# Patient Record
Sex: MALE | Race: WHITE | NOT HISPANIC OR LATINO | Employment: FULL TIME | URBAN - NONMETROPOLITAN AREA
[De-identification: names, ages, dates, MRNs, and addresses within clinical notes are randomized per-mention and may not be internally consistent; named-entity substitution may affect disease eponyms.]

---

## 2018-03-14 ENCOUNTER — APPOINTMENT (INPATIENT)
Dept: RADIOLOGY | Facility: HOSPITAL | Age: 58
DRG: 639 | End: 2018-03-14
Payer: COMMERCIAL

## 2018-03-14 ENCOUNTER — HOSPITAL ENCOUNTER (INPATIENT)
Facility: HOSPITAL | Age: 58
LOS: 1 days | Discharge: HOME | DRG: 639 | End: 2018-03-15
Attending: EMERGENCY MEDICINE | Admitting: INTERNAL MEDICINE
Payer: COMMERCIAL

## 2018-03-14 DIAGNOSIS — E11.10 DIABETIC KETOACIDOSIS WITHOUT COMA ASSOCIATED WITH TYPE 2 DIABETES MELLITUS (HCC): ICD-10-CM

## 2018-03-14 DIAGNOSIS — E11.65 TYPE 2 DIABETES MELLITUS WITH HYPERGLYCEMIA, WITHOUT LONG-TERM CURRENT USE OF INSULIN (HCC): ICD-10-CM

## 2018-03-14 DIAGNOSIS — R63.4 WEIGHT LOSS: ICD-10-CM

## 2018-03-14 DIAGNOSIS — E11.10 DKA (DIABETIC KETOACIDOSES): Primary | ICD-10-CM

## 2018-03-14 PROBLEM — E87.1 HYPONATREMIA: Status: ACTIVE | Noted: 2018-03-14

## 2018-03-14 LAB
ACETONE SERPL-MCNC: ABNORMAL MG/DL
ALBUMIN SERPL BCP-MCNC: 3.7 G/DL (ref 3.5–5)
ALP SERPL-CCNC: 79 U/L (ref 46–116)
ALT SERPL W P-5'-P-CCNC: 27 U/L (ref 12–78)
ANION GAP SERPL CALCULATED.3IONS-SCNC: 15 MMOL/L (ref 4–13)
ANION GAP SERPL CALCULATED.3IONS-SCNC: 18 MMOL/L (ref 4–13)
AST SERPL W P-5'-P-CCNC: 12 U/L (ref 5–45)
BACTERIA UR QL AUTO: NORMAL /HPF
BASOPHILS # BLD AUTO: 0.06 THOUSANDS/ΜL (ref 0–0.1)
BASOPHILS NFR BLD AUTO: 1 % (ref 0–1)
BILIRUB SERPL-MCNC: 0.5 MG/DL (ref 0.2–1)
BILIRUB UR QL STRIP: NEGATIVE
BUN SERPL-MCNC: 28 MG/DL (ref 5–25)
BUN SERPL-MCNC: 33 MG/DL (ref 5–25)
CALCIUM SERPL-MCNC: 8.6 MG/DL (ref 8.3–10.1)
CALCIUM SERPL-MCNC: 9.4 MG/DL (ref 8.3–10.1)
CHLORIDE SERPL-SCNC: 101 MMOL/L (ref 100–108)
CHLORIDE SERPL-SCNC: 97 MMOL/L (ref 100–108)
CLARITY UR: CLEAR
CO2 SERPL-SCNC: 18 MMOL/L (ref 21–32)
CO2 SERPL-SCNC: 23 MMOL/L (ref 21–32)
COLOR UR: YELLOW
CREAT SERPL-MCNC: 0.86 MG/DL (ref 0.6–1.3)
CREAT SERPL-MCNC: 1.12 MG/DL (ref 0.6–1.3)
EOSINOPHIL # BLD AUTO: 0.2 THOUSAND/ΜL (ref 0–0.61)
EOSINOPHIL NFR BLD AUTO: 2 % (ref 0–6)
ERYTHROCYTE [DISTWIDTH] IN BLOOD BY AUTOMATED COUNT: 13.5 % (ref 11.6–15.1)
GFR SERPL CREATININE-BSD FRML MDRD: 73 ML/MIN/1.73SQ M
GFR SERPL CREATININE-BSD FRML MDRD: 97 ML/MIN/1.73SQ M
GLUCOSE SERPL-MCNC: 133 MG/DL (ref 65–140)
GLUCOSE SERPL-MCNC: 177 MG/DL (ref 65–140)
GLUCOSE SERPL-MCNC: 186 MG/DL (ref 65–140)
GLUCOSE SERPL-MCNC: 192 MG/DL (ref 65–140)
GLUCOSE SERPL-MCNC: 321 MG/DL (ref 65–140)
GLUCOSE SERPL-MCNC: 327 MG/DL (ref 65–140)
GLUCOSE UR STRIP-MCNC: ABNORMAL MG/DL
HCT VFR BLD AUTO: 44.8 % (ref 36.5–49.3)
HGB BLD-MCNC: 16.1 G/DL (ref 12–17)
HGB UR QL STRIP.AUTO: ABNORMAL
KETONES UR STRIP-MCNC: ABNORMAL MG/DL
LACTATE SERPL-SCNC: 0.6 MMOL/L (ref 0.5–2)
LEUKOCYTE ESTERASE UR QL STRIP: ABNORMAL
LYMPHOCYTES # BLD AUTO: 2.55 THOUSANDS/ΜL (ref 0.6–4.47)
LYMPHOCYTES NFR BLD AUTO: 30 % (ref 14–44)
MAGNESIUM SERPL-MCNC: 2.3 MG/DL (ref 1.6–2.6)
MCH RBC QN AUTO: 28.7 PG (ref 26.8–34.3)
MCHC RBC AUTO-ENTMCNC: 35.9 G/DL (ref 31.4–37.4)
MCV RBC AUTO: 80 FL (ref 82–98)
MONOCYTES # BLD AUTO: 0.76 THOUSAND/ΜL (ref 0.17–1.22)
MONOCYTES NFR BLD AUTO: 9 % (ref 4–12)
NEUTROPHILS # BLD AUTO: 5 THOUSANDS/ΜL (ref 1.85–7.62)
NEUTS SEG NFR BLD AUTO: 58 % (ref 43–75)
NITRITE UR QL STRIP: NEGATIVE
NON-SQ EPI CELLS URNS QL MICRO: NORMAL /HPF
PH BLDV: 7.27 [PH] (ref 7.3–7.4)
PH UR STRIP.AUTO: 5.5 [PH] (ref 4.5–8)
PLATELET # BLD AUTO: 199 THOUSANDS/UL (ref 149–390)
PMV BLD AUTO: 11.1 FL (ref 8.9–12.7)
POTASSIUM SERPL-SCNC: 3.4 MMOL/L (ref 3.5–5.3)
POTASSIUM SERPL-SCNC: 4.2 MMOL/L (ref 3.5–5.3)
PROT SERPL-MCNC: 7.6 G/DL (ref 6.4–8.2)
PROT UR STRIP-MCNC: NEGATIVE MG/DL
RBC # BLD AUTO: 5.61 MILLION/UL (ref 3.88–5.62)
RBC #/AREA URNS AUTO: NORMAL /HPF
SODIUM SERPL-SCNC: 135 MMOL/L (ref 136–145)
SODIUM SERPL-SCNC: 137 MMOL/L (ref 136–145)
SP GR UR STRIP.AUTO: 1.01 (ref 1–1.03)
TROPONIN I SERPL-MCNC: <0.02 NG/ML
TROPONIN I SERPL-MCNC: <0.02 NG/ML
UROBILINOGEN UR QL STRIP.AUTO: 0.2 E.U./DL
WBC # BLD AUTO: 8.57 THOUSAND/UL (ref 4.31–10.16)
WBC #/AREA URNS AUTO: NORMAL /HPF

## 2018-03-14 PROCEDURE — 83605 ASSAY OF LACTIC ACID: CPT | Performed by: INTERNAL MEDICINE

## 2018-03-14 PROCEDURE — 71045 X-RAY EXAM CHEST 1 VIEW: CPT

## 2018-03-14 PROCEDURE — 99285 EMERGENCY DEPT VISIT HI MDM: CPT

## 2018-03-14 PROCEDURE — 82009 KETONE BODYS QUAL: CPT | Performed by: EMERGENCY MEDICINE

## 2018-03-14 PROCEDURE — 82800 BLOOD PH: CPT | Performed by: EMERGENCY MEDICINE

## 2018-03-14 PROCEDURE — 80053 COMPREHEN METABOLIC PANEL: CPT | Performed by: EMERGENCY MEDICINE

## 2018-03-14 PROCEDURE — 85025 COMPLETE CBC W/AUTO DIFF WBC: CPT | Performed by: EMERGENCY MEDICINE

## 2018-03-14 PROCEDURE — 96360 HYDRATION IV INFUSION INIT: CPT

## 2018-03-14 PROCEDURE — 93005 ELECTROCARDIOGRAM TRACING: CPT | Performed by: EMERGENCY MEDICINE

## 2018-03-14 PROCEDURE — 84484 ASSAY OF TROPONIN QUANT: CPT | Performed by: INTERNAL MEDICINE

## 2018-03-14 PROCEDURE — 82948 REAGENT STRIP/BLOOD GLUCOSE: CPT

## 2018-03-14 PROCEDURE — 83036 HEMOGLOBIN GLYCOSYLATED A1C: CPT | Performed by: INTERNAL MEDICINE

## 2018-03-14 PROCEDURE — 84156 ASSAY OF PROTEIN URINE: CPT | Performed by: INTERNAL MEDICINE

## 2018-03-14 PROCEDURE — 84484 ASSAY OF TROPONIN QUANT: CPT | Performed by: EMERGENCY MEDICINE

## 2018-03-14 PROCEDURE — 36415 COLL VENOUS BLD VENIPUNCTURE: CPT | Performed by: EMERGENCY MEDICINE

## 2018-03-14 PROCEDURE — 80048 BASIC METABOLIC PNL TOTAL CA: CPT | Performed by: INTERNAL MEDICINE

## 2018-03-14 PROCEDURE — 99222 1ST HOSP IP/OBS MODERATE 55: CPT | Performed by: INTERNAL MEDICINE

## 2018-03-14 PROCEDURE — 82043 UR ALBUMIN QUANTITATIVE: CPT | Performed by: INTERNAL MEDICINE

## 2018-03-14 PROCEDURE — 81001 URINALYSIS AUTO W/SCOPE: CPT | Performed by: EMERGENCY MEDICINE

## 2018-03-14 PROCEDURE — 83735 ASSAY OF MAGNESIUM: CPT | Performed by: EMERGENCY MEDICINE

## 2018-03-14 PROCEDURE — 87086 URINE CULTURE/COLONY COUNT: CPT | Performed by: INTERNAL MEDICINE

## 2018-03-14 PROCEDURE — 82570 ASSAY OF URINE CREATININE: CPT | Performed by: INTERNAL MEDICINE

## 2018-03-14 RX ORDER — ACETAMINOPHEN 325 MG/1
650 TABLET ORAL EVERY 6 HOURS PRN
Status: DISCONTINUED | OUTPATIENT
Start: 2018-03-14 | End: 2018-03-15 | Stop reason: HOSPADM

## 2018-03-14 RX ORDER — SODIUM CHLORIDE 9 MG/ML
150 INJECTION, SOLUTION INTRAVENOUS CONTINUOUS
Status: DISCONTINUED | OUTPATIENT
Start: 2018-03-14 | End: 2018-03-15

## 2018-03-14 RX ORDER — SODIUM CHLORIDE 9 MG/ML
1000 INJECTION, SOLUTION INTRAVENOUS CONTINUOUS
Status: DISCONTINUED | OUTPATIENT
Start: 2018-03-14 | End: 2018-03-14

## 2018-03-14 RX ORDER — SODIUM CHLORIDE 9 MG/ML
500 INJECTION, SOLUTION INTRAVENOUS CONTINUOUS
Status: DISCONTINUED | OUTPATIENT
Start: 2018-03-14 | End: 2018-03-14

## 2018-03-14 RX ORDER — SODIUM CHLORIDE 9 MG/ML
125 INJECTION, SOLUTION INTRAVENOUS CONTINUOUS
Status: DISCONTINUED | OUTPATIENT
Start: 2018-03-15 | End: 2018-03-14

## 2018-03-14 RX ORDER — ONDANSETRON 2 MG/ML
4 INJECTION INTRAMUSCULAR; INTRAVENOUS EVERY 6 HOURS PRN
Status: DISCONTINUED | OUTPATIENT
Start: 2018-03-14 | End: 2018-03-15 | Stop reason: HOSPADM

## 2018-03-14 RX ORDER — POTASSIUM CHLORIDE 20 MEQ/1
40 TABLET, EXTENDED RELEASE ORAL ONCE
Status: COMPLETED | OUTPATIENT
Start: 2018-03-14 | End: 2018-03-14

## 2018-03-14 RX ORDER — DEXTROSE AND SODIUM CHLORIDE 5; .9 G/100ML; G/100ML
150 INJECTION, SOLUTION INTRAVENOUS CONTINUOUS
Status: DISCONTINUED | OUTPATIENT
Start: 2018-03-14 | End: 2018-03-15

## 2018-03-14 RX ADMIN — DEXTROSE AND SODIUM CHLORIDE 150 ML/HR: 5; .9 INJECTION, SOLUTION INTRAVENOUS at 21:50

## 2018-03-14 RX ADMIN — SODIUM CHLORIDE 1000 ML: 0.9 INJECTION, SOLUTION INTRAVENOUS at 21:14

## 2018-03-14 RX ADMIN — SODIUM CHLORIDE 1000 ML: 0.9 INJECTION, SOLUTION INTRAVENOUS at 23:10

## 2018-03-14 RX ADMIN — SODIUM CHLORIDE 1000 ML: 0.9 INJECTION, SOLUTION INTRAVENOUS at 17:51

## 2018-03-14 RX ADMIN — POTASSIUM CHLORIDE 40 MEQ: 1500 TABLET, EXTENDED RELEASE ORAL at 23:08

## 2018-03-14 RX ADMIN — Medication 2 UNITS/HR: at 21:39

## 2018-03-14 RX ADMIN — ENOXAPARIN SODIUM 40 MG: 40 INJECTION SUBCUTANEOUS at 22:56

## 2018-03-14 NOTE — ED PROVIDER NOTES
History  Chief Complaint   Patient presents with    Hyperglycemia - no symptoms     blood sugars running in the 400's for 4 days  feet cramping, dry mouth, tongue cracking     Patient: Manjeet Suarez  57 y o /male  YOB: 1960  MRN: [id number]  PCP: Maria Alejandra Howard DO  Date of evaluation: 3/14/2018    (N B  [address] may have been used in the preparation of this document )    History provided by:  Patient and spouse  Hyperglycemia - no symptoms   Timing:  Constant  Progression:  Worsening  Current diabetic treatments: diabetic - meds only sporadically because lost job, insurance  Relieved by:  Nothing  Ineffective treatments: increased po fluids  Associated symptoms: blurred vision, dehydration, increased thirst, polyuria and weight change (est 48 pounds in several months)    Associated symptoms: no abdominal pain, no altered mental status, no chest pain, no confusion, no diaphoresis, no dysuria, no fever, no increased appetite, no shortness of breath, no vomiting and no weakness  Nausea: from drinking so much at a time  Prior to Admission Medications   Prescriptions Last Dose Informant Patient Reported? Taking? Empagliflozin (JARDIANCE) 25 MG TABS   Yes Yes   Sig: Take 25 mg by mouth every morning   metFORMIN (GLUCOPHAGE) 500 mg tablet   Yes Yes   Sig: Take 500 mg by mouth daily after dinner      Facility-Administered Medications: None       Past Medical History:   Diagnosis Date    [facility]        Past Surgical History:   Procedure Laterality Date    HERNIA REPAIR         History reviewed  No pertinent family history  I have reviewed and agree with the history as documented  Social History   Substance Use Topics    Smoking status: Never Smoker    Smokeless tobacco: Current User    Alcohol use Yes      Comment: ocassional        Review of Systems   Constitutional: Negative for chills, diaphoresis and fever  HENT: Negative    Negative for trouble swallowing and voice change  Mouth dry   Eyes: Positive for blurred vision  Negative for photophobia  Visual disturbance: blurry vision  Respiratory: Negative  Negative for cough and shortness of breath  Cardiovascular: Negative  Negative for chest pain and palpitations  Gastrointestinal: Negative  Negative for abdominal pain and vomiting  Nausea: from drinking so much at a time  Endocrine: Positive for polydipsia and polyuria  Genitourinary: Negative  Negative for difficulty urinating, dysuria and urgency  Musculoskeletal: Negative  Negative for back pain and neck pain  Skin: Negative  Negative for rash and wound  Allergic/Immunologic: Negative for immunocompromised state  Neurological: Negative  Negative for speech difficulty and weakness  Hematological: Negative  Negative for adenopathy  Does not bruise/bleed easily  Psychiatric/Behavioral: Negative for confusion  All other systems reviewed and are negative  Physical Exam  ED Triage Vitals [03/14/18 1621]   Temperature Pulse Respirations Blood Pressure SpO2   (!) 96 8 °F (36 °C) 90 20 143/84 98 %      Temp Source Heart Rate Source Patient Position - Orthostatic VS BP Location FiO2 (%)   Temporal Right Sitting Right arm --      Pain Score       No Pain           Orthostatic Vital Signs  Vitals:    03/14/18 1621   BP: 143/84   Pulse: 90   Patient Position - Orthostatic VS: Sitting       Physical Exam   Constitutional: He is oriented to person, place, and time  He appears well-developed and well-nourished  HENT:   Mouth/Throat: Oropharynx is clear and moist  Mucous membranes are not pale and dry  Voice normal   Eyes: EOM are normal  Pupils are equal, round, and reactive to light  Cardiovascular: Normal rate and regular rhythm  Pulmonary/Chest: Effort normal    Abdominal: Soft  Bowel sounds are normal    Neurological: He is alert and oriented to person, place, and time  GCS eye subscore is 4   GCS verbal subscore is 5  GCS motor subscore is 6  Skin: Skin is warm and dry  Psychiatric: He has a normal mood and affect  His speech is normal and behavior is normal    Nursing note and vitals reviewed  ED Medications  Medications   sodium chloride 0 9 % bolus 1,000 mL (not administered)       Diagnostic Studies  Results Reviewed     Procedure Component Value Units Date/Time    pH, venous [[id number]] Collected:  03/14/18 1732    Lab Status: In process Specimen:  Blood from Arm, Left Updated:  03/14/18 1739    CBC and differential [[id number]]  (Abnormal) Collected:  03/14/18 1732    Lab Status:  Final result Specimen:  Blood from Arm, Left Updated:  03/14/18 1738     WBC 8 57 Thousand/uL      RBC 5 61 Million/uL      Hemoglobin 16 1 g/dL      Hematocrit 44 8 %      MCV 80 (L) fL      MCH 28 7 pg      MCHC 35 9 g/dL      RDW 13 5 %      MPV 11 1 fL      Platelets 465 Thousands/uL      Neutrophils Relative 58 %      Lymphocytes Relative 30 %      Monocytes Relative 9 %      Eosinophils Relative 2 %      Basophils Relative 1 %      Neutrophils Absolute 5 00 Thousands/µL      Lymphocytes Absolute 2 55 Thousands/µL      Monocytes Absolute 0 76 Thousand/µL      Eosinophils Absolute 0 20 Thousand/µL      Basophils Absolute 0 06 Thousands/µL     Acetone [[id number]]     Lab Status:  No result Specimen:  Blood     Magnesium [[id number]]     Lab Status:  No result Specimen:  Blood     Troponin I [[id number]]     Lab Status:  No result Specimen:  Blood     UA w Reflex to Microscopic [[id number]]     Lab Status:  No result Specimen:  Urine     Comprehensive metabolic panel [[id number]] Collected:  03/14/18 1732    Lab Status: In process Specimen:  Blood from Arm, Left Updated:  03/14/18 1736    [place] draw [[id number]] Collected:  03/14/18 1732    Lab Status: In process Specimen:  Blood Updated:  03/14/18 1736    Narrative: The following orders were created for panel order [place] draw    Procedure Abnormality         Status                     ---------                               -----------         ------                     Saray Joe Top on FGQU[[id number]]                            In process                 Gold top on MRFH[[id number]]                                  In process                 Green / Black tube on HHIL[[id number]]                        In process                 Lavender Top 7ml on YDUI[[id number]]                          In process                   Please view results for these tests on the individual orders  Fingerstick Glucose (POCT) [[id number]]  (Abnormal) Collected:  03/14/18 1627    Lab Status:  Final result Updated:  03/14/18 1629     POC Glucose 321 (H) mg/dl                  No orders to display              Procedures  Procedures       Phone Contacts  ED Phone Contact    ED Course  ED Course as of Mar 21 2109   Wed Mar 14, 2018   1933 D/W Dr Shirley Thompson - he is ordering the insulin drip  1933 pH, Keagan: (!) 7 271   1934 Ketones, UA: (!) 40 (2+)   1934 Acetone, Bld: (!) 1+   1934 Anion Gap: (!) 15                               MDM  CritCare Time    Disposition  Final diagnoses:   None     ED Disposition     None      Follow-up Information    None       Patient's Medications   Discharge Prescriptions    No medications on file     No discharge procedures on file      ED Provider  Electronically Signed by           Ricki Barboza MD  03/21/18 2109

## 2018-03-15 VITALS
HEART RATE: 66 BPM | SYSTOLIC BLOOD PRESSURE: 110 MMHG | RESPIRATION RATE: 19 BRPM | TEMPERATURE: 97.4 F | BODY MASS INDEX: 31.03 KG/M2 | WEIGHT: 249.56 LBS | DIASTOLIC BLOOD PRESSURE: 72 MMHG | HEIGHT: 75 IN | OXYGEN SATURATION: 97 %

## 2018-03-15 PROBLEM — E11.10 DIABETIC KETOACIDOSIS (HCC): Status: RESOLVED | Noted: 2018-03-14 | Resolved: 2018-03-15

## 2018-03-15 PROBLEM — E87.1 HYPONATREMIA: Status: RESOLVED | Noted: 2018-03-14 | Resolved: 2018-03-15

## 2018-03-15 LAB
25(OH)D3 SERPL-MCNC: 17.9 NG/ML (ref 30–100)
ALBUMIN SERPL BCP-MCNC: 2.8 G/DL (ref 3.5–5)
ALBUMIN SERPL BCP-MCNC: 2.9 G/DL (ref 3.5–5)
ALP SERPL-CCNC: 53 U/L (ref 46–116)
ALP SERPL-CCNC: 53 U/L (ref 46–116)
ALT SERPL W P-5'-P-CCNC: 18 U/L (ref 12–78)
ALT SERPL W P-5'-P-CCNC: 21 U/L (ref 12–78)
ANION GAP SERPL CALCULATED.3IONS-SCNC: 11 MMOL/L (ref 4–13)
ANION GAP SERPL CALCULATED.3IONS-SCNC: 11 MMOL/L (ref 4–13)
ANION GAP SERPL CALCULATED.3IONS-SCNC: 15 MMOL/L (ref 4–13)
ANION GAP SERPL CALCULATED.3IONS-SCNC: 16 MMOL/L (ref 4–13)
AST SERPL W P-5'-P-CCNC: 11 U/L (ref 5–45)
AST SERPL W P-5'-P-CCNC: 9 U/L (ref 5–45)
ATRIAL RATE: 89 BPM
BASOPHILS # BLD AUTO: 0.05 THOUSANDS/ΜL (ref 0–0.1)
BASOPHILS NFR BLD AUTO: 1 % (ref 0–1)
BILIRUB DIRECT SERPL-MCNC: 0.16 MG/DL (ref 0–0.2)
BILIRUB SERPL-MCNC: 0.5 MG/DL (ref 0.2–1)
BILIRUB SERPL-MCNC: 0.5 MG/DL (ref 0.2–1)
BUN SERPL-MCNC: 17 MG/DL (ref 5–25)
BUN SERPL-MCNC: 17 MG/DL (ref 5–25)
BUN SERPL-MCNC: 19 MG/DL (ref 5–25)
BUN SERPL-MCNC: 24 MG/DL (ref 5–25)
CA-I BLD-SCNC: 1.15 MMOL/L (ref 1.12–1.32)
CALCIUM SERPL-MCNC: 7.9 MG/DL (ref 8.3–10.1)
CALCIUM SERPL-MCNC: 8 MG/DL (ref 8.3–10.1)
CALCIUM SERPL-MCNC: 8 MG/DL (ref 8.3–10.1)
CALCIUM SERPL-MCNC: 8.1 MG/DL (ref 8.3–10.1)
CHLORIDE SERPL-SCNC: 106 MMOL/L (ref 100–108)
CK SERPL-CCNC: 33 U/L (ref 39–308)
CO2 SERPL-SCNC: 19 MMOL/L (ref 21–32)
CO2 SERPL-SCNC: 20 MMOL/L (ref 21–32)
CO2 SERPL-SCNC: 22 MMOL/L (ref 21–32)
CO2 SERPL-SCNC: 22 MMOL/L (ref 21–32)
CREAT SERPL-MCNC: 0.75 MG/DL (ref 0.6–1.3)
CREAT SERPL-MCNC: 0.78 MG/DL (ref 0.6–1.3)
CREAT SERPL-MCNC: 0.78 MG/DL (ref 0.6–1.3)
CREAT SERPL-MCNC: 0.82 MG/DL (ref 0.6–1.3)
CREAT UR-MCNC: 51 MG/DL
CREAT UR-MCNC: 51 MG/DL
EOSINOPHIL # BLD AUTO: 0.17 THOUSAND/ΜL (ref 0–0.61)
EOSINOPHIL NFR BLD AUTO: 3 % (ref 0–6)
ERYTHROCYTE [DISTWIDTH] IN BLOOD BY AUTOMATED COUNT: 13.5 % (ref 11.6–15.1)
EST. AVERAGE GLUCOSE BLD GHB EST-MCNC: 312 MG/DL
GFR SERPL CREATININE-BSD FRML MDRD: 101 ML/MIN/1.73SQ M
GFR SERPL CREATININE-BSD FRML MDRD: 101 ML/MIN/1.73SQ M
GFR SERPL CREATININE-BSD FRML MDRD: 103 ML/MIN/1.73SQ M
GFR SERPL CREATININE-BSD FRML MDRD: 99 ML/MIN/1.73SQ M
GLUCOSE SERPL-MCNC: 112 MG/DL (ref 65–140)
GLUCOSE SERPL-MCNC: 115 MG/DL (ref 65–140)
GLUCOSE SERPL-MCNC: 115 MG/DL (ref 65–140)
GLUCOSE SERPL-MCNC: 123 MG/DL (ref 65–140)
GLUCOSE SERPL-MCNC: 125 MG/DL (ref 65–140)
GLUCOSE SERPL-MCNC: 127 MG/DL (ref 65–140)
GLUCOSE SERPL-MCNC: 138 MG/DL (ref 65–140)
GLUCOSE SERPL-MCNC: 139 MG/DL (ref 65–140)
GLUCOSE SERPL-MCNC: 146 MG/DL (ref 65–140)
GLUCOSE SERPL-MCNC: 148 MG/DL (ref 65–140)
GLUCOSE SERPL-MCNC: 149 MG/DL (ref 65–140)
GLUCOSE SERPL-MCNC: 159 MG/DL (ref 65–140)
GLUCOSE SERPL-MCNC: 162 MG/DL (ref 65–140)
GLUCOSE SERPL-MCNC: 165 MG/DL (ref 65–140)
GLUCOSE SERPL-MCNC: 171 MG/DL (ref 65–140)
HBA1C MFR BLD: 12.5 % (ref 4.2–6.3)
HCT VFR BLD AUTO: 37.5 % (ref 36.5–49.3)
HGB BLD-MCNC: 13.4 G/DL (ref 12–17)
LACTATE SERPL-SCNC: 0.8 MMOL/L (ref 0.5–2)
LYMPHOCYTES # BLD AUTO: 2.28 THOUSANDS/ΜL (ref 0.6–4.47)
LYMPHOCYTES NFR BLD AUTO: 42 % (ref 14–44)
MAGNESIUM SERPL-MCNC: 2 MG/DL (ref 1.6–2.6)
MAGNESIUM SERPL-MCNC: 2 MG/DL (ref 1.6–2.6)
MAGNESIUM SERPL-MCNC: 2.1 MG/DL (ref 1.6–2.6)
MCH RBC QN AUTO: 29.1 PG (ref 26.8–34.3)
MCHC RBC AUTO-ENTMCNC: 35.7 G/DL (ref 31.4–37.4)
MCV RBC AUTO: 81 FL (ref 82–98)
MICROALBUMIN UR-MCNC: 16.7 MG/L (ref 0–20)
MICROALBUMIN/CREAT 24H UR: 33 MG/G CREATININE (ref 0–30)
MONOCYTES # BLD AUTO: 0.44 THOUSAND/ΜL (ref 0.17–1.22)
MONOCYTES NFR BLD AUTO: 8 % (ref 4–12)
NEUTROPHILS # BLD AUTO: 2.55 THOUSANDS/ΜL (ref 1.85–7.62)
NEUTS SEG NFR BLD AUTO: 46 % (ref 43–75)
P AXIS: 68 DEGREES
PHOSPHATE SERPL-MCNC: 3.6 MG/DL (ref 2.7–4.5)
PLATELET # BLD AUTO: 162 THOUSANDS/UL (ref 149–390)
PMV BLD AUTO: 11.2 FL (ref 8.9–12.7)
POTASSIUM SERPL-SCNC: 3.4 MMOL/L (ref 3.5–5.3)
POTASSIUM SERPL-SCNC: 3.9 MMOL/L (ref 3.5–5.3)
POTASSIUM SERPL-SCNC: 4 MMOL/L (ref 3.5–5.3)
POTASSIUM SERPL-SCNC: 4 MMOL/L (ref 3.5–5.3)
PR INTERVAL: 130 MS
PROT SERPL-MCNC: 6 G/DL (ref 6.4–8.2)
PROT SERPL-MCNC: 6.1 G/DL (ref 6.4–8.2)
PROT UR-MCNC: 19 MG/DL
PROT/CREAT UR: 0.37 MG/G{CREAT} (ref 0–0.1)
QRS AXIS: -39 DEGREES
QRSD INTERVAL: 84 MS
QT INTERVAL: 378 MS
QTC INTERVAL: 459 MS
RBC # BLD AUTO: 4.61 MILLION/UL (ref 3.88–5.62)
SODIUM SERPL-SCNC: 139 MMOL/L (ref 136–145)
SODIUM SERPL-SCNC: 139 MMOL/L (ref 136–145)
SODIUM SERPL-SCNC: 140 MMOL/L (ref 136–145)
SODIUM SERPL-SCNC: 142 MMOL/L (ref 136–145)
T WAVE AXIS: 33 DEGREES
TROPONIN I SERPL-MCNC: <0.02 NG/ML
TSH SERPL DL<=0.05 MIU/L-ACNC: 3.54 UIU/ML (ref 0.36–3.74)
VENTRICULAR RATE: 89 BPM
WBC # BLD AUTO: 5.49 THOUSAND/UL (ref 4.31–10.16)

## 2018-03-15 PROCEDURE — G8979 MOBILITY GOAL STATUS: HCPCS | Performed by: PHYSICAL THERAPIST

## 2018-03-15 PROCEDURE — 80053 COMPREHEN METABOLIC PANEL: CPT | Performed by: INTERNAL MEDICINE

## 2018-03-15 PROCEDURE — G8978 MOBILITY CURRENT STATUS: HCPCS | Performed by: PHYSICAL THERAPIST

## 2018-03-15 PROCEDURE — 84484 ASSAY OF TROPONIN QUANT: CPT | Performed by: INTERNAL MEDICINE

## 2018-03-15 PROCEDURE — 83735 ASSAY OF MAGNESIUM: CPT | Performed by: INTERNAL MEDICINE

## 2018-03-15 PROCEDURE — 84443 ASSAY THYROID STIM HORMONE: CPT | Performed by: INTERNAL MEDICINE

## 2018-03-15 PROCEDURE — 93010 ELECTROCARDIOGRAM REPORT: CPT | Performed by: INTERNAL MEDICINE

## 2018-03-15 PROCEDURE — 90686 IIV4 VACC NO PRSV 0.5 ML IM: CPT | Performed by: INTERNAL MEDICINE

## 2018-03-15 PROCEDURE — 80048 BASIC METABOLIC PNL TOTAL CA: CPT | Performed by: INTERNAL MEDICINE

## 2018-03-15 PROCEDURE — 90732 PPSV23 VACC 2 YRS+ SUBQ/IM: CPT | Performed by: INTERNAL MEDICINE

## 2018-03-15 PROCEDURE — 82330 ASSAY OF CALCIUM: CPT | Performed by: INTERNAL MEDICINE

## 2018-03-15 PROCEDURE — G8988 SELF CARE GOAL STATUS: HCPCS

## 2018-03-15 PROCEDURE — G8980 MOBILITY D/C STATUS: HCPCS | Performed by: PHYSICAL THERAPIST

## 2018-03-15 PROCEDURE — 82948 REAGENT STRIP/BLOOD GLUCOSE: CPT

## 2018-03-15 PROCEDURE — 83605 ASSAY OF LACTIC ACID: CPT | Performed by: INTERNAL MEDICINE

## 2018-03-15 PROCEDURE — 84100 ASSAY OF PHOSPHORUS: CPT | Performed by: INTERNAL MEDICINE

## 2018-03-15 PROCEDURE — 97167 OT EVAL HIGH COMPLEX 60 MIN: CPT

## 2018-03-15 PROCEDURE — 82550 ASSAY OF CK (CPK): CPT | Performed by: INTERNAL MEDICINE

## 2018-03-15 PROCEDURE — 97162 PT EVAL MOD COMPLEX 30 MIN: CPT | Performed by: PHYSICAL THERAPIST

## 2018-03-15 PROCEDURE — G8989 SELF CARE D/C STATUS: HCPCS

## 2018-03-15 PROCEDURE — 99239 HOSP IP/OBS DSCHRG MGMT >30: CPT | Performed by: INTERNAL MEDICINE

## 2018-03-15 PROCEDURE — 85025 COMPLETE CBC W/AUTO DIFF WBC: CPT | Performed by: INTERNAL MEDICINE

## 2018-03-15 PROCEDURE — 82306 VITAMIN D 25 HYDROXY: CPT | Performed by: INTERNAL MEDICINE

## 2018-03-15 PROCEDURE — G8987 SELF CARE CURRENT STATUS: HCPCS

## 2018-03-15 PROCEDURE — 80076 HEPATIC FUNCTION PANEL: CPT | Performed by: INTERNAL MEDICINE

## 2018-03-15 RX ORDER — INSULIN GLARGINE 100 [IU]/ML
24 INJECTION, SOLUTION SUBCUTANEOUS
Qty: 10 ML | Refills: 0 | Status: SHIPPED | OUTPATIENT
Start: 2018-03-15 | End: 2019-04-30

## 2018-03-15 RX ORDER — INSULIN GLARGINE 100 [IU]/ML
24 INJECTION, SOLUTION SUBCUTANEOUS
Status: DISCONTINUED | OUTPATIENT
Start: 2018-03-15 | End: 2018-03-15 | Stop reason: HOSPADM

## 2018-03-15 RX ORDER — POTASSIUM CHLORIDE 20 MEQ/1
40 TABLET, EXTENDED RELEASE ORAL ONCE
Status: COMPLETED | OUTPATIENT
Start: 2018-03-15 | End: 2018-03-15

## 2018-03-15 RX ADMIN — PNEUMOCOCCAL VACCINE POLYVALENT 0.5 ML
25; 25; 25; 25; 25; 25; 25; 25; 25; 25; 25; 25; 25; 25; 25; 25; 25; 25; 25; 25; 25; 25; 25 INJECTION, SOLUTION INTRAMUSCULAR; SUBCUTANEOUS at 12:24

## 2018-03-15 RX ADMIN — POTASSIUM CHLORIDE 40 MEQ: 1500 TABLET, EXTENDED RELEASE ORAL at 03:08

## 2018-03-15 RX ADMIN — INFLUENZA VIRUS VACCINE 0.5 ML: 15; 15; 15; 15 SUSPENSION INTRAMUSCULAR at 12:25

## 2018-03-15 RX ADMIN — INSULIN LISPRO 5 UNITS: 100 INJECTION, SOLUTION INTRAVENOUS; SUBCUTANEOUS at 12:23

## 2018-03-15 RX ADMIN — INSULIN HUMAN 12 UNITS: 100 INJECTION, SUSPENSION SUBCUTANEOUS at 10:34

## 2018-03-15 RX ADMIN — DEXTROSE AND SODIUM CHLORIDE 150 ML/HR: 5; .9 INJECTION, SOLUTION INTRAVENOUS at 04:08

## 2018-03-15 NOTE — SOCIAL WORK
Received call from MD he had Rx faxed to [facility] in [facility] and he wanted me to run the cost of them  As per pharmacist the lantus in vial form will cost the pt $50  He could get generic form of Lantus in a pen for a little cheaper approx $40   Did notify pt's nurse of same  Pt is currently talking to dietician I will let him know after she is done

## 2018-03-15 NOTE — OCCUPATIONAL THERAPY NOTE
Occupational Therapy Evaluation     Patient Name: Lydia Hardin  NLZKK'D Date: 3/15/2018  Problem List  Patient Active Problem List   Diagnosis    Di[facility]    Type 2 diabetes mellitus with hyperglycemia ([facility] )    Hyponatremia    Weight loss     Past Medical History  Past Medical History:   Diagnosis Date    Diabetes mellitus ([facility] )      Past Surgical History  Past Surgical History:   Procedure Laterality Date    HERNIA REPAIR             03/15/18 0746   Note Type   Note type Eval only   Restrictions/Precautions   Weight Bearing Precautions Per Order No   Other Precautions Multiple lines;Telemetry; Fall Risk   Pain Assessment   Pain Assessment No/denies pain   Home Living   Type of [address] [place] [address] One level;Performs ADLs on one level; Able to live on main level with bedroom/bathroom;Stairs to enter with rails  (3 MELINDA c HR)   Bathroom Shower/Tub Tub/shower unit   Bathroom Toilet Standard   Bathroom Accessibility Accessible   Additional Comments pt with no DME   Prior Function   Level of [place] Independent with ADLs and functional mobility   Lives With Spouse   ADL Assistance Independent   IADLs Independent   Falls in the last 6 months 0   Vocational Full time employment   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    Psychosocial   Psychosocial (WDL) WDL   Bed Mobility   Additional Comments pt OOB in chair at start and end of session   Transfers   Sit to Stand 7  Independent   Stand to Sit 7  Independent   Functional Mobility   Functional Mobility 7  Independent   Additional Comments pt performed 200ft of FM with no difficulties; no SOB and no LOB   Balance   Static Sitting Good   Dynamic Sitting Good   Static Standing Good   Dynamic Standing Good   Ambulatory Good   Activity Tolerance   Activity Tolerance Patient tolerated treatment well   RUE Assessment   RUE Assessment WFL   LUE Assessment   LUE Assessment WFL   Hand Function   Gross Motor Coordination Functional   Fine Motor Coordination Functional   Sensation   Light Touch No apparent deficits   Sharp/Dull No apparent deficits   Cognition   Overall Cognitive Status WFL   Arousal/Participation Alert   Attention Within functional limits   Orientation Level Oriented X4   Memory Within functional limits   Following Commands Follows all commands and directions without difficulty   Assessment   Assessment pt with no functional limitations requiring skilled OT intervention   Recommendation   OT Discharge Recommendation Home independent   OT - OK to Discharge Yes   Barthel Index   Feeding 10   Bathing 5   Grooming Score 5   Dressing Score 10   Bladder Score 10   Bowels Score 10   Toilet Use Score 10   Transfers (Bed/Chair) Score 15   Mobility (Level Surface) Score 15   Stairs Score 0   Barthel Index Score 90     Pt left seated in chair at end of session; all needs within reach; all lines intact  Pt is performing at [facility]; OT services will be discontinued at this time

## 2018-03-15 NOTE — PLAN OF CARE
Problem: PHYSICAL THERAPY ADULT  Goal: Performs mobility at highest level of function for planned discharge setting  See evaluation for individualized goals  Outcome: Completed Date Met: 03/15/18  Prognosis: Good     Assessment: Pt is a 65year old male presenting with good strength balance endurance and functional mobility performing all bed mobility transfers and ambulation at an (I) level no A D  Pt safe to ambulate in room and hallway ad jennie  No skilled PT indicated  Recommendation: Home independently     PT - OK to Discharge: Yes    See flowsheet documentation for full assessment

## 2018-03-15 NOTE — CASE MANAGEMENT
Initial Clinical Review  Admission: Date/Time/Statement: 3/14/18 @ 1935   Orders Placed This Encounter   Procedures    Inpatient Admission (expected length of stay for this patient is greater than two midnights)     Standing Status:   Standing     Number of Occurrences:   1     Order Specific Question:   Admitting Physician     Answer:   Marcelle Rodriguez     Order Specific Question:   Level of Care     Answer:   Level 1 Stepdown [13]     Order Specific Question:   Estimated length of stay     Answer:   More than 2 Midnights     Order Specific Question:   Certification     Answer:   I certify that inpatient services are medically necessary for this patient for a duration of greater than two midnights  See H&P and MD Progress Notes for additional information about the patient's course of treatment  ED: Date/Time/Mode of Arrival:   ED Arrival Information     Expected Arrival Acuity Means of Arrival Escorted By Service Admission Type    - 3/14/2018 16:03 Urgent Walk-In Family Member General Medicine Urgent    Arrival Complaint    high blood sugar      Chief Complaint:   Chief Complaint   Patient presents with    Hyperglycemia - no symptoms     blood sugars running in the 400's for 4 days  feet cramping, dry mouth, tongue cracking   History of Illness:    65year-old male who presented to the ED with the complaint of elevated blood glucose levels  The patient had previously lost his insurance coverage and was unable to obtain his oral diabetic medications secondary to cost   He has been off his oral diabetic medications for several months  He has been experiencing frequent hyperglycemia with blood glucose readings in the 400's and at times, the blood glucose level is critically high on the glucometer  The patient has been experiencing severe polydipsia and polyuria  In addition, he complains of a visual disturbance described as blurry vision  Nothing seemed to relieve his symptoms    In addition, the patient complains of a 40-50 pound weight loss over the last few months  No chest pain  No shortness of breath  He also has been experiencing bilateral foot pain over the last few weeks  He recently started a new job and now has insurance coverage  Additional associated symptoms include difficulty focusing and daytime fatigue  ED Vital Signs:   ED Triage Vitals [03/14/18 1621]   Temperature Pulse Respirations Blood Pressure SpO2   (!) 96 8 °F (36 °C) 90 20 143/84 98 %      Temp Source Heart Rate Source Patient Position - Orthostatic VS BP Location FiO2 (%)   Temporal Right Sitting Right arm --      Pain Score       No Pain        Wt Readings from Last 1 Encounters:   03/15/18 113 kg (249 lb 9 oz)   Vital Signs (abnormal): Abnormal Labs/Diagnostic Test Results:    CL 97 ANION GAP 15 BUN 33 GLUC 327 ACETONE 1+  pH VENOUS 7 271  U/A+LEUK, GLUC, KETONES 40 (2+), BLOOD  TROP NEG  Microalb Creat Ratio 0 - 30 mg/g creatinine 33   H     Prot/Creat Ratio, Ur 0 00 - 0 10 0 37   H   ED Treatment:   Medication Administration from 03/14/2018 1602 to 03/14/2018 2040       Date/Time Order Dose Route Action Action by Comments     03/14/2018 1851 sodium chloride 0 9 % bolus 1,000 mL 0 mL Intravenous Stopped Hussain Bacon RN      03/14/2018 1751 sodium chloride 0 9 % bolus 1,000 mL 1,000 mL Intravenous New Bag Hussain Bacon RN       Past Medical/Surgical History:    Active Ambulatory Problems     Diagnosis Date Noted    No Active Ambulatory Problems     Resolved Ambulatory Problems     Diagnosis Date Noted    No Resolved Ambulatory Problems     Past Medical History:   Diagnosis Date    Diabetes mellitus ([place] [address] )    Admitting Diagnosis: DKA (diabetic ketoacidoses) ([place] [address] ) [E13 10]  High blood sugar [R73 9]  Age/Sex: 65 y o  male  Assessment/Plan:   1)  Diabetic ketoacidosis/Type 2 diabetes mellitus with hyperglycemia and without long-term current insulin use:  Admit the patient to inpatient status, Level 1-Stepdown status  The patient will require at least a 2-midnight inpatient hospitalization for work-up and treatment of the symptomatology  Initiate an IV insulin drip/infusion per the DKA protocol  Monitor the patient's blood glucose levels every 1 hour  The patient will receive a total of 3000 ml of NSS IV fluids via initial boluses  He will then be continued on NSS IV fluids at 150 ml/hr  When the blood glucose decreases below 250 mg/dl, the IV fluids will be changed to D5 NSS IV fluids at 150 ml/hr  Check a BMP and a magnesium level every 4 hours  Check a HgbA1c level  He may need insulin therapy upon discharge  Check a urine microalbumin/creatinine level  The patient would benefit from a low-dose ACE-Inhibitor for renal protection depending on his blood pressure trend  The patient states that he does tend to run lower blood pressures  The patient should follow-up with his PCP in regards to the initiation of an ACE-Inhibitor for renal protection  I would recommend the patient following up with Endocrinology as an outpatient  He should also have regular Ophthalmology exams and Podiatry exams  2)  Hyponatremia: This is pseudohyponatremia secondary to hyperglycemia  In addition, he likely has a component of hypovolemic hyponatremia  Continue to monitor the patient's sodium level while his hyperglycemia is corrected  NSS IV fluids  Check a TSH  3)  Weight loss:  Check a chest xray  He will need outpatient follow-up with his PCP for age-appropriate cancer screening  He also should follow-up with Gastroenterology as an outpatient  4)  DVT Prophylaxis:  Lovenox 40 mg SQ every 24 hours  SCD's bilaterally      Admission Orders:  ICU/LEVEL 1 STEPDOWN  ACCUCHECK PER IV INSULIN PROTOCOL  PT/OT EVAL & TX  VENROSIO  Scheduled Meds:   Current Facility-Administered Medications:  acetaminophen 650 mg Oral Q6H PRN Alex Pickard DO    dextrose 5 % and sodium chloride 0 9 % 150 mL/hr Intravenous Continuous Jossie Bal, DO Last Rate: 150 mL/hr (03/15/18 0408)   enoxaparin 40 mg Subcutaneous Q24H Jossie Bal, DO    influenza vaccine 0 5 mL Intramuscular Prior to discharge Jossie Bal, DO    insulin regular (HumuLIN R,NovoLIN R) infusion 0 1-30 Units/hr Intravenous Continuous Jossie Bal, DO Last Rate: 2 Units/hr (03/14/18 2139)   ondansetron 4 mg Intravenous Q6H PRN Jossie Bal, DO    pneumococcal 23-valent polysaccharide vaccine 0 5 mL Subcutaneous Prior to discharge Jossie Bal, DO    sodium chloride 150 mL/hr Intravenous Continuous Jossie Bal, DO      Continuous Infusions:   dextrose 5 % and sodium chloride 0 9 % 150 mL/hr Last Rate: 150 mL/hr (03/15/18 0408)   insulin regular (HumuLIN R,NovoLIN R) infusion 0 1-30 Units/hr Last Rate: 2 Units/hr (03/14/18 2139)   sodium chloride 150 mL/hr      PRN Meds:   acetaminophen    influenza vaccine    ondansetron    pneumococcal 23-valent polysaccharide vaccine  Thank you,  [address] [facility] in the [facility] by Farrukh Hansen for 2017  Network Utilization Review Department  Phone: [phone]; Fax [phone]  ATTENTION: The Network Utilization Review Department is now centralized for our 7 Facilities  Make a note that we have a new phone and fax numbers for our Department  Please call with any questions or concerns to [phone] and carefully follow the prompts so that you are directed to the right person  All voicemails are confidential  Fax any determinations, approvals, denials, and requests for initial or continue stay review clinical to [phone]  Due to HIGH CALL volume, it would be easier if you could please send faxed requests to expedite your requests and in part, help us provide discharge notifications faster

## 2018-03-15 NOTE — PHYSICAL THERAPY NOTE
PT Evaluation     03/15/18 0802   Note Type   Note type Eval only   Pain Assessment   Pain Assessment No/denies pain   Pain Score No Pain   Home Living   Type of [address] One level; Able to live on main level with bedroom/bathroom; Performs ADLs on one level;Stairs to enter with rails  (3 MELINDA with HR)   Bathroom Shower/Tub Tub/shower unit   Bathroom Toilet Standard   Bathroom Accessibility Accessible   Prior Function   Level of [place] Independent with ADLs and functional mobility   Lives With Significant other   ADL Assistance Independent   IADLs Independent   Comments (I) with driving   Restrictions/Precautions   Other Precautions Multiple lines;Telemetry   General   Family/Caregiver Present No   Cognition   Arousal/Participation Alert   Orientation Level Oriented X4   Following Commands Follows all commands and directions without difficulty   RLE Assessment   RLE Assessment WFL  (4+ to 5/5)   LLE Assessment   LLE Assessment WFL  (4+ to 5/5)   Coordination   Sensation WFL   Light Touch   RLE Light Touch Grossly intact   LLE Light Touch Grossly intact   Bed Mobility   Additional Comments Pt seated in chair when PT entered room  Transfers   Sit to Stand 7  Independent   Stand to Sit 7  Independent   Stand pivot 7  Independent   Additional Comments resting vital signs HR 72, SpO2 96% /63 and after ambulation 94 SpO2 99% /72  Pt performing all functional mobility transfers and ambulation at an (I) level no complaint and no LOB   Ambulation/Elevation   Gait pattern WNL   Gait Assistance 7  Independent   Assistive Device None   Distance 200ft no A D   Independent   Balance   Static Sitting Good   Dynamic Sitting Good   Static Standing Good   Dynamic Standing Good   Ambulatory Good   Endurance Deficit   Endurance Deficit No   Activity Tolerance   Activity Tolerance Patient tolerated treatment well   Assessment   Prognosis Good   Assessment Pt is a 65year old male presenting with good strength balance endurance and functional mobility performing all bed mobility transfers and ambulation at an (I) level no A D  Pt safe to ambulate in room and hallway ad jennie  No skilled PT indicated  Goals   Patient Goals To feel better and return home   Plan   Treatment/Interventions Functional transfer training; Endurance training;Patient/family training;Bed mobility;Gait training   PT Frequency One time visit   Recommendation   Recommendation Home independently   PT - OK to Discharge Yes   No SCD's  Pt seated in chair at bedside with call bell in reach

## 2018-03-15 NOTE — H&P
H&P Exam - Sun Cha 65 y o  male MRN: [id number]    Unit/Bed#: RM10 Encounter: [id number]    Assessment:  Patient Active Problem List   Diagnosis    Diabetic ketoacidosis ([facility] [address] )    Type 2 diabetes mellitus with hyperglycemia ([facility] [address] )    Hyponatremia    Weight loss       Plan:  1)  Diabetic ketoacidosis/Type 2 diabetes mellitus with hyperglycemia and without long-term current insulin use:  Admit the patient to inpatient status, Level 1-Stepdown status  The patient will require at least a 2-midnight inpatient hospitalization for work-up and treatment of the symptomatology  Initiate an IV insulin drip/infusion per the DKA protocol  Monitor the patient's blood glucose levels every 1 hour  The patient will receive a total of 3000 ml of NSS IV fluids via initial boluses  He will then be continued on NSS IV fluids at 150 ml/hr  When the blood glucose decreases below 250 mg/dl, the IV fluids will be changed to D5 NSS IV fluids at 150 ml/hr  Check a BMP and a magnesium level every 4 hours  Check a HgbA1c level  He may need insulin therapy upon discharge  Check a urine microalbumin/creatinine level  The patient would benefit from a low-dose ACE-Inhibitor for renal protection depending on his blood pressure trend  The patient states that he does tend to run lower blood pressures  The patient should follow-up with his PCP in regards to the initiation of an ACE-Inhibitor for renal protection  I would recommend the patient following up with Endocrinology as an outpatient  He should also have regular Ophthalmology exams and Podiatry exams  2)  Hyponatremia: This is pseudohyponatremia secondary to hyperglycemia  In addition, he likely has a component of hypovolemic hyponatremia  Continue to monitor the patient's sodium level while his hyperglycemia is corrected  NSS IV fluids  Check a TSH  3)  Weight loss:  Check a chest xray    He will need outpatient follow-up with his PCP for age-appropriate cancer screening  He also should follow-up with Gastroenterology as an outpatient  4)  DVT Prophylaxis:  Lovenox 40 mg SQ every 24 hours  SCD's bilaterally  History of Present Illness   The patient is a 65year-old male who presented to the ED with the complaint of elevated blood glucose levels  The patient had previously lost his insurance coverage and was unable to obtain his oral diabetic medications secondary to cost   He has been off his oral diabetic medications for several months  He has been experiencing frequent hyperglycemia with blood glucose readings in the 400's and at times, the blood glucose level is critically high on the glucometer  The patient has been experiencing severe polydipsia and polyuria  In addition, he complains of a visual disturbance described as blurry vision  Nothing seemed to relieve his symptoms  In addition, the patient complains of a 40-50 pound weight loss over the last few months  No chest pain  No shortness of breath  He also has been experiencing bilateral foot pain over the last few weeks  He recently started a new job and now has insurance coverage  Additional associated symptoms include difficulty focusing and daytime fatigue  Review of Systems:  Per HPI, all other systems have been reviewed and were negative       Historical Information   Past Medical History:   Diagnosis Date    Diabetes mellitus ([facility] [address] )      Past Surgical History:   Procedure Laterality Date    HERNIA REPAIR       Social History   History   Alcohol Use    Yes     Comment: ocassional     History   Drug Use No     History   Smoking Status    Never Smoker   Smokeless Tobacco    Current User     Family History: non-contributory    Meds/Allergies   all medications and allergies reviewed  No Known Allergies    Objective   First Vitals:   Blood Pressure: 143/84 (03/14/18 1621)  Pulse: 90 (03/14/18 1621)  Temperature: (!) 96 8 °F (36 °C) (03/14/18 1621)  Temp Source: Temporal (03/14/18 1621)  Respirations: 20 (03/14/18 1621)  Weight - Scale: 112 kg (246 lb) (03/14/18 1621)  SpO2: 98 % (03/14/18 1621)    Current Vitals:   Blood Pressure: 115/67 (03/14/18 1900)  Pulse: 76 (03/14/18 1900)  Temperature: (!) 96 8 °F (36 °C) (03/14/18 1621)  Temp Source: Temporal (03/14/18 1621)  Respirations: 18 (03/14/18 1830)  Weight - Scale: 112 kg (246 lb) (03/14/18 1621)  SpO2: 97 % (03/14/18 1900)      Intake/Output Summary (Last 24 hours) at 03/14/18 2009  Last data filed at 03/14/18 1851   Gross per 24 hour   Intake             1000 ml   Output                0 ml   Net             1000 ml       Invasive Devices     Peripheral Intravenous Line            Peripheral IV 03/14/18 Left Antecubital less than 1 day                Physical Exam   General:  NAD, awake, alert, oriented x 3, follows commands  HEENT:  NC/AT, mucous membranes dry  Neck:  Supple, No JVP elevation  CV:  + S1, + S2, RRR  Pulm:  Lung fields are CTA bilaterally  Abd:  Soft, Non-tender, Non-distended  Ext:  No clubbing/cyanosis/edema      Lab Results:  Reviewed  Lab Results   Component Value Date    WBC 8 57 03/14/2018    HGB 16 1 03/14/2018    HCT 44 8 03/14/2018    MCV 80 (L) 03/14/2018     03/14/2018     Lab Results   Component Value Date    GLUCOSE 327 (H) 03/14/2018    CALCIUM 9 4 03/14/2018     (L) 03/14/2018    K 4 2 03/14/2018    CO2 23 03/14/2018    CL 97 (L) 03/14/2018    BUN 33 (H) 03/14/2018    CREATININE 1 12 03/14/2018     Lab Results   Component Value Date    ALT 27 03/14/2018    AST 12 03/14/2018    ALKPHOS 79 03/14/2018    BILITOT 0 50 03/14/2018         Code Status: Level 1-Full Code  Advance Directive and Living Will:      Power of :    POLST:      Counseling / Coordination of Care: Total floor / unit time spent today 45 minutes

## 2018-03-15 NOTE — DISCHARGE SUMMARY
Discharge Summary - [facility]    Patient Information: Bettina Herrmann 65 y o  male MRN: [id number]  Unit/Bed#:  Encounter: [id number]    Discharging Physician / Practitioner: Nii Martinez DO  PCP: Sumeet Williamson DO  Admission Date: 3/14/2018  Discharge Date: 03/15/18    Disposition:     Home    Reason for Admission:  Diabetic ketoacidosis    Discharge Diagnoses:     Principal Problem (Resolved):    Diabetic ketoacidosis ([facility] [address] )  Active Problems:    Type 2 diabetes mellitus with hyperglycemia ([facility] [address] )    Weight loss  Resolved Problems:    Hyponatremia      Hospital Course: Bettina Herrmann is a 65 y o  male patient who originally presented to the hospital on 3/14/2018 due to diabetic ketoacidosis, patient was treated with DKA protocol, IV fluids/IV insulin infusion  Patient had improvement in blood work, closed anion gap, was transition to subcutaneous insulin, will be discharged with basal insulin and outpatient follow-up with PCP  Condition at Discharge: good     Discharge Day Visit / Exam:     Subjective:  No pain  Vitals: Blood Pressure: 110/72 (03/15/18 0900)  Pulse: 66 (03/15/18 0900)  Temperature: (!) 97 4 °F (36 3 °C) (03/15/18 0818)  Temp Source: Temporal (03/15/18 0818)  Respirations: 19 (03/15/18 0900)  Height: 6' 3" (190 5 cm) (03/14/18 2050)  Weight - Scale: 113 kg (249 lb 9 oz) (03/15/18 0600)  SpO2: 97 % (03/15/18 0900)  Exam:   Physical Exam   Constitutional: He is oriented to person, place, and time  He appears well-developed and well-nourished  No distress  Pulmonary/Chest: Effort normal and breath sounds normal  No respiratory distress  He has no wheezes  Musculoskeletal: Normal range of motion  Neurological: He is alert and oriented to person, place, and time  No cranial nerve deficit  Coordination normal    Skin: He is not diaphoretic  Psychiatric: He has a normal mood and affect   His behavior is normal  Judgment and thought content normal    Nursing note and vitals reviewed  Discussion with Family:  Plan of care discussed with patient and his wife    Discharge instructions/Information to patient and family:   See after visit summary for information provided to patient and family  Provisions for Follow-Up Care:  See after visit summary for information related to follow-up care and any pertinent home health orders  Planned Readmission:  No     Discharge Statement:  I spent 35 minutes discharging the patient  This time was spent on the day of discharge  I had direct contact with the patient on the day of discharge  Greater than 50% of the total time was spent examining patient, answering all patient questions, arranging and discussing plan of care with patient as well as directly providing post-discharge instructions  Additional time then spent on discharge activities  Discharge Medications:  See after visit summary for reconciled discharge medications provided to patient and family        ** Please Note: This note has been constructed using a voice recognition system **

## 2018-03-15 NOTE — SOCIAL WORK
Met with pt to discuss role as  in helping pt to develop discharge plan and to help pt carry out their plan  Pt lives in a house with his SO   Pt has 3 MELINDA with a railing  Pt has no steps on the inside  Pt has no DME at home  Pt does the cooking  Pt and his girlfriend both drive  Pt has never had home care or any services in the home  Dr Ines Leiva is the pt's PCP  Pt uses [facility] in [address]

## 2018-03-15 NOTE — SOCIAL WORK
Pt is agreeable to the Cost of the insulin  Pt is being discharged today  Pt's SO will give the pt a ride home  Pt prefers to set up his follow up appointments himself  Reviewed with the pt that the cheapest place to get a glucometer is to get a rely on brand from [employer]  Case Management reviewed discharge planning process including the following: identifying help that is needed at home, pt's preference for discharge needs and Meds at [facility]  Reviewed with Pt that any member of the healthcare team can answer questions regarding : medications, jmportance of recognizing  Signs and symptoms of any  medical problems  Case Management also encouraged pt to follow up with all recommended appointments after discharge

## 2018-03-16 LAB — BACTERIA UR CULT: NORMAL

## 2018-03-29 ENCOUNTER — APPOINTMENT (OUTPATIENT)
Dept: LAB | Facility: MEDICAL CENTER | Age: 58
End: 2018-03-29
Payer: COMMERCIAL

## 2018-03-29 ENCOUNTER — TRANSCRIBE ORDERS (OUTPATIENT)
Dept: LAB | Facility: MEDICAL CENTER | Age: 58
End: 2018-03-29

## 2018-03-29 DIAGNOSIS — E11.65 TYPE 2 DIABETES MELLITUS WITH HYPERGLYCEMIA, WITHOUT LONG-TERM CURRENT USE OF INSULIN (HCC): ICD-10-CM

## 2018-03-29 DIAGNOSIS — E11.65 TYPE 2 DIABETES MELLITUS WITH HYPERGLYCEMIA, WITHOUT LONG-TERM CURRENT USE OF INSULIN (HCC): Primary | ICD-10-CM

## 2018-03-29 LAB
ALBUMIN SERPL BCP-MCNC: 3.2 G/DL (ref 3.5–5)
ALP SERPL-CCNC: 54 U/L (ref 46–116)
ALT SERPL W P-5'-P-CCNC: 31 U/L (ref 12–78)
ANION GAP SERPL CALCULATED.3IONS-SCNC: 4 MMOL/L (ref 4–13)
AST SERPL W P-5'-P-CCNC: 16 U/L (ref 5–45)
BILIRUB SERPL-MCNC: 0.53 MG/DL (ref 0.2–1)
BUN SERPL-MCNC: 14 MG/DL (ref 5–25)
CALCIUM SERPL-MCNC: 8.5 MG/DL (ref 8.3–10.1)
CHLORIDE SERPL-SCNC: 109 MMOL/L (ref 100–108)
CO2 SERPL-SCNC: 29 MMOL/L (ref 21–32)
CREAT SERPL-MCNC: 0.84 MG/DL (ref 0.6–1.3)
GFR SERPL CREATININE-BSD FRML MDRD: 98 ML/MIN/1.73SQ M
GLUCOSE P FAST SERPL-MCNC: 136 MG/DL (ref 65–99)
POTASSIUM SERPL-SCNC: 4.1 MMOL/L (ref 3.5–5.3)
PROT SERPL-MCNC: 6.7 G/DL (ref 6.4–8.2)
SODIUM SERPL-SCNC: 142 MMOL/L (ref 136–145)

## 2018-03-29 PROCEDURE — 36415 COLL VENOUS BLD VENIPUNCTURE: CPT

## 2018-03-29 PROCEDURE — 80053 COMPREHEN METABOLIC PANEL: CPT

## 2019-04-30 ENCOUNTER — OFFICE VISIT (OUTPATIENT)
Dept: FAMILY MEDICINE CLINIC | Facility: CLINIC | Age: 59
End: 2019-04-30
Payer: COMMERCIAL

## 2019-04-30 VITALS
SYSTOLIC BLOOD PRESSURE: 134 MMHG | DIASTOLIC BLOOD PRESSURE: 80 MMHG | BODY MASS INDEX: 33.32 KG/M2 | HEIGHT: 75 IN | WEIGHT: 268 LBS

## 2019-04-30 DIAGNOSIS — Z13.6 SCREENING FOR CARDIOVASCULAR CONDITION: ICD-10-CM

## 2019-04-30 DIAGNOSIS — Z12.5 PROSTATE CANCER SCREENING: ICD-10-CM

## 2019-04-30 DIAGNOSIS — E11.65 TYPE 2 DIABETES MELLITUS WITH HYPERGLYCEMIA, WITHOUT LONG-TERM CURRENT USE OF INSULIN (HCC): Primary | ICD-10-CM

## 2019-04-30 PROCEDURE — 99203 OFFICE O/P NEW LOW 30 MIN: CPT | Performed by: FAMILY MEDICINE

## 2019-04-30 PROCEDURE — 1036F TOBACCO NON-USER: CPT | Performed by: FAMILY MEDICINE

## 2019-04-30 PROCEDURE — 3008F BODY MASS INDEX DOCD: CPT | Performed by: FAMILY MEDICINE

## 2019-04-30 PROCEDURE — 4010F ACE/ARB THERAPY RXD/TAKEN: CPT | Performed by: FAMILY MEDICINE

## 2019-04-30 RX ORDER — ATORVASTATIN CALCIUM 10 MG/1
10 TABLET, FILM COATED ORAL DAILY
Qty: 30 TABLET | Refills: 5 | Status: SHIPPED | OUTPATIENT
Start: 2019-04-30 | End: 2019-10-29 | Stop reason: SDUPTHER

## 2019-04-30 RX ORDER — LOSARTAN POTASSIUM 25 MG/1
25 TABLET ORAL DAILY
Qty: 30 TABLET | Refills: 5 | Status: SHIPPED | OUTPATIENT
Start: 2019-04-30 | End: 2019-10-29 | Stop reason: SDUPTHER

## 2019-05-15 ENCOUNTER — APPOINTMENT (OUTPATIENT)
Dept: LAB | Facility: MEDICAL CENTER | Age: 59
End: 2019-05-15
Payer: COMMERCIAL

## 2019-05-15 LAB
ALBUMIN SERPL BCP-MCNC: 3.6 G/DL (ref 3.5–5)
ALP SERPL-CCNC: 78 U/L (ref 46–116)
ALT SERPL W P-5'-P-CCNC: 38 U/L (ref 12–78)
ANION GAP SERPL CALCULATED.3IONS-SCNC: 5 MMOL/L (ref 4–13)
AST SERPL W P-5'-P-CCNC: 19 U/L (ref 5–45)
BILIRUB SERPL-MCNC: 0.47 MG/DL (ref 0.2–1)
BUN SERPL-MCNC: 15 MG/DL (ref 5–25)
CALCIUM SERPL-MCNC: 8.7 MG/DL (ref 8.3–10.1)
CHLORIDE SERPL-SCNC: 105 MMOL/L (ref 100–108)
CHOLEST SERPL-MCNC: 187 MG/DL (ref 50–200)
CO2 SERPL-SCNC: 26 MMOL/L (ref 21–32)
CREAT SERPL-MCNC: 1.08 MG/DL (ref 0.6–1.3)
CREAT UR-MCNC: 178 MG/DL
EST. AVERAGE GLUCOSE BLD GHB EST-MCNC: 163 MG/DL
GFR SERPL CREATININE-BSD FRML MDRD: 75 ML/MIN/1.73SQ M
GLUCOSE P FAST SERPL-MCNC: 145 MG/DL (ref 65–99)
HBA1C MFR BLD: 7.3 % (ref 4.2–6.3)
HDLC SERPL-MCNC: 50 MG/DL (ref 40–60)
LDLC SERPL CALC-MCNC: 115 MG/DL (ref 0–100)
MICROALBUMIN UR-MCNC: 33.8 MG/L (ref 0–20)
MICROALBUMIN/CREAT 24H UR: 19 MG/G CREATININE (ref 0–30)
POTASSIUM SERPL-SCNC: 4.4 MMOL/L (ref 3.5–5.3)
PROT SERPL-MCNC: 7.2 G/DL (ref 6.4–8.2)
PSA SERPL-MCNC: 0.4 NG/ML (ref 0–4)
SODIUM SERPL-SCNC: 136 MMOL/L (ref 136–145)
TRIGL SERPL-MCNC: 112 MG/DL
TSH SERPL DL<=0.05 MIU/L-ACNC: 3.72 UIU/ML (ref 0.36–3.74)

## 2019-05-15 PROCEDURE — 36415 COLL VENOUS BLD VENIPUNCTURE: CPT | Performed by: FAMILY MEDICINE

## 2019-05-15 PROCEDURE — 3045F PR MOST RECENT HEMOGLOBIN A1C LEVEL 7.0-9.0%: CPT | Performed by: FAMILY MEDICINE

## 2019-05-15 PROCEDURE — G0103 PSA SCREENING: HCPCS | Performed by: FAMILY MEDICINE

## 2019-05-15 PROCEDURE — 82570 ASSAY OF URINE CREATININE: CPT | Performed by: FAMILY MEDICINE

## 2019-05-15 PROCEDURE — 3061F NEG MICROALBUMINURIA REV: CPT | Performed by: FAMILY MEDICINE

## 2019-05-15 PROCEDURE — 80061 LIPID PANEL: CPT | Performed by: FAMILY MEDICINE

## 2019-05-15 PROCEDURE — 80053 COMPREHEN METABOLIC PANEL: CPT | Performed by: FAMILY MEDICINE

## 2019-05-15 PROCEDURE — 84443 ASSAY THYROID STIM HORMONE: CPT | Performed by: FAMILY MEDICINE

## 2019-05-15 PROCEDURE — 82043 UR ALBUMIN QUANTITATIVE: CPT | Performed by: FAMILY MEDICINE

## 2019-05-15 PROCEDURE — 83036 HEMOGLOBIN GLYCOSYLATED A1C: CPT | Performed by: FAMILY MEDICINE

## 2019-07-30 ENCOUNTER — OFFICE VISIT (OUTPATIENT)
Dept: FAMILY MEDICINE CLINIC | Facility: CLINIC | Age: 59
End: 2019-07-30
Payer: COMMERCIAL

## 2019-07-30 VITALS
BODY MASS INDEX: 35.16 KG/M2 | SYSTOLIC BLOOD PRESSURE: 116 MMHG | WEIGHT: 282.8 LBS | HEIGHT: 75 IN | DIASTOLIC BLOOD PRESSURE: 78 MMHG

## 2019-07-30 DIAGNOSIS — G89.29 CHRONIC PAIN OF RIGHT THUMB: ICD-10-CM

## 2019-07-30 DIAGNOSIS — M79.644 CHRONIC PAIN OF RIGHT THUMB: ICD-10-CM

## 2019-07-30 DIAGNOSIS — E11.65 TYPE 2 DIABETES MELLITUS WITH HYPERGLYCEMIA, WITHOUT LONG-TERM CURRENT USE OF INSULIN (HCC): Primary | ICD-10-CM

## 2019-07-30 PROCEDURE — 3008F BODY MASS INDEX DOCD: CPT | Performed by: FAMILY MEDICINE

## 2019-07-30 PROCEDURE — 99213 OFFICE O/P EST LOW 20 MIN: CPT | Performed by: FAMILY MEDICINE

## 2019-07-30 PROCEDURE — 92250 FUNDUS PHOTOGRAPHY W/I&R: CPT | Performed by: FAMILY MEDICINE

## 2019-07-30 PROCEDURE — 1036F TOBACCO NON-USER: CPT | Performed by: FAMILY MEDICINE

## 2019-07-30 NOTE — PROGRESS NOTES
Assessment/Plan:  Patient will continue to check his sugars, if they remain in the 190s, he will increase his Lantus to 30 units, he will call us if he does that  For his thumb, we will refer to Orthopedics  Patient will get blood work in September, follow-up here in 3 months or p r n     Problem List Items Addressed This Visit        Endocrine    Type 2 diabetes mellitus with hyperglycemia ([address] ) - Primary    Relevant Orders    IRIS Diabetic eye exam    Comprehensive metabolic panel    Lipid Panel with Direct LDL reflex    Hemoglobin A1C       Other    Chronic pain of right thumb    Relevant Orders    Ambulatory referral to Orthopedic Surgery           Diagnoses and all orders for this visit:    Type 2 diabetes mellitus with hyperglycemia, without long-term current use of insulin (HCC)  -     IRIS Diabetic eye exam  -     Comprehensive metabolic panel  -     Lipid Panel with Direct LDL reflex  -     Hemoglobin A1C    Chronic pain of right thumb  -     Ambulatory referral to Orthopedic Surgery; Future        No problem-specific Assessment & Plan notes found for this encounter  Subjective:      Patient ID: Frank Lowe is a 63 y o  male  Patient here today for follow-up  Patient denies any chest pain or shortness of breath  Patient has been dealing with right thumb pain and locking for the past couple months, it is getting worse  Patient has been noticing his sugars have gotten higher over the past couple weeks, sometimes as high as 190  Patient feels his diet has not changed at all  Diabetes   He presents for his follow-up diabetic visit  He has type 2 diabetes mellitus  His disease course has been stable  There are no hypoglycemic associated symptoms  There are no diabetic associated symptoms  There are no hypoglycemic complications  There are no diabetic complications  Risk factors for coronary artery disease include diabetes mellitus, dyslipidemia, hypertension and male sex   Current diabetic treatment includes insulin injections and oral agent (monotherapy)  He is compliant with treatment all of the time  His weight is stable  He is following a generally healthy diet  When asked about meal planning, he reported none  He has not had a previous visit with a dietitian  He participates in exercise intermittently  His home blood glucose trend is increasing steadily  An ACE inhibitor/angiotensin II receptor blocker is being taken  Eye exam is current  Hand Pain    There was no injury mechanism  Pain location: Right thumb  The quality of the pain is described as aching  The pain is moderate  The pain has been constant since the incident  The symptoms are aggravated by movement  He has tried nothing for the symptoms  The following portions of the patient's history were reviewed and updated as appropriate:   He has a past medical history of Diabetes mellitus ([address] )  ,  does not have any pertinent problems on file  ,   has a past surgical history that includes Hernia repair  ,  family history includes Cancer in his father; No Known Problems in his mother  ,   reports that he has never smoked  He has never used smokeless tobacco  He reports that he drinks alcohol  He reports that he does not use drugs  ,  has No Known Allergies     Current Outpatient Medications   Medication Sig Dispense Refill    atorvastatin (LIPITOR) 10 mg tablet Take 1 tablet (10 mg total) by mouth daily 30 tablet 5    insulin glargine (LANTUS SOLOSTAR) 100 units/mL injection pen Inject 24 Units under the skin daily 5 pen 5    losartan (COZAAR) 25 mg tablet Take 1 tablet (25 mg total) by mouth daily 30 tablet 5    metFORMIN (GLUCOPHAGE) 500 mg tablet Take 1 tablet (500 mg total) by mouth 2 (two) times a day with meals 60 tablet 0     No current facility-administered medications for this visit  Review of Systems   Constitutional: Negative  Respiratory: Negative  Cardiovascular: Negative  Gastrointestinal: Negative  Genitourinary: Negative  Musculoskeletal: Positive for arthralgias (Right thumb)  Objective:  Vitals:    07/30/19 1417   BP: 116/78   Weight: 128 kg (282 lb 12 8 oz)   Height: 6' 3" (1 905 m)     Body mass index is 35 35 kg/m²  Physical Exam   Constitutional: He is oriented to person, place, and time  He appears well-developed and well-nourished  No distress  HENT:   Head: Normocephalic and atraumatic  Eyes: Conjunctivae are normal    Cardiovascular: Normal rate, regular rhythm and normal heart sounds  Exam reveals no gallop and no friction rub  No murmur heard  Pulmonary/Chest: Effort normal and breath sounds normal  No respiratory distress  He has no wheezes  He has no rales  Musculoskeletal: He exhibits tenderness (Right thumb)  He exhibits no edema  Neurological: He is alert and oriented to person, place, and time  Skin: He is not diaphoretic  Psychiatric: He has a normal mood and affect  His behavior is normal  Judgment and thought content normal    Vitals reviewed

## 2019-07-31 LAB
LEFT EYE DIABETIC RETINOPATHY: NORMAL
LEFT EYE IMAGE QUALITY: NORMAL
LEFT EYE MACULAR EDEMA: NORMAL
LEFT EYE OTHER RETINOPATHY: NORMAL
RIGHT EYE DIABETIC RETINOPATHY: NORMAL
RIGHT EYE IMAGE QUALITY: NORMAL
RIGHT EYE MACULAR EDEMA: NORMAL
RIGHT EYE OTHER RETINOPATHY: NORMAL
SEVERITY (EYE EXAM): NORMAL

## 2019-07-31 PROCEDURE — 3072F LOW RISK FOR RETINOPATHY: CPT | Performed by: FAMILY MEDICINE

## 2019-09-04 ENCOUNTER — OFFICE VISIT (OUTPATIENT)
Dept: FAMILY MEDICINE CLINIC | Facility: CLINIC | Age: 59
End: 2019-09-04
Payer: COMMERCIAL

## 2019-09-04 VITALS
DIASTOLIC BLOOD PRESSURE: 92 MMHG | BODY MASS INDEX: 34.76 KG/M2 | HEIGHT: 75 IN | SYSTOLIC BLOOD PRESSURE: 132 MMHG | WEIGHT: 279.6 LBS

## 2019-09-04 DIAGNOSIS — E11.65 TYPE 2 DIABETES MELLITUS WITH HYPERGLYCEMIA, WITHOUT LONG-TERM CURRENT USE OF INSULIN (HCC): Primary | ICD-10-CM

## 2019-09-04 PROCEDURE — 3008F BODY MASS INDEX DOCD: CPT | Performed by: FAMILY MEDICINE

## 2019-09-04 PROCEDURE — 99213 OFFICE O/P EST LOW 20 MIN: CPT | Performed by: FAMILY MEDICINE

## 2019-09-04 NOTE — PROGRESS NOTES
Assessment/Plan: We will increase his metformin to 1000 mg twice a day  He will continue his Lantus at 30 units daily  He will call us in the next week or so with numbers, hoping that they have come down  With his next blood work, we will get an insulin level on him  We will see him back as scheduled on October 30th or p r n  Problem List Items Addressed This Visit        Endocrine    Type 2 diabetes mellitus with hyperglycemia ([facility] [address] ) - Primary    Relevant Medications    metFORMIN (GLUCOPHAGE) 1000 MG tablet    Other Relevant Orders    Insulin, fasting           Diagnoses and all orders for this visit:    Type 2 diabetes mellitus with hyperglycemia, without long-term current use of insulin ([facility])  -     metFORMIN (GLUCOPHAGE) 1000 MG tablet; Take 1 tablet (1,000 mg total) by mouth 2 (two) times a day with meals  -     Insulin, fasting        No problem-specific Assessment & Plan notes found for this encounter  Subjective:      Patient ID: Claudetta Casey is a 63 y o  male  Patient here today for follow-up on his diabetes  His sugars have seem to gone up, in the mornings they are always in the 300s  This morning it was 325  Before he came here, his sugar was 225, last ate at 11:00 a m  (4 hours postprandial)  He denies any abdominal pain, no fever chills  He states he has really been watching his diet, limiting his carbs  Diabetes   He presents for his follow-up diabetic visit  He has type 2 diabetes mellitus  His disease course has been worsening  There are no hypoglycemic associated symptoms  There are no diabetic associated symptoms  There are no hypoglycemic complications  There are no diabetic complications  Risk factors for coronary artery disease include diabetes mellitus and dyslipidemia  Current diabetic treatment includes insulin injections and oral agent (monotherapy)  He is compliant with treatment all of the time  His weight is stable  He is following a generally healthy diet   When asked about meal planning, he reported none  He has not had a previous visit with a dietitian  He participates in exercise intermittently  His home blood glucose trend is increasing steadily  His breakfast blood glucose range is generally >200 mg/dl  His dinner blood glucose range is generally 180-200 mg/dl  An ACE inhibitor/angiotensin II receptor blocker is not being taken  The following portions of the patient's history were reviewed and updated as appropriate:   He has a past medical history of Diabetes mellitus ([address] )  ,  does not have any pertinent problems on file  ,   has a past surgical history that includes Hernia repair  ,  family history includes Cancer in his father; No Known Problems in his mother  ,   reports that he has never smoked  He has never used smokeless tobacco  He reports that he drinks alcohol  He reports that he does not use drugs  ,  is allergic to jardiance [empagliflozin]     Current Outpatient Medications   Medication Sig Dispense Refill    atorvastatin (LIPITOR) 10 mg tablet Take 1 tablet (10 mg total) by mouth daily 30 tablet 5    insulin glargine (LANTUS SOLOSTAR) 100 units/mL injection pen Inject 24 Units under the skin daily (Patient taking differently: Inject 30 Units under the skin daily ) 5 pen 5    losartan (COZAAR) 25 mg tablet Take 1 tablet (25 mg total) by mouth daily 30 tablet 5    metFORMIN (GLUCOPHAGE) 1000 MG tablet Take 1 tablet (1,000 mg total) by mouth 2 (two) times a day with meals 60 tablet 5     No current facility-administered medications for this visit  Review of Systems   Constitutional: Negative  Respiratory: Negative  Cardiovascular: Negative  Gastrointestinal: Negative  Genitourinary: Negative  Objective:  Vitals:    09/04/19 1534   BP: 132/92   Weight: 127 kg (279 lb 9 6 oz)   Height: 6' 3" (1 905 m)     Body mass index is 34 95 kg/m²  Physical Exam   Constitutional: He is oriented to person, place, and time   He appears well-developed and well-nourished  No distress  HENT:   Head: Normocephalic and atraumatic  Eyes: Conjunctivae are normal    Neurological: He is alert and oriented to person, place, and time  Skin: He is not diaphoretic  Psychiatric: He has a normal mood and affect  His behavior is normal  Judgment and thought content normal    Vitals reviewed

## 2019-10-24 ENCOUNTER — APPOINTMENT (OUTPATIENT)
Dept: LAB | Facility: MEDICAL CENTER | Age: 59
End: 2019-10-24
Payer: COMMERCIAL

## 2019-10-24 LAB
ALBUMIN SERPL BCP-MCNC: 4 G/DL (ref 3.5–5)
ALP SERPL-CCNC: 92 U/L (ref 46–116)
ALT SERPL W P-5'-P-CCNC: 22 U/L (ref 12–78)
ANION GAP SERPL CALCULATED.3IONS-SCNC: 5 MMOL/L (ref 4–13)
AST SERPL W P-5'-P-CCNC: 5 U/L (ref 5–45)
BILIRUB SERPL-MCNC: 0.52 MG/DL (ref 0.2–1)
BUN SERPL-MCNC: 18 MG/DL (ref 5–25)
CALCIUM SERPL-MCNC: 9.4 MG/DL (ref 8.3–10.1)
CHLORIDE SERPL-SCNC: 106 MMOL/L (ref 100–108)
CHOLEST SERPL-MCNC: 193 MG/DL (ref 50–200)
CO2 SERPL-SCNC: 27 MMOL/L (ref 21–32)
CREAT SERPL-MCNC: 0.96 MG/DL (ref 0.6–1.3)
EST. AVERAGE GLUCOSE BLD GHB EST-MCNC: 243 MG/DL
GFR SERPL CREATININE-BSD FRML MDRD: 87 ML/MIN/1.73SQ M
GLUCOSE P FAST SERPL-MCNC: 281 MG/DL (ref 65–99)
HBA1C MFR BLD: 10.1 % (ref 4.2–6.3)
HDLC SERPL-MCNC: 43 MG/DL
INSULIN SERPL-ACNC: 23.3 MU/L (ref 3–25)
LDLC SERPL CALC-MCNC: 110 MG/DL (ref 0–100)
POTASSIUM SERPL-SCNC: 4.1 MMOL/L (ref 3.5–5.3)
PROT SERPL-MCNC: 7.5 G/DL (ref 6.4–8.2)
SODIUM SERPL-SCNC: 138 MMOL/L (ref 136–145)
TRIGL SERPL-MCNC: 200 MG/DL

## 2019-10-24 PROCEDURE — 83036 HEMOGLOBIN GLYCOSYLATED A1C: CPT | Performed by: FAMILY MEDICINE

## 2019-10-24 PROCEDURE — 80053 COMPREHEN METABOLIC PANEL: CPT | Performed by: FAMILY MEDICINE

## 2019-10-24 PROCEDURE — 80061 LIPID PANEL: CPT | Performed by: FAMILY MEDICINE

## 2019-10-24 PROCEDURE — 83525 ASSAY OF INSULIN: CPT | Performed by: FAMILY MEDICINE

## 2019-10-24 PROCEDURE — 36415 COLL VENOUS BLD VENIPUNCTURE: CPT | Performed by: FAMILY MEDICINE

## 2019-10-29 DIAGNOSIS — E11.65 TYPE 2 DIABETES MELLITUS WITH HYPERGLYCEMIA, WITHOUT LONG-TERM CURRENT USE OF INSULIN (HCC): ICD-10-CM

## 2019-10-29 RX ORDER — ATORVASTATIN CALCIUM 10 MG/1
10 TABLET, FILM COATED ORAL DAILY
Qty: 30 TABLET | Refills: 5 | Status: SHIPPED | OUTPATIENT
Start: 2019-10-29 | End: 2020-04-20

## 2019-10-29 RX ORDER — LOSARTAN POTASSIUM 25 MG/1
25 TABLET ORAL DAILY
Qty: 30 TABLET | Refills: 5 | Status: SHIPPED | OUTPATIENT
Start: 2019-10-29 | End: 2020-04-20

## 2019-10-30 ENCOUNTER — OFFICE VISIT (OUTPATIENT)
Dept: FAMILY MEDICINE CLINIC | Facility: CLINIC | Age: 59
End: 2019-10-30
Payer: COMMERCIAL

## 2019-10-30 VITALS
DIASTOLIC BLOOD PRESSURE: 80 MMHG | SYSTOLIC BLOOD PRESSURE: 118 MMHG | HEIGHT: 75 IN | WEIGHT: 267.8 LBS | BODY MASS INDEX: 33.3 KG/M2

## 2019-10-30 DIAGNOSIS — E11.65 TYPE 2 DIABETES MELLITUS WITH HYPERGLYCEMIA, WITHOUT LONG-TERM CURRENT USE OF INSULIN (HCC): Primary | ICD-10-CM

## 2019-10-30 DIAGNOSIS — Z23 ENCOUNTER FOR IMMUNIZATION: ICD-10-CM

## 2019-10-30 PROCEDURE — 90682 RIV4 VACC RECOMBINANT DNA IM: CPT | Performed by: FAMILY MEDICINE

## 2019-10-30 PROCEDURE — 99213 OFFICE O/P EST LOW 20 MIN: CPT | Performed by: FAMILY MEDICINE

## 2019-10-30 PROCEDURE — 90471 IMMUNIZATION ADMIN: CPT | Performed by: FAMILY MEDICINE

## 2019-11-02 DIAGNOSIS — E11.65 TYPE 2 DIABETES MELLITUS WITH HYPERGLYCEMIA, WITHOUT LONG-TERM CURRENT USE OF INSULIN (HCC): Primary | ICD-10-CM

## 2019-11-04 ENCOUNTER — TELEPHONE (OUTPATIENT)
Dept: FAMILY MEDICINE CLINIC | Facility: CLINIC | Age: 59
End: 2019-11-04

## 2019-11-04 RX ORDER — BLOOD SUGAR DIAGNOSTIC
STRIP MISCELLANEOUS
Qty: 50 EACH | Refills: 3 | Status: SHIPPED | OUTPATIENT
Start: 2019-11-04

## 2019-11-04 NOTE — TELEPHONE ENCOUNTER
The dose increase might have been too quick  I did not expect it to totally control his sugars yet  He should not increase the dose by more than 2 units a day, and no more frequently than 1 to 2 times a week  If he is feeling weak and dizzy, he probably should be evaluated in the ER to make sure he is okay

## 2019-11-04 NOTE — TELEPHONE ENCOUNTER
INSULIN WAS CHANGED ON OCT 31ST  HE WAS TO TAKE 10 UNITS HS  HE INCREASED IT TO 20 UNITS  IT IS NOT CONTROLLING THE SUGARS, HE IS DIZZY, HAS NAUSEA, TIRED, AND WEAK  SUGARS IN THE MORNING WERE RUNNING AROUND 430   WHAT TO DO

## 2019-11-13 ENCOUNTER — CONSULT (OUTPATIENT)
Dept: ENDOCRINOLOGY | Facility: CLINIC | Age: 59
End: 2019-11-13
Payer: COMMERCIAL

## 2019-11-13 VITALS
SYSTOLIC BLOOD PRESSURE: 132 MMHG | BODY MASS INDEX: 33.4 KG/M2 | DIASTOLIC BLOOD PRESSURE: 70 MMHG | WEIGHT: 268.6 LBS | HEIGHT: 75 IN | HEART RATE: 89 BPM

## 2019-11-13 DIAGNOSIS — M79.644 CHRONIC PAIN OF RIGHT THUMB: ICD-10-CM

## 2019-11-13 DIAGNOSIS — E78.2 MIXED HYPERLIPIDEMIA: ICD-10-CM

## 2019-11-13 DIAGNOSIS — R80.9 MICROALBUMINURIA: ICD-10-CM

## 2019-11-13 DIAGNOSIS — E66.9 OBESITY (BMI 30-39.9): ICD-10-CM

## 2019-11-13 DIAGNOSIS — G89.29 CHRONIC PAIN OF RIGHT THUMB: ICD-10-CM

## 2019-11-13 DIAGNOSIS — Z79.4 TYPE 2 DIABETES MELLITUS WITH HYPERGLYCEMIA, WITH LONG-TERM CURRENT USE OF INSULIN (HCC): Primary | ICD-10-CM

## 2019-11-13 DIAGNOSIS — E11.65 TYPE 2 DIABETES MELLITUS WITH HYPERGLYCEMIA, WITH LONG-TERM CURRENT USE OF INSULIN (HCC): Primary | ICD-10-CM

## 2019-11-13 PROCEDURE — 99245 OFF/OP CONSLTJ NEW/EST HI 55: CPT | Performed by: INTERNAL MEDICINE

## 2019-11-13 NOTE — PROGRESS NOTES
Hattie Botello 63 y o  male MRN: [id number]    Encounter: [id number]  Referring Provider  Jc Guzman,   [address]  [address],  [address] [address]    Assessment/Plan     Assessment: This is a 63y o -year-old male with history of type 2 diabetes mellitus, hyperlipidemia, obesity    Plan:  1  Type 2 diabetes mellitus long-term insulin therapy  Poorly controlled with last A1c 10 1%, hyperglycemic throughout the day based on blood sugar log  Given typical symptoms of polyuria, polydipsia, weight loss, history of diabetic ketoacidosis, this time will treat with basal bolus insulin  Recommend the following at this time  continue Lantus 36 units subcutaneously at bedtime   Start Humalog 10 units subcutaneously with meals 3 times a day   Continue metformin 1 g orally twice a day  - start sliding scale insulin    - check blood sugars qAC and HS   - Advised patient to send blood sugar log for review in 1-2 weeks  -check GENE, anti islet antibody  -once blood sugars are better, will also check C-peptide level  If high can consider non insulin medications  - referred for diabetes education/medical nutrition therapy  - repeat A1c, BMP, urine microalbumin, lipid panel in 3 months    - Recommended a consistent carbohydrate diet   - weight control and exercise as discussed ( ideal is 30 min, at least 5 times a week)   - home glucose monitoring and goals emphasized, requested patient bring in glucose monitor or log sheets to next visit   - discussed signs and symptoms of hypoglycemia and how to correct them appropriately  - counseled about the long term complications of uncontrolled diabetes, including, Nephropathy, Neuropathy, CVD, Retinopathy and importance  of adherence to diet, treatment plan and life style modifications   - importance of following up with Opthalmology and podiatry   - glycohemoglobin and other lab monitoring discussed, A1c goal value reviewed  - long term diabetic complications discussed    2  Hyperlipidemia  - continue statin therapy    3  Hypertension  Blood pressure at goal  - continue ACE-I/ ARB    4  Obesity - diet and lifestyle as discussed  5  Pain/locking of the right thumb-likely trigger finger  Consult with orthopedics  If needed, Would avoid steroid injections till blood sugars have improved     CC: Diabetes    History of Present Illness     HPI:  Megan Cain is a 63 y o  male presents for a new visit regarding diabetes management  DM history:   Diagnosed around   No complications of CAD/ CVA/CKD  Last Eye exam: retinal pictures 2019 - no      intitially on metformin and jardiance, was unable to continue latter due to insurance reasons and unfortunately was admitted to the hospital with DKA in 2018 at which time he was started on insulin - basal insulin  Noticed bg were trending up and so Lantus dose was increased to 30 units and in follow-up with his PCP when A1c >10%  started on xulotophy 3 weeks ago  Noted blood sugars  were higher in the 500-600 and so himself discontinued and re-started lantus  Used his sister's humalog using 10-12 units with improvement in BG     Current regimen:   Metformin 1000 mg orally twice a day  lantus 36 units at bedtime   if bg> 300 taking 10-12 units upto 2 times a day   Using this combination for atleast a week     Statin:  Atorvastatin 10  ACE-I/ARB:  Losartan - on history appears this was given for kidney protection rather than hypertension    Noticed Blurry vision   Has polyuria and polydipsia including nocturia   Appetite is good  Lost 25 lbs in the last 2-3 months  Denies numbness or tingling in the hands or feet  Gets cramps  No chest pain  Gets winded on exertion / heavy work  No diarrhea/ constipation  Has right thumb pain/decreased movements-on history appears to be trigger finger  Home glucose monitorin to 6 times a day  Ranging 200-486  Never had hypoglycemia    All other systems were reviewed and are negative      Review of Systems      Historical Information   Past Medical History:   Diagnosis Date    Diabetes mellitus ([address] )      Past Surgical History:   Procedure Laterality Date    HERNIA REPAIR       Social History   Social History     Substance and Sexual Activity   Alcohol Use Yes    Frequency: 2-4 times a month    Drinks per session: 1 or 2    Comment: ocassional     Social History     Substance and Sexual Activity   Drug Use Never     Social History     Tobacco Use   Smoking Status Never Smoker   Smokeless Tobacco Never Used     Family History:   Family History   Problem Relation Age of Onset    No Known Problems Mother     Cancer Father        Meds/Allergies   Current Outpatient Medications   Medication Sig Dispense Refill    atorvastatin (LIPITOR) 10 mg tablet Take 1 tablet (10 mg total) by mouth daily 30 tablet 5    CONTOUR NEXT TEST test strip USE DAILY AS DIRECTED 50 each 3    losartan (COZAAR) 25 mg tablet Take 1 tablet (25 mg total) by mouth daily 30 tablet 5    metFORMIN (GLUCOPHAGE) 1000 MG tablet Take 1 tablet (1,000 mg total) by mouth 2 (two) times a day with meals 60 tablet 5    Insulin Degludec-Liraglutide (XULTOPHY) 100 units-3 6 mg/mL injection pen Inject 10 Units under the skin daily (Patient not taking: Reported on 11/13/2019) 5 pen 5     No current facility-administered medications for this visit  Allergies   Allergen Reactions    Jardiance [Empagliflozin]      Ketoacidosis       Objective   Vitals: Blood pressure 132/70, pulse 89, height 6' 3" (1 905 m), weight 122 kg (268 lb 9 6 oz)  Physical Exam   Constitutional: He is oriented to person, place, and time  He appears well-developed and well-nourished  No distress  HENT:   Head: Normocephalic and atraumatic  Eyes: Pupils are equal, round, and reactive to light  Conjunctivae are normal    Neck: Normal range of motion  Neck supple  No thyromegaly present  Cardiovascular: Normal rate, regular rhythm and normal heart sounds     No murmur heard  Pulmonary/Chest: Effort normal and breath sounds normal  No respiratory distress  He has no wheezes  Abdominal: Soft  He exhibits no distension  There is no tenderness  There is no guarding  Musculoskeletal: He exhibits no edema  Neurological: He is alert and oriented to person, place, and time  Shoes and socks were removed  No cuts or breaks in the skin  Some nail discoloration, thickening present, patient states that he has had this for many years  Sensations intact to monofilament testing bilaterally  DP 2+   Skin: Skin is warm and dry  No rash noted  He is not diaphoretic  No erythema  Psychiatric: He has a normal mood and affect  His behavior is normal  Thought content normal    Vitals reviewed  The history was obtained from the review of the chart, patient and family  Lab Results:   Lab Results   Component Value Date/Time    Hemoglobin A1C 10 1 (H) 10/24/2019 07:16 AM    Hemoglobin A1C 7 3 (H) 05/15/2019 07:50 AM    BUN 18 10/24/2019 07:16 AM    BUN 15 05/15/2019 07:50 AM    Potassium 4 1 10/24/2019 07:16 AM    Potassium 4 4 05/15/2019 07:50 AM    Chloride 106 10/24/2019 07:16 AM    Chloride 105 05/15/2019 07:50 AM    CO2 27 10/24/2019 07:16 AM    CO2 26 05/15/2019 07:50 AM    Creatinine 0 96 10/24/2019 07:16 AM    Creatinine 1 08 05/15/2019 07:50 AM    AST 5 10/24/2019 07:16 AM    AST 19 05/15/2019 07:50 AM    ALT 22 10/24/2019 07:16 AM    ALT 38 05/15/2019 07:50 AM    Albumin 4 0 10/24/2019 07:16 AM    Albumin 3 6 05/15/2019 07:50 AM    HDL, Direct 43 10/24/2019 07:16 AM    HDL, Direct 50 05/15/2019 07:50 AM    Triglycerides 200 (H) 10/24/2019 07:16 AM    Triglycerides 112 05/15/2019 07:50 AM           Imaging Studies: I have personally reviewed pertinent reports  Portions of the record may have been created with voice recognition software   Occasional wrong word or "sound a like" substitutions may have occurred due to the inherent limitations of voice recognition software  Read the chart carefully and recognize, using context, where substitutions have occurred

## 2019-11-13 NOTE — PATIENT INSTRUCTIONS
continue Lantus 36 units subcutaneously at bedtime   Start Humalog 10 units subcutaneously with meals 3 times a day     In addition, please use humalog insulin per the following sliding scale to correct high blood sugars:  BG  151-200: 1 unit  201-250: 2 units  251-300: 3 units  301-350: 4 units  > 350: 5 units  Do not correct bed time highs unless > 200 mg/dl     please check blood sugars before meals and at bedtime, keep a log  Please send log for review in 1-2 weeks           Hypoglycemia instructions   Low Blood Sugar    Steps to treat low blood sugar  1  Test blood sugar if you have symptoms of low blood sugar:   Low Blood Sugar Symptoms:  o Sweaty  o Dizzy  o Rapid heartbeat  o Shaky    o Bad mood  o Hungry      2  Treat blood sugar less than 70 with 15 grams of fast-acting carbohydrate:   Examples of 15 grams Fast-Acting Carbohydrate:  o 4 oz juice  o 4 oz regular soda  o 3-4 glucose tablets (chew)  o 3-4 hard candies (chew)              3    Wait 15 minutes and test your blood sugar again           4   If blood sugar is less than 100, repeat steps 2-3       5  When your blood sugar is 100 or more, eat a snack if it will be longer than one hour until your next meal  The snack should be 15 grams of carbohydrate and a protein:   Examples of snacks:  o ½ sandwich  o 6 crackers with cheese  o Piece of fruit with cheese or peanut butter  o 6 crackers with peanut butter

## 2019-12-31 ENCOUNTER — OFFICE VISIT (OUTPATIENT)
Dept: ENDOCRINOLOGY | Facility: CLINIC | Age: 59
End: 2019-12-31
Payer: COMMERCIAL

## 2019-12-31 VITALS
HEIGHT: 75 IN | SYSTOLIC BLOOD PRESSURE: 126 MMHG | DIASTOLIC BLOOD PRESSURE: 80 MMHG | HEART RATE: 69 BPM | WEIGHT: 278.7 LBS | BODY MASS INDEX: 34.65 KG/M2

## 2019-12-31 DIAGNOSIS — Z79.4 TYPE 2 DIABETES MELLITUS WITH HYPOGLYCEMIA WITHOUT COMA, WITH LONG-TERM CURRENT USE OF INSULIN (HCC): ICD-10-CM

## 2019-12-31 DIAGNOSIS — E78.2 MIXED HYPERLIPIDEMIA: Primary | ICD-10-CM

## 2019-12-31 DIAGNOSIS — E11.649 TYPE 2 DIABETES MELLITUS WITH HYPOGLYCEMIA WITHOUT COMA, WITH LONG-TERM CURRENT USE OF INSULIN (HCC): ICD-10-CM

## 2019-12-31 DIAGNOSIS — R19.7 DIARRHEA, UNSPECIFIED TYPE: ICD-10-CM

## 2019-12-31 DIAGNOSIS — R80.9 MICROALBUMINURIA: ICD-10-CM

## 2019-12-31 DIAGNOSIS — E66.9 OBESITY (BMI 30-39.9): ICD-10-CM

## 2019-12-31 PROCEDURE — 99215 OFFICE O/P EST HI 40 MIN: CPT | Performed by: INTERNAL MEDICINE

## 2019-12-31 NOTE — PROGRESS NOTES
Karli Paniagua 63 y o  male MRN: [id number]    Encounter: [id number]      Assessment/Plan     Assessment: This is a 63y o -year-old male with type 2 diabetes mellitus, hyperlipidemia, obesity    Plan:  1  Type 2 diabetes mellitus on long-term insulin therapy with hypoglycemia  Improvement in blood sugars overall however concerning that the patient has been having frequent episodes of hypoglycemia  Hypoglycemia is most prominent pre dinner  Recommend the following at this time  Continue metformin 1000 mg orally twice a day  Continue Lantus 36 units subcutaneously at bedtime   Decrease Humalog to 8 units with breakfast, lunch, continue 10 units with dinner  If eating less carbohydrates or if pre meal blood sugar is tight/low, advised to take less mealtime insulin   Continue sliding scale insulin   - send blood sugar log for review in 1-2 weeks   -Follow-up in 6-8 weeks with repeat labs:  A1c, BMP, fasting lipid panel, as previously ordered anti islet antibody, david, C-peptide level  2  Hyperlipidemia  - continue statin therapy  Check fasting lipid panel    3  Hypertension  Blood pressure at goal  - continue ACE-I/ ARB    5  Obesity-diet and lifestyle as discussed  6  History of microalbuminuria-continue losartan  Check urine microalbumin  7  Diarrhea  If diarrhea does not resolve, advised patient to hold metformin to see if symptoms return resume  If it does, trial of slowly titrating up metformin  If symptoms recur, will switch to extended release metformin    CC: Diabetes    History of Present Illness     HPI:  Karli Paniagua is a 63 y o  male presents for a follow-up visit regarding diabetes management  DM history:   Diagnosed in 8240  No complications of CAD/ CVA/CKD  Last Eye exam: retinal pictures 7/2019 - no DR     Current regimen:   Metformin 1 gm PO BID  Lantus 36 units subcutaneously at bedtime  Humalog 10 units with meals 3 times a day  Sliding scale insulin    Appetite is good    Gained a few lb  Denies numbness or tingling in the hands or feet  Denies changes in vision  No known retinopathy  No chest pain/ SOB  Recently with diarrhea, multiple family members had it as well however has continued this week for the patient  No other complaints    No urinary complaints  Exercise:  None but very active at work   at [employer]  Home glucose monitorin to 4 times a day  Log will be scanned into chart  Ranging  mg/dL  Breakfast is usually the mellitus meal of the day, occasionally skips lunch when he is at work  Does not take insulin if he is skipping a meal     Symptoms of hypoglycemia :  Dizziness     All other systems were reviewed and are negative      Review of Systems      Historical Information   Past Medical History:   Diagnosis Date    Diabetes mellitus ([address] )      Past Surgical History:   Procedure Laterality Date    HERNIA REPAIR       Social History   Social History     Substance and Sexual Activity   Alcohol Use Yes    Frequency: 2-4 times a month    Drinks per session: 1 or 2    Comment: ocassional     Social History     Substance and Sexual Activity   Drug Use Never     Social History     Tobacco Use   Smoking Status Never Smoker   Smokeless Tobacco Never Used     Family History:   Family History   Problem Relation Age of Onset    No Known Problems Mother     Cancer Father        Meds/Allergies   Current Outpatient Medications   Medication Sig Dispense Refill    atorvastatin (LIPITOR) 10 mg tablet Take 1 tablet (10 mg total) by mouth daily 30 tablet 5    CONTOUR NEXT TEST test strip USE DAILY AS DIRECTED 50 each 3    insulin aspart (NOVOLOG FLEXPEN) 100 Units/mL injection pen Inject 10 Units under the skin 3 (three) times a day with meals 15 pen 3    insulin glargine (LANTUS SOLOSTAR) 100 units/mL injection pen Inject 36 Units under the skin daily 15 pen 3    Insulin Pen Needle (B-D UF III MINI PEN NEEDLES) 31G X 5 MM MISC by Does not apply route 4 (four) times a day 400 each 3    losartan (COZAAR) 25 mg tablet Take 1 tablet (25 mg total) by mouth daily 30 tablet 5    metFORMIN (GLUCOPHAGE) 1000 MG tablet Take 1 tablet (1,000 mg total) by mouth 2 (two) times a day with meals 60 tablet 5     No current facility-administered medications for this visit  Allergies   Allergen Reactions    Jardiance [Empagliflozin]      Ketoacidosis       Objective   Vitals: Blood pressure 126/80, pulse 69, height 6' 3" (1 905 m), weight 126 kg (278 lb 11 2 oz)  Physical Exam   Constitutional: He is oriented to person, place, and time  He appears well-developed and well-nourished  No distress  HENT:   Head: Normocephalic and atraumatic  Eyes: Pupils are equal, round, and reactive to light  Conjunctivae are normal    Neck: Normal range of motion  Neck supple  Cardiovascular: Normal rate, regular rhythm and normal heart sounds  No murmur heard  Pulmonary/Chest: Effort normal and breath sounds normal  No respiratory distress  He has no wheezes  Abdominal: Soft  He exhibits no distension  There is no tenderness  There is no guarding  Musculoskeletal: He exhibits no edema  Neurological: He is alert and oriented to person, place, and time  Skin: Skin is warm and dry  No rash noted  He is not diaphoretic  No erythema  Psychiatric: He has a normal mood and affect  His behavior is normal  Thought content normal    Vitals reviewed  The history was obtained from the review of the chart, patient and family      Lab Results:   Lab Results   Component Value Date/Time    Hemoglobin A1C 10 1 (H) 10/24/2019 07:16 AM    Hemoglobin A1C 7 3 (H) 05/15/2019 07:50 AM    BUN 18 10/24/2019 07:16 AM    BUN 15 05/15/2019 07:50 AM    Potassium 4 1 10/24/2019 07:16 AM    Potassium 4 4 05/15/2019 07:50 AM    Chloride 106 10/24/2019 07:16 AM    Chloride 105 05/15/2019 07:50 AM    CO2 27 10/24/2019 07:16 AM    CO2 26 05/15/2019 07:50 AM    Creatinine 0 96 10/24/2019 07:16 AM    Creatinine 1 08 05/15/2019 07:50 AM    AST 5 10/24/2019 07:16 AM    AST 19 05/15/2019 07:50 AM    ALT 22 10/24/2019 07:16 AM    ALT 38 05/15/2019 07:50 AM    Albumin 4 0 10/24/2019 07:16 AM    Albumin 3 6 05/15/2019 07:50 AM    HDL, Direct 43 10/24/2019 07:16 AM    HDL, Direct 50 05/15/2019 07:50 AM    Triglycerides 200 (H) 10/24/2019 07:16 AM    Triglycerides 112 05/15/2019 07:50 AM         Imaging Studies: I have personally reviewed pertinent reports  Portions of the record may have been created with voice recognition software  Occasional wrong word or "sound a like" substitutions may have occurred due to the inherent limitations of voice recognition software  Read the chart carefully and recognize, using context, where substitutions have occurred

## 2019-12-31 NOTE — PATIENT INSTRUCTIONS
Continue metformin 1000 mg orally twice a day  Continue Lantus 36 units subcutaneously at bedtime   Decrease Humalog to 8 units with breakfast, lunch, continue 10 units with dinner  If you are eating less carbohydrates Or if your pre meal blood sugar is tight/low, please take less mealtime insulin   Continue sliding scale insulin    if you have further low blood sugars, please let me know so that we can make appropriate adjustments to your insulin regimen   Please send blood sugar log for review in 1-2 weeks     Follow-up in 6-8 weeks with repeat labs

## 2020-02-08 ENCOUNTER — APPOINTMENT (OUTPATIENT)
Dept: LAB | Facility: MEDICAL CENTER | Age: 60
End: 2020-02-08
Payer: COMMERCIAL

## 2020-02-08 DIAGNOSIS — G89.29 CHRONIC PAIN OF RIGHT THUMB: ICD-10-CM

## 2020-02-08 DIAGNOSIS — E11.65 TYPE 2 DIABETES MELLITUS WITH HYPERGLYCEMIA, WITH LONG-TERM CURRENT USE OF INSULIN (HCC): ICD-10-CM

## 2020-02-08 DIAGNOSIS — Z79.4 TYPE 2 DIABETES MELLITUS WITH HYPERGLYCEMIA, WITH LONG-TERM CURRENT USE OF INSULIN (HCC): ICD-10-CM

## 2020-02-08 DIAGNOSIS — E78.2 MIXED HYPERLIPIDEMIA: ICD-10-CM

## 2020-02-08 DIAGNOSIS — R80.9 MICROALBUMINURIA: ICD-10-CM

## 2020-02-08 DIAGNOSIS — M79.644 CHRONIC PAIN OF RIGHT THUMB: ICD-10-CM

## 2020-02-08 LAB
ANION GAP SERPL CALCULATED.3IONS-SCNC: 6 MMOL/L (ref 4–13)
BUN SERPL-MCNC: 21 MG/DL (ref 5–25)
CALCIUM SERPL-MCNC: 9.1 MG/DL (ref 8.3–10.1)
CHLORIDE SERPL-SCNC: 110 MMOL/L (ref 100–108)
CHOLEST SERPL-MCNC: 151 MG/DL (ref 50–200)
CO2 SERPL-SCNC: 25 MMOL/L (ref 21–32)
CREAT SERPL-MCNC: 0.91 MG/DL (ref 0.6–1.3)
CREAT UR-MCNC: 153 MG/DL
EST. AVERAGE GLUCOSE BLD GHB EST-MCNC: 151 MG/DL
GFR SERPL CREATININE-BSD FRML MDRD: 93 ML/MIN/1.73SQ M
GLUCOSE P FAST SERPL-MCNC: 110 MG/DL (ref 65–99)
HBA1C MFR BLD: 6.9 % (ref 4.2–6.3)
HDLC SERPL-MCNC: 49 MG/DL
LDLC SERPL CALC-MCNC: 85 MG/DL (ref 0–100)
MICROALBUMIN UR-MCNC: 21.9 MG/L (ref 0–20)
MICROALBUMIN/CREAT 24H UR: 14 MG/G CREATININE (ref 0–30)
NONHDLC SERPL-MCNC: 102 MG/DL
POTASSIUM SERPL-SCNC: 4.4 MMOL/L (ref 3.5–5.3)
SODIUM SERPL-SCNC: 141 MMOL/L (ref 136–145)
TRIGL SERPL-MCNC: 85 MG/DL

## 2020-02-08 PROCEDURE — 80048 BASIC METABOLIC PNL TOTAL CA: CPT

## 2020-02-08 PROCEDURE — 82570 ASSAY OF URINE CREATININE: CPT | Performed by: INTERNAL MEDICINE

## 2020-02-08 PROCEDURE — 80061 LIPID PANEL: CPT

## 2020-02-08 PROCEDURE — 3066F NEPHROPATHY DOC TX: CPT | Performed by: INTERNAL MEDICINE

## 2020-02-08 PROCEDURE — 83036 HEMOGLOBIN GLYCOSYLATED A1C: CPT

## 2020-02-08 PROCEDURE — 3044F HG A1C LEVEL LT 7.0%: CPT | Performed by: INTERNAL MEDICINE

## 2020-02-08 PROCEDURE — 86341 ISLET CELL ANTIBODY: CPT

## 2020-02-08 PROCEDURE — 83519 RIA NONANTIBODY: CPT

## 2020-02-08 PROCEDURE — 82043 UR ALBUMIN QUANTITATIVE: CPT | Performed by: INTERNAL MEDICINE

## 2020-02-08 PROCEDURE — 36415 COLL VENOUS BLD VENIPUNCTURE: CPT

## 2020-02-08 PROCEDURE — 3061F NEG MICROALBUMINURIA REV: CPT | Performed by: INTERNAL MEDICINE

## 2020-02-11 LAB — PANC ISLET CELL AB TITR SER: NEGATIVE {TITER}

## 2020-02-12 LAB — GAD65 AB SER-ACNC: <5 U/ML (ref 0–5)

## 2020-02-17 ENCOUNTER — OFFICE VISIT (OUTPATIENT)
Dept: ENDOCRINOLOGY | Facility: CLINIC | Age: 60
End: 2020-02-17
Payer: COMMERCIAL

## 2020-02-17 VITALS
HEART RATE: 88 BPM | DIASTOLIC BLOOD PRESSURE: 82 MMHG | HEIGHT: 75 IN | WEIGHT: 280.6 LBS | SYSTOLIC BLOOD PRESSURE: 132 MMHG | BODY MASS INDEX: 34.89 KG/M2

## 2020-02-17 DIAGNOSIS — E78.2 MIXED HYPERLIPIDEMIA: ICD-10-CM

## 2020-02-17 DIAGNOSIS — Z79.4 TYPE 2 DIABETES MELLITUS WITH HYPOGLYCEMIA WITHOUT COMA, WITH LONG-TERM CURRENT USE OF INSULIN (HCC): Primary | ICD-10-CM

## 2020-02-17 DIAGNOSIS — E66.9 OBESITY (BMI 30-39.9): ICD-10-CM

## 2020-02-17 DIAGNOSIS — E11.649 TYPE 2 DIABETES MELLITUS WITH HYPOGLYCEMIA WITHOUT COMA, WITH LONG-TERM CURRENT USE OF INSULIN (HCC): Primary | ICD-10-CM

## 2020-02-17 DIAGNOSIS — R19.7 DIARRHEA, UNSPECIFIED TYPE: ICD-10-CM

## 2020-02-17 DIAGNOSIS — R80.9 MICROALBUMINURIA: ICD-10-CM

## 2020-02-17 PROCEDURE — 3008F BODY MASS INDEX DOCD: CPT | Performed by: INTERNAL MEDICINE

## 2020-02-17 PROCEDURE — 1036F TOBACCO NON-USER: CPT | Performed by: INTERNAL MEDICINE

## 2020-02-17 PROCEDURE — 2022F DILAT RTA XM EVC RTNOPTHY: CPT | Performed by: INTERNAL MEDICINE

## 2020-02-17 PROCEDURE — 3060F POS MICROALBUMINURIA REV: CPT | Performed by: INTERNAL MEDICINE

## 2020-02-17 PROCEDURE — 99214 OFFICE O/P EST MOD 30 MIN: CPT | Performed by: INTERNAL MEDICINE

## 2020-02-17 PROCEDURE — 3044F HG A1C LEVEL LT 7.0%: CPT | Performed by: INTERNAL MEDICINE

## 2020-02-17 PROCEDURE — 3066F NEPHROPATHY DOC TX: CPT | Performed by: INTERNAL MEDICINE

## 2020-02-17 RX ORDER — METFORMIN HYDROCHLORIDE 500 MG/1
1000 TABLET, EXTENDED RELEASE ORAL 2 TIMES DAILY WITH MEALS
Qty: 360 TABLET | Refills: 3 | Status: SHIPPED | OUTPATIENT
Start: 2020-02-17 | End: 2021-01-25

## 2020-02-17 NOTE — PATIENT INSTRUCTIONS
Continue metformin however will switch to extended release form  Start with 500 mg orally twice a day, if you have no gastric side effects, increase to 3 tablets daily after 3-4 days and then to 4 tablets taken as a 1000 mg ( 2 tablets) orally twice a day     decrease Lantus to 34 units subcutaneously at bedtime    If you continue to have tight/ low blood sugars, please decreased further to 32 units   if with this change, you notice that your other blood sugars are trending up, please let me know and we can start either Humalog at a lower dose with your meals or consider other medications     follow-up in 3 months with repeat labs

## 2020-02-17 NOTE — PROGRESS NOTES
Amaury Barriga 63 y o  male MRN: [id number]    Encounter: [id number]      Assessment/Plan     Assessment: This is a 63y o -year-old male with type 2 diabetes mellitus, hyperlipidemia, obesity, microalbuminuria, diarrhea    Plan:  1  Type 2 diabetes mellitus on long-term insulin therapy with hypoglycemia  Marked improvement in blood sugars, most recent A1c 6 9%  It is concerning the patient is still having episodes of hypoglycemia/tight blood sugars  Also patient has noted gastric side effects with regular metformin ( diarrhea)  Bhupendra, anti islet antibody negative    Recommend the following at this time  - will switch to metformin XR, titrate up as tolerated to 1000 mg orally twice a day  - decrease Lantus to 34 units subcutaneously at bedtime  Advised patient to decrease further if he continues to have tight blood sugars  Send log for review in 2-3 weeks    -check C-peptide level; if not low, can consider other non insulin medications for glycemic management  - repeat BMP, A1c in 3 months    2  Hyperlipidemia  Improved  - continue statin therapy  Repeat lipid panel in 6 months    3  Hypertension  Blood pressure at goal  - continue ACE-I/ ARB    4  Microalbuminuria - recent levels within normal limits  Continue losartan at current dose  Repeat urine microalbumin levels in 6 months    5  Obesity-diet and lifestyle as discussed    CC: Diabetes    History of Present Illness     HPI:  Amaury Barriga is a 63 y o  male presents for a follow-up visit regarding diabetes management        DM history:   Diagnosed in 8621  No known complications of CAD/ CVA/CKD  Last Eye exam: 7/2019  No DR     Current regimen:   Metformin 1000 mg orally twice a day  Lantus 36 units subcutaneously at bedtime    Initially reduced and that stopped taking short-acting insulin after he noticed lower blood sugars  Feels like he has not changed how he is eating as compared to before however on recall of diet, overall eating habits, in reality patient is consuming a lower carbohydrate meal as compared to before  He is more conscious of the foods he is choosing example substituting Western Vivian fries with lower carbohydrate containing foods, not eating ice cream anymore on a regular basis, does not add sugar to coffee    B: eggs/sausage/ toast, sometimes berreies   Lunch:  [place]   Supper - largest meal of the day: meatballs/ sandwich   Snacks - bananas, oranges, popcorn, cereal     Home glucose monitoring:  Log will be scanned into chart 3-4 times a day  Range  (blood sugars > 200 are not frequent)   Symptoms of hypoglycemia :  Usually when blood sugars are in the 6 Lightheadedness,  Maybe skipped lunch     Statin:  Lipitor  ACE-I/ARB:  Losartan    Appetite is good  Denies recent weight gain/ loss  Denies numbness or tingling in the hands or feet  Denies changes in vision  No known retinopathy  No chest pain/ SOB  gets some diarrhea with metformin, when he takes a lower dose, does not have any gastric symptoms  No urinary complaints  Exercise:  None however active at work    All other systems were reviewed and are negative      Review of Systems      Historical Information   Past Medical History:   Diagnosis Date    Diabetes mellitus ([facility] [address] )      Past Surgical History:   Procedure Laterality Date    HERNIA REPAIR       Social History   Social History     Substance and Sexual Activity   Alcohol Use Yes    Frequency: 2-4 times a month    Drinks per session: 1 or 2    Comment: ocassional     Social History     Substance and Sexual Activity   Drug Use Never     Social History     Tobacco Use   Smoking Status Never Smoker   Smokeless Tobacco Never Used     Family History:   Family History   Problem Relation Age of Onset    No Known Problems Mother     Cancer Father        Meds/Allergies   Current Outpatient Medications   Medication Sig Dispense Refill    atorvastatin (LIPITOR) 10 mg tablet Take 1 tablet (10 mg total) by mouth daily 30 tablet 5    CONTOUR NEXT TEST test strip USE DAILY AS DIRECTED (Patient taking differently: No sig reported) 50 each 3    insulin glargine (LANTUS SOLOSTAR) 100 units/mL injection pen Inject 36 Units under the skin daily 15 pen 3    Insulin Pen Needle (B-D UF III MINI PEN NEEDLES) 31G X 5 MM MISC by Does not apply route 4 (four) times a day 400 each 3    losartan (COZAAR) 25 mg tablet Take 1 tablet (25 mg total) by mouth daily 30 tablet 5    metFORMIN (GLUCOPHAGE) 1000 MG tablet Take 1 tablet (1,000 mg total) by mouth 2 (two) times a day with meals 60 tablet 5    insulin aspart (NOVOLOG FLEXPEN) 100 Units/mL injection pen Inject 10 Units under the skin 3 (three) times a day with meals (Patient not taking: Reported on 2/17/2020) 15 pen 3     No current facility-administered medications for this visit  Allergies   Allergen Reactions    Jardiance [Empagliflozin]      Ketoacidosis       Objective   Vitals: Blood pressure 132/82, pulse 88, height 6' 3" (1 905 m), weight 127 kg (280 lb 9 6 oz)  Physical Exam   Constitutional: He is oriented to person, place, and time  He appears well-developed and well-nourished  No distress  HENT:   Head: Normocephalic and atraumatic  Eyes: Pupils are equal, round, and reactive to light  Conjunctivae are normal    Neck: Normal range of motion  Neck supple  Cardiovascular: Normal rate, regular rhythm and normal heart sounds  No murmur heard  Pulmonary/Chest: Effort normal and breath sounds normal  No respiratory distress  He has no wheezes  Abdominal: Soft  He exhibits no distension  There is no tenderness  There is no guarding  Musculoskeletal: He exhibits no edema  Neurological: He is alert and oriented to person, place, and time  Skin: Skin is warm and dry  No rash noted  He is not diaphoretic  No erythema  Psychiatric: He has a normal mood and affect  His behavior is normal  Thought content normal    Vitals reviewed        The history was obtained from the review of the chart, patient and family  Lab Results:   Lab Results   Component Value Date/Time    Hemoglobin A1C 6 9 (H) 02/08/2020 07:52 AM    Hemoglobin A1C 10 1 (H) 10/24/2019 07:16 AM    Hemoglobin A1C 7 3 (H) 05/15/2019 07:50 AM    BUN 21 02/08/2020 07:52 AM    BUN 18 10/24/2019 07:16 AM    BUN 15 05/15/2019 07:50 AM    Potassium 4 4 02/08/2020 07:52 AM    Potassium 4 1 10/24/2019 07:16 AM    Potassium 4 4 05/15/2019 07:50 AM    Chloride 110 (H) 02/08/2020 07:52 AM    Chloride 106 10/24/2019 07:16 AM    Chloride 105 05/15/2019 07:50 AM    CO2 25 02/08/2020 07:52 AM    CO2 27 10/24/2019 07:16 AM    CO2 26 05/15/2019 07:50 AM    Creatinine 0 91 02/08/2020 07:52 AM    Creatinine 0 96 10/24/2019 07:16 AM    Creatinine 1 08 05/15/2019 07:50 AM    AST 5 10/24/2019 07:16 AM    AST 19 05/15/2019 07:50 AM    ALT 22 10/24/2019 07:16 AM    ALT 38 05/15/2019 07:50 AM    Albumin 4 0 10/24/2019 07:16 AM    Albumin 3 6 05/15/2019 07:50 AM    HDL, Direct 49 02/08/2020 07:52 AM    HDL, Direct 43 10/24/2019 07:16 AM    HDL, Direct 50 05/15/2019 07:50 AM    Triglycerides 85 02/08/2020 07:52 AM    Triglycerides 200 (H) 10/24/2019 07:16 AM    Triglycerides 112 05/15/2019 07:50 AM         Imaging Studies: I have personally reviewed pertinent reports  Portions of the record may have been created with voice recognition software  Occasional wrong word or "sound a like" substitutions may have occurred due to the inherent limitations of voice recognition software  Read the chart carefully and recognize, using context, where substitutions have occurred

## 2020-04-20 DIAGNOSIS — E11.65 TYPE 2 DIABETES MELLITUS WITH HYPERGLYCEMIA, WITHOUT LONG-TERM CURRENT USE OF INSULIN (HCC): ICD-10-CM

## 2020-04-20 DIAGNOSIS — E78.2 MIXED HYPERLIPIDEMIA: ICD-10-CM

## 2020-04-20 DIAGNOSIS — Z79.4 TYPE 2 DIABETES MELLITUS WITH HYPERGLYCEMIA, WITH LONG-TERM CURRENT USE OF INSULIN (HCC): ICD-10-CM

## 2020-04-20 DIAGNOSIS — R80.9 MICROALBUMINURIA: ICD-10-CM

## 2020-04-20 DIAGNOSIS — E11.65 TYPE 2 DIABETES MELLITUS WITH HYPERGLYCEMIA, WITH LONG-TERM CURRENT USE OF INSULIN (HCC): ICD-10-CM

## 2020-04-20 PROCEDURE — 4010F ACE/ARB THERAPY RXD/TAKEN: CPT | Performed by: INTERNAL MEDICINE

## 2020-04-20 RX ORDER — INSULIN GLARGINE 100 [IU]/ML
INJECTION, SOLUTION SUBCUTANEOUS
Qty: 12 ML | Refills: 0 | Status: SHIPPED | OUTPATIENT
Start: 2020-04-20 | End: 2020-06-05

## 2020-04-20 RX ORDER — ATORVASTATIN CALCIUM 10 MG/1
10 TABLET, FILM COATED ORAL DAILY
Qty: 30 TABLET | Refills: 5 | Status: SHIPPED | OUTPATIENT
Start: 2020-04-20 | End: 2020-11-04

## 2020-04-20 RX ORDER — LOSARTAN POTASSIUM 25 MG/1
25 TABLET ORAL DAILY
Qty: 30 TABLET | Refills: 5 | Status: SHIPPED | OUTPATIENT
Start: 2020-04-20 | End: 2020-11-04

## 2020-04-20 RX ORDER — INSULIN ASPART 100 [IU]/ML
INJECTION, SOLUTION INTRAVENOUS; SUBCUTANEOUS
Qty: 9 ML | Refills: 0 | Status: SHIPPED | OUTPATIENT
Start: 2020-04-20 | End: 2020-09-23

## 2020-06-05 DIAGNOSIS — E78.2 MIXED HYPERLIPIDEMIA: ICD-10-CM

## 2020-06-05 DIAGNOSIS — R80.9 MICROALBUMINURIA: ICD-10-CM

## 2020-06-05 RX ORDER — INSULIN GLARGINE 100 [IU]/ML
INJECTION, SOLUTION SUBCUTANEOUS
Qty: 15 ML | Refills: 0 | Status: SHIPPED | OUTPATIENT
Start: 2020-06-05 | End: 2020-07-14

## 2020-07-14 DIAGNOSIS — E78.2 MIXED HYPERLIPIDEMIA: ICD-10-CM

## 2020-07-14 DIAGNOSIS — R80.9 MICROALBUMINURIA: ICD-10-CM

## 2020-07-14 RX ORDER — INSULIN GLARGINE 100 [IU]/ML
INJECTION, SOLUTION SUBCUTANEOUS
Qty: 15 ML | Refills: 0 | Status: SHIPPED | OUTPATIENT
Start: 2020-07-14 | End: 2020-09-03

## 2020-08-25 DIAGNOSIS — Z79.4 TYPE 2 DIABETES MELLITUS WITH HYPERGLYCEMIA, WITH LONG-TERM CURRENT USE OF INSULIN (HCC): ICD-10-CM

## 2020-08-25 DIAGNOSIS — E11.65 TYPE 2 DIABETES MELLITUS WITH HYPERGLYCEMIA, WITH LONG-TERM CURRENT USE OF INSULIN (HCC): ICD-10-CM

## 2020-08-25 DIAGNOSIS — E78.2 MIXED HYPERLIPIDEMIA: Primary | ICD-10-CM

## 2020-08-25 DIAGNOSIS — R80.9 MICROALBUMINURIA: ICD-10-CM

## 2020-08-31 DIAGNOSIS — R80.9 MICROALBUMINURIA: ICD-10-CM

## 2020-08-31 DIAGNOSIS — E11.65 TYPE 2 DIABETES MELLITUS WITH HYPERGLYCEMIA, WITH LONG-TERM CURRENT USE OF INSULIN (HCC): Primary | ICD-10-CM

## 2020-08-31 DIAGNOSIS — E78.2 MIXED HYPERLIPIDEMIA: ICD-10-CM

## 2020-08-31 DIAGNOSIS — Z79.4 TYPE 2 DIABETES MELLITUS WITH HYPERGLYCEMIA, WITH LONG-TERM CURRENT USE OF INSULIN (HCC): Primary | ICD-10-CM

## 2020-09-03 DIAGNOSIS — Z79.4 TYPE 2 DIABETES MELLITUS WITH HYPERGLYCEMIA, WITH LONG-TERM CURRENT USE OF INSULIN (HCC): ICD-10-CM

## 2020-09-03 DIAGNOSIS — E11.65 TYPE 2 DIABETES MELLITUS WITH HYPERGLYCEMIA, WITH LONG-TERM CURRENT USE OF INSULIN (HCC): ICD-10-CM

## 2020-09-03 RX ORDER — INSULIN GLARGINE 100 [IU]/ML
INJECTION, SOLUTION SUBCUTANEOUS
Qty: 15 ML | Refills: 1 | Status: SHIPPED | OUTPATIENT
Start: 2020-09-03 | End: 2020-10-21 | Stop reason: DRUGHIGH

## 2020-09-03 RX ORDER — INSULIN GLARGINE 100 [IU]/ML
INJECTION, SOLUTION SUBCUTANEOUS
Qty: 30 ML | Refills: 1 | Status: SHIPPED | OUTPATIENT
Start: 2020-09-03 | End: 2020-09-03 | Stop reason: SDUPTHER

## 2020-09-16 ENCOUNTER — APPOINTMENT (OUTPATIENT)
Dept: LAB | Facility: MEDICAL CENTER | Age: 60
End: 2020-09-16
Payer: COMMERCIAL

## 2020-09-16 DIAGNOSIS — Z79.4 TYPE 2 DIABETES MELLITUS WITH HYPERGLYCEMIA, WITH LONG-TERM CURRENT USE OF INSULIN (HCC): ICD-10-CM

## 2020-09-16 DIAGNOSIS — E11.65 TYPE 2 DIABETES MELLITUS WITH HYPERGLYCEMIA, WITH LONG-TERM CURRENT USE OF INSULIN (HCC): ICD-10-CM

## 2020-09-16 DIAGNOSIS — E78.2 MIXED HYPERLIPIDEMIA: ICD-10-CM

## 2020-09-16 DIAGNOSIS — R80.9 MICROALBUMINURIA: ICD-10-CM

## 2020-09-16 LAB
ANION GAP SERPL CALCULATED.3IONS-SCNC: 6 MMOL/L (ref 4–13)
BUN SERPL-MCNC: 16 MG/DL (ref 5–25)
CALCIUM SERPL-MCNC: 9.6 MG/DL (ref 8.3–10.1)
CHLORIDE SERPL-SCNC: 109 MMOL/L (ref 100–108)
CO2 SERPL-SCNC: 26 MMOL/L (ref 21–32)
CREAT SERPL-MCNC: 0.96 MG/DL (ref 0.6–1.3)
GFR SERPL CREATININE-BSD FRML MDRD: 86 ML/MIN/1.73SQ M
GLUCOSE P FAST SERPL-MCNC: 173 MG/DL (ref 65–99)
POTASSIUM SERPL-SCNC: 4.6 MMOL/L (ref 3.5–5.3)
SODIUM SERPL-SCNC: 141 MMOL/L (ref 136–145)

## 2020-09-16 PROCEDURE — 3066F NEPHROPATHY DOC TX: CPT | Performed by: INTERNAL MEDICINE

## 2020-09-16 PROCEDURE — 84681 ASSAY OF C-PEPTIDE: CPT

## 2020-09-16 PROCEDURE — 80048 BASIC METABOLIC PNL TOTAL CA: CPT

## 2020-09-16 PROCEDURE — 36415 COLL VENOUS BLD VENIPUNCTURE: CPT

## 2020-09-16 PROCEDURE — 83036 HEMOGLOBIN GLYCOSYLATED A1C: CPT

## 2020-09-17 LAB
C PEPTIDE SERPL-MCNC: 3 NG/ML (ref 1.1–4.4)
EST. AVERAGE GLUCOSE BLD GHB EST-MCNC: 148 MG/DL
HBA1C MFR BLD: 6.8 %

## 2020-09-17 PROCEDURE — 3044F HG A1C LEVEL LT 7.0%: CPT | Performed by: INTERNAL MEDICINE

## 2020-09-23 ENCOUNTER — OFFICE VISIT (OUTPATIENT)
Dept: ENDOCRINOLOGY | Facility: CLINIC | Age: 60
End: 2020-09-23
Payer: COMMERCIAL

## 2020-09-23 VITALS
BODY MASS INDEX: 35.13 KG/M2 | TEMPERATURE: 98.3 F | SYSTOLIC BLOOD PRESSURE: 130 MMHG | DIASTOLIC BLOOD PRESSURE: 88 MMHG | HEIGHT: 75 IN | WEIGHT: 282.5 LBS | HEART RATE: 63 BPM

## 2020-09-23 DIAGNOSIS — Z79.4 TYPE 2 DIABETES MELLITUS WITH HYPERGLYCEMIA, WITH LONG-TERM CURRENT USE OF INSULIN (HCC): Primary | ICD-10-CM

## 2020-09-23 DIAGNOSIS — E78.2 MIXED HYPERLIPIDEMIA: ICD-10-CM

## 2020-09-23 DIAGNOSIS — I10 HYPERTENSION GOAL BP (BLOOD PRESSURE) < 140/90: ICD-10-CM

## 2020-09-23 DIAGNOSIS — E11.65 TYPE 2 DIABETES MELLITUS WITH HYPERGLYCEMIA, WITH LONG-TERM CURRENT USE OF INSULIN (HCC): Primary | ICD-10-CM

## 2020-09-23 PROCEDURE — 99214 OFFICE O/P EST MOD 30 MIN: CPT | Performed by: INTERNAL MEDICINE

## 2020-09-23 PROCEDURE — 3079F DIAST BP 80-89 MM HG: CPT | Performed by: INTERNAL MEDICINE

## 2020-09-23 PROCEDURE — 1036F TOBACCO NON-USER: CPT | Performed by: INTERNAL MEDICINE

## 2020-09-23 RX ORDER — LINAGLIPTIN 5 MG/1
5 TABLET, FILM COATED ORAL DAILY
Qty: 90 TABLET | Refills: 1 | Status: SHIPPED | OUTPATIENT
Start: 2020-09-23 | End: 2021-04-08

## 2020-09-23 NOTE — PROGRESS NOTES
ENDOCRINOLOGY  FOLLOW UP VISIT      Reason for Endocrine Consult/Chief Complaint: DM management        ? Medical Decision Making:     Impression  1  Type 2 Diabetes  2  HTN  3  HLD       Recommendations:    BGs are stable, will reduce lantus to 28 units qHS and start tradjenta 5mg qday  Counseled on low risk of pancreatitis while on tradjenta  Continue metformin 1000mg XR BID AC- instructed to check with pharmacy whether his batch was recalled  Instructed to send me BG log in 2-3 weeks for further titration off basal insulin     HTN- controlled continue ARB    HLD- LDL 85, triglycerides 85 Feb 2020, continue statin     RTC 4 months     Hermes COLLINS  Endocrinology        History of Present Illness:   Mr Yesenia Hitchcock is a 62year old male who presents for DM follow up  Last saw Dr Gustavo Haas in Feb 2020 for diabetes  At that time he was switched to metformin XR 1000mg BID AC and lantus decreased to 34 units qHS  Also has HTN, HLD  ? Events since last visit:     On lantus 32 units qHS, metformin XR 1000mg BID AC    FBG-low to mid 100s  Premeal/qHS BG-low to mid 100s  Hypoglycemia- none       Was on jardiance but had DKA   ? Review of Systems:     Review of Systems   Constitutional: Negative for appetite change, chills, diaphoresis, fatigue, fever and unexpected weight change  HENT: Negative for congestion, ear pain, hearing loss, rhinorrhea, sinus pressure, sinus pain, sore throat, trouble swallowing and voice change  Eyes: Negative for photophobia, redness and visual disturbance  Respiratory: Negative for apnea, cough, chest tightness, shortness of breath, wheezing and stridor  Cardiovascular: Negative for chest pain, palpitations and leg swelling  Gastrointestinal: Negative for abdominal distention, abdominal pain, constipation, diarrhea, nausea and vomiting  Endocrine: Negative for cold intolerance, heat intolerance, polydipsia, polyphagia and polyuria     Genitourinary: Negative for difficulty urinating, dysuria, flank pain, frequency, hematuria and urgency  Musculoskeletal: Negative for arthralgias, back pain, gait problem, joint swelling and myalgias  Skin: Negative for color change, pallor, rash and wound  Allergic/Immunologic: Negative for immunocompromised state  Neurological: Negative for dizziness, tremors, syncope, weakness, light-headedness and headaches  Hematological: Negative for adenopathy  Does not bruise/bleed easily  Psychiatric/Behavioral: Negative for confusion and sleep disturbance  The patient is not nervous/anxious  ?   Patient History:     Past Medical History:   Diagnosis Date    Diabetes mellitus ([facility] [address] )      Past Surgical History:   Procedure Laterality Date    HERNIA REPAIR       Social History     Socioeconomic History    Marital status: Single     Spouse name: Not on file    Number of children: Not on file    Years of education: Not on file    Highest education level: Not on file   Occupational History    Not on file   Social Needs    Financial resource strain: Not on file    Food insecurity     Worry: Not on file     Inability: Not on file    Transportation needs     Medical: Not on file     Non-medical: Not on file   Tobacco Use    Smoking status: Never Smoker    Smokeless tobacco: Never Used   Substance and Sexual Activity    Alcohol use: Yes     Frequency: 2-4 times a month     Drinks per session: 1 or 2     Comment: ocassional    Drug use: Never    Sexual activity: Not on file   Lifestyle    Physical activity     Days per week: Not on file     Minutes per session: Not on file    Stress: Not on file   Relationships    Social connections     Talks on phone: Not on file     Gets together: Not on file     Attends Orthodox service: Not on file     Active member of club or organization: Not on file     Attends meetings of clubs or organizations: Not on file     Relationship status: Not on file    Intimate partner violence     Fear of current or ex partner: Not on file     Emotionally abused: Not on file     Physically abused: Not on file     Forced sexual activity: Not on file   Other Topics Concern    Not on file   Social History Narrative    Not on file     Family History   Problem Relation Age of Onset    No Known Problems Mother     Cancer Father        Current Medications: At the time this note was written these were the medications the patient was on  Current Outpatient Medications   Medication Sig Dispense Refill    CONTOUR NEXT TEST test strip USE DAILY AS DIRECTED (Patient taking differently: No sig reported) 50 each 3    insulin glargine (Lantus SoloStar) 100 units/mL injection pen Inject 34 units under the skin at bedtime (Patient taking differently: Inject 32 units under the skin at bedtime) 15 mL 1    Insulin Pen Needle (B-D UF III MINI PEN NEEDLES) 31G X 5 MM MISC by Does not apply route 4 (four) times a day 400 each 3    losartan (COZAAR) 25 mg tablet Take 1 tablet (25 mg total) by mouth daily 30 tablet 5    metFORMIN (GLUCOPHAGE-XR) 500 mg 24 hr tablet Take 2 tablets (1,000 mg total) by mouth 2 (two) times a day with meals 360 tablet 3    atorvastatin (LIPITOR) 10 mg tablet Take 1 tablet (10 mg total) by mouth daily 30 tablet 5     No current facility-administered medications for this visit  Allergies: Jardiance [empagliflozin]    Physical Exam:   Vital Signs:   /88   Pulse 63   Temp 98 3 °F (36 8 °C)   Ht 6' 3" (1 905 m)   Wt 128 kg (282 lb 8 oz)   BMI 35 31 kg/m²     Physical Exam  Vitals signs reviewed  Constitutional:       General: He is not in acute distress  Appearance: Normal appearance  He is not ill-appearing, toxic-appearing or diaphoretic  HENT:      Head: Normocephalic and atraumatic  Right Ear: External ear normal       Left Ear: External ear normal       Nose: Nose normal    Eyes:      General: No scleral icterus  Extraocular Movements: Extraocular movements intact  Conjunctiva/sclera: Conjunctivae normal    Neck:      Musculoskeletal: Normal range of motion and neck supple  No muscular tenderness  Cardiovascular:      Rate and Rhythm: Normal rate and regular rhythm  Heart sounds: No murmur  No friction rub  No gallop  Pulmonary:      Effort: Pulmonary effort is normal  No respiratory distress  Breath sounds: Normal breath sounds  No stridor  No wheezing, rhonchi or rales  Abdominal:      General: Bowel sounds are normal  There is no distension  Palpations: Abdomen is soft  There is no mass  Tenderness: There is no abdominal tenderness  There is no guarding or rebound  Hernia: No hernia is present  Musculoskeletal: Normal range of motion  General: No swelling  Lymphadenopathy:      Cervical: No cervical adenopathy  Skin:     General: Skin is warm and dry  Coloration: Skin is not pale  Findings: No erythema or rash  Neurological:      General: No focal deficit present  Mental Status: He is alert and oriented to person, place, and time  Psychiatric:         Mood and Affect: Mood normal          Behavior: Behavior normal          Thought Content: Thought content normal          Judgment: Judgment normal             Labs and Imaging:      Component      Latest Ref Rng & Units 9/16/2020   Sodium      136 - 145 mmol/L 141   Potassium      3 5 - 5 3 mmol/L 4 6   Chloride      100 - 108 mmol/L 109 (H)   CO2      21 - 32 mmol/L 26   Anion Gap      4 - 13 mmol/L 6   BUN      5 - 25 mg/dL 16   Creatinine      0 60 - 1 30 mg/dL 0 96   GLUCOSE FASTING      65 - 99 mg/dL 173 (H)   Calcium      8 3 - 10 1 mg/dL 9 6   eGFR      ml/min/1 73sq m 86   Hemoglobin A1C      Normal 3 8-5 6%; PreDiabetic 5 7-6 4%;  Diabetic >=6 5%; Glycemic control for adults with diabetes <7 0% % 6 8 (H)   eAG, EST AVG Glucose      mg/dl 148   C-PEPTIDE      1 1 - 4 4 ng/mL 3 0

## 2020-09-23 NOTE — PATIENT INSTRUCTIONS
Reduce lantus to 28 units at bedtime    Start Tradjenta 5mg once a day    Continue metformin- check with pharmacy about the recall    Have labs done in 3 months    Follow up in 4 months

## 2020-10-20 DIAGNOSIS — E11.65 TYPE 2 DIABETES MELLITUS WITH HYPERGLYCEMIA, WITH LONG-TERM CURRENT USE OF INSULIN (HCC): ICD-10-CM

## 2020-10-20 DIAGNOSIS — Z79.4 TYPE 2 DIABETES MELLITUS WITH HYPERGLYCEMIA, WITH LONG-TERM CURRENT USE OF INSULIN (HCC): ICD-10-CM

## 2020-10-21 RX ORDER — INSULIN GLARGINE 100 [IU]/ML
INJECTION, SOLUTION SUBCUTANEOUS
Qty: 15 ML | Refills: 1
Start: 2020-10-21 | End: 2021-06-28 | Stop reason: SDUPTHER

## 2020-11-04 DIAGNOSIS — E11.65 TYPE 2 DIABETES MELLITUS WITH HYPERGLYCEMIA, WITHOUT LONG-TERM CURRENT USE OF INSULIN (HCC): ICD-10-CM

## 2020-11-04 RX ORDER — ATORVASTATIN CALCIUM 10 MG/1
10 TABLET, FILM COATED ORAL DAILY
Qty: 30 TABLET | Refills: 0 | Status: SHIPPED | OUTPATIENT
Start: 2020-11-04 | End: 2020-12-10

## 2020-11-04 RX ORDER — PEN NEEDLE, DIABETIC 31 GX5/16"
NEEDLE, DISPOSABLE MISCELLANEOUS
Qty: 30 EACH | Refills: 0 | Status: SHIPPED | OUTPATIENT
Start: 2020-11-04

## 2020-11-04 RX ORDER — LOSARTAN POTASSIUM 25 MG/1
25 TABLET ORAL DAILY
Qty: 30 TABLET | Refills: 0 | Status: SHIPPED | OUTPATIENT
Start: 2020-11-04 | End: 2020-12-10

## 2020-12-10 DIAGNOSIS — E11.65 TYPE 2 DIABETES MELLITUS WITH HYPERGLYCEMIA, WITHOUT LONG-TERM CURRENT USE OF INSULIN (HCC): ICD-10-CM

## 2020-12-10 RX ORDER — LOSARTAN POTASSIUM 25 MG/1
25 TABLET ORAL DAILY
Qty: 30 TABLET | Refills: 0 | Status: SHIPPED | OUTPATIENT
Start: 2020-12-10 | End: 2021-01-12

## 2020-12-10 RX ORDER — ATORVASTATIN CALCIUM 10 MG/1
10 TABLET, FILM COATED ORAL DAILY
Qty: 30 TABLET | Refills: 0 | Status: SHIPPED | OUTPATIENT
Start: 2020-12-10 | End: 2021-01-12

## 2021-01-12 DIAGNOSIS — E11.65 TYPE 2 DIABETES MELLITUS WITH HYPERGLYCEMIA, WITHOUT LONG-TERM CURRENT USE OF INSULIN (HCC): ICD-10-CM

## 2021-01-12 PROCEDURE — 4010F ACE/ARB THERAPY RXD/TAKEN: CPT | Performed by: INTERNAL MEDICINE

## 2021-01-12 RX ORDER — LOSARTAN POTASSIUM 25 MG/1
TABLET ORAL
Qty: 30 TABLET | Refills: 0 | Status: SHIPPED | OUTPATIENT
Start: 2021-01-12 | End: 2021-06-28 | Stop reason: SDUPTHER

## 2021-01-12 RX ORDER — ATORVASTATIN CALCIUM 10 MG/1
TABLET, FILM COATED ORAL
Qty: 30 TABLET | Refills: 0 | Status: SHIPPED | OUTPATIENT
Start: 2021-01-12 | End: 2021-06-28 | Stop reason: SDUPTHER

## 2021-01-21 ENCOUNTER — LAB (OUTPATIENT)
Dept: LAB | Facility: MEDICAL CENTER | Age: 61
End: 2021-01-21
Payer: COMMERCIAL

## 2021-01-21 DIAGNOSIS — I10 HYPERTENSION GOAL BP (BLOOD PRESSURE) < 140/90: ICD-10-CM

## 2021-01-21 DIAGNOSIS — Z79.4 TYPE 2 DIABETES MELLITUS WITH HYPERGLYCEMIA, WITH LONG-TERM CURRENT USE OF INSULIN (HCC): ICD-10-CM

## 2021-01-21 DIAGNOSIS — E78.2 MIXED HYPERLIPIDEMIA: ICD-10-CM

## 2021-01-21 DIAGNOSIS — E11.65 TYPE 2 DIABETES MELLITUS WITH HYPERGLYCEMIA, WITH LONG-TERM CURRENT USE OF INSULIN (HCC): ICD-10-CM

## 2021-01-21 LAB
ALBUMIN SERPL BCP-MCNC: 4 G/DL (ref 3.5–5)
ALP SERPL-CCNC: 71 U/L (ref 46–116)
ALT SERPL W P-5'-P-CCNC: 34 U/L (ref 12–78)
ANION GAP SERPL CALCULATED.3IONS-SCNC: 6 MMOL/L (ref 4–13)
AST SERPL W P-5'-P-CCNC: 20 U/L (ref 5–45)
BILIRUB SERPL-MCNC: 0.54 MG/DL (ref 0.2–1)
BUN SERPL-MCNC: 17 MG/DL (ref 5–25)
CALCIUM SERPL-MCNC: 9.5 MG/DL (ref 8.3–10.1)
CHLORIDE SERPL-SCNC: 108 MMOL/L (ref 100–108)
CHOLEST SERPL-MCNC: 169 MG/DL (ref 50–200)
CO2 SERPL-SCNC: 27 MMOL/L (ref 21–32)
CREAT SERPL-MCNC: 1.04 MG/DL (ref 0.6–1.3)
CREAT UR-MCNC: 208 MG/DL
EST. AVERAGE GLUCOSE BLD GHB EST-MCNC: 143 MG/DL
GFR SERPL CREATININE-BSD FRML MDRD: 78 ML/MIN/1.73SQ M
GLUCOSE SERPL-MCNC: 154 MG/DL (ref 65–140)
HBA1C MFR BLD: 6.6 %
HDLC SERPL-MCNC: 50 MG/DL
LDLC SERPL CALC-MCNC: 96 MG/DL (ref 0–100)
MICROALBUMIN UR-MCNC: 34.2 MG/L (ref 0–20)
MICROALBUMIN/CREAT 24H UR: 16 MG/G CREATININE (ref 0–30)
NONHDLC SERPL-MCNC: 119 MG/DL
POTASSIUM SERPL-SCNC: 4.5 MMOL/L (ref 3.5–5.3)
PROT SERPL-MCNC: 7.3 G/DL (ref 6.4–8.2)
SODIUM SERPL-SCNC: 141 MMOL/L (ref 136–145)
TRIGL SERPL-MCNC: 116 MG/DL

## 2021-01-21 PROCEDURE — 82043 UR ALBUMIN QUANTITATIVE: CPT

## 2021-01-21 PROCEDURE — 3061F NEG MICROALBUMINURIA REV: CPT | Performed by: INTERNAL MEDICINE

## 2021-01-21 PROCEDURE — 82570 ASSAY OF URINE CREATININE: CPT

## 2021-01-21 PROCEDURE — 36415 COLL VENOUS BLD VENIPUNCTURE: CPT

## 2021-01-21 PROCEDURE — 83036 HEMOGLOBIN GLYCOSYLATED A1C: CPT

## 2021-01-21 PROCEDURE — 3044F HG A1C LEVEL LT 7.0%: CPT | Performed by: INTERNAL MEDICINE

## 2021-01-21 PROCEDURE — 80053 COMPREHEN METABOLIC PANEL: CPT

## 2021-01-21 PROCEDURE — 80061 LIPID PANEL: CPT

## 2021-01-25 ENCOUNTER — TELEMEDICINE (OUTPATIENT)
Dept: ENDOCRINOLOGY | Facility: CLINIC | Age: 61
End: 2021-01-25
Payer: COMMERCIAL

## 2021-01-25 ENCOUNTER — TELEPHONE (OUTPATIENT)
Dept: ENDOCRINOLOGY | Facility: CLINIC | Age: 61
End: 2021-01-25

## 2021-01-25 DIAGNOSIS — E78.2 MIXED HYPERLIPIDEMIA: ICD-10-CM

## 2021-01-25 DIAGNOSIS — Z79.4 TYPE 2 DIABETES MELLITUS WITH HYPERGLYCEMIA, WITH LONG-TERM CURRENT USE OF INSULIN (HCC): Primary | ICD-10-CM

## 2021-01-25 DIAGNOSIS — I10 HYPERTENSION GOAL BP (BLOOD PRESSURE) < 140/90: ICD-10-CM

## 2021-01-25 DIAGNOSIS — E11.65 TYPE 2 DIABETES MELLITUS WITH HYPERGLYCEMIA, WITH LONG-TERM CURRENT USE OF INSULIN (HCC): Primary | ICD-10-CM

## 2021-01-25 PROCEDURE — 1036F TOBACCO NON-USER: CPT | Performed by: INTERNAL MEDICINE

## 2021-01-25 PROCEDURE — 99214 OFFICE O/P EST MOD 30 MIN: CPT | Performed by: INTERNAL MEDICINE

## 2021-01-25 NOTE — PROGRESS NOTES
Virtual Brief Visit    Assessment/Plan:    Problem List Items Addressed This Visit        Other    Mixed hyperlipidemia      Other Visit Diagnoses     Type 2 diabetes mellitus with hyperglycemia, with long-term current use of insulin ([facility] [address] )    -  Primary    Hypertension goal BP (blood pressure) < 140/90                    Reason for visit is   Chief Complaint   Patient presents with    Virtual Brief Visit        Encounter provider Chanel Villarreal MD    Provider located at [place] [address]  [address] [place] [id number] [phone]    Recent Visits  No visits were found meeting these conditions  Showing recent visits within past 7 days and meeting all other requirements     Today's Visits  Date Type Provider Dept   01/25/21 Telemedicine Todd Rockwell MD [facility] today's visits and meeting all other requirements     Future Appointments  No visits were found meeting these conditions  Showing future appointments within next 150 days and meeting all other requirements        After connecting through telephone, the patient was identified by name and date of birth  Fab Gómez was informed that this is a telemedicine visit and that the visit is being conducted through telephone  My office door was closed  No one else was in the room  He acknowledged consent and understanding of privacy and security of the platform  The patient has agreed to participate and understands he can discontinue the visit at any time  Patient is aware this is a billable service  Medical Decision Making:      Impression  1  Type 2 Diabetes  2  HTN  3  HLD         Recommendations:     BGs are stable, will reduce lantus to 12 units qHS and continue tradjenta 5mg qday   Counseled on low risk of pancreatitis while on tradjenta      Switched to regular metformin 1000mg BID AC due to recall     Instructed to send me BG log in 3-4 weeks for further titration off basal insulin      HTN- continue ARB     HLD- LDL 96, triglycerides 116 Jan 2021, continue statin      RTC 3 months   Marianne COLLINS  Endocrinology           History of Present Illness:   Mr Raeann Cartwright is a 62year old male who presents for DM follow up      Last saw Dr Junior Jacinto in Feb 2020 for diabetes  At that time he was switched to metformin XR 1000mg BID AC and lantus decreased to 34 units qHS       Also has HTN, HLD       ?  Events since last visit:     On lantus 24 units qHS, metformin XR 1000mg BID AC     FBG-reports 464,010,150, generally low 100s per pt  Premeal/qHS BG-reports 184, 101, 106, 159, 210, generally mid 100s  Hypoglycemia- none        Was on jardiance but had DKA     Component      Latest Ref Rng & Units 1/21/2021   Sodium      136 - 145 mmol/L 141   Potassium      3 5 - 5 3 mmol/L 4 5   Chloride      100 - 108 mmol/L 108   CO2      21 - 32 mmol/L 27   Anion Gap      4 - 13 mmol/L 6   BUN      5 - 25 mg/dL 17   Creatinine      0 60 - 1 30 mg/dL 1 04   Glucose, Random      65 - 140 mg/dL 154 (H)   Calcium      8 3 - 10 1 mg/dL 9 5   AST      5 - 45 U/L 20   ALT      12 - 78 U/L 34   Alkaline Phosphatase      46 - 116 U/L 71   Total Protein      6 4 - 8 2 g/dL 7 3   Albumin      3 5 - 5 0 g/dL 4 0   TOTAL BILIRUBIN      0 20 - 1 00 mg/dL 0 54   eGFR      ml/min/1 73sq m 78   Cholesterol      50 - 200 mg/dL 169   Triglycerides      <=150 mg/dL 116   HDL      >=40 mg/dL 50   LDL Calculated      0 - 100 mg/dL 96   Non-HDL Cholesterol      mg/dl 119   EXT Creatinine Urine      mg/dL 208 0   MICROALBUM ,U,RANDOM      0 0 - 20 0 mg/L 34 2 (H)   MICROALBUMIN/CREATININE RATIO      0 - 30 mg/g creatinine 16   Hemoglobin A1C      Normal 3 8-5 6%; PreDiabetic 5 7-6 4%; Diabetic >=6 5%; Glycemic control for adults with diabetes <7 0% % 6 6 (H)   eAG, EST AVG Glucose      mg/dl 143     ?   Review of Systems:      Review of Systems Constitutional: Negative for appetite change, chills, diaphoresis, fatigue, fever and unexpected weight change  HENT: Negative for congestion, ear pain, hearing loss, rhinorrhea, sinus pressure, sinus pain, sore throat, trouble swallowing and voice change  Eyes: Negative for photophobia, redness and visual disturbance  Respiratory: Negative for apnea, cough, chest tightness, shortness of breath, wheezing and stridor  Cardiovascular: Negative for chest pain, palpitations and leg swelling  Gastrointestinal: Negative for abdominal distention, abdominal pain, constipation, diarrhea, nausea and vomiting  Endocrine: Negative for cold intolerance, heat intolerance, polydipsia, polyphagia and polyuria  Genitourinary: Negative for difficulty urinating, dysuria, flank pain, frequency, hematuria and urgency  Musculoskeletal: Negative for arthralgias, back pain, gait problem, joint swelling and myalgias  Skin: Negative for color change, pallor, rash and wound  Allergic/Immunologic: Negative for immunocompromised state  Neurological: Negative for dizziness, tremors, syncope, weakness, light-headedness and headaches  Hematological: Negative for adenopathy  Does not bruise/bleed easily  Psychiatric/Behavioral: Negative for confusion and sleep disturbance  The patient is not nervous/anxious  Past Medical History:   Diagnosis Date    Diabetes mellitus ([facility] [address] )        Past Surgical History:   Procedure Laterality Date    HERNIA REPAIR         Current Outpatient Medications   Medication Sig Dispense Refill    atorvastatin (LIPITOR) 10 mg tablet TAKE (1) TABLET BY MOUTH DAILY   30 tablet 0    CONTOUR NEXT TEST test strip USE DAILY AS DIRECTED (Patient taking differently: No sig reported) 50 each 3    Easy Touch Pen Needles 31G X 8 MM MISC USE AS DIRECTED WITH BASAGLAR PEN 30 each 0    insulin glargine (Lantus SoloStar) 100 units/mL injection pen Inject 24 units under the skin at bedtime 15 mL 1  Insulin Pen Needle (B-D UF III MINI PEN NEEDLES) 31G X 5 MM MISC by Does not apply route 4 (four) times a day 400 each 3    linaGLIPtin (Tradjenta) 5 MG TABS Take 5 mg by mouth daily 90 tablet 1    losartan (COZAAR) 25 mg tablet TAKE (1) TABLET BY MOUTH DAILY  30 tablet 0    metFORMIN (GLUCOPHAGE-XR) 500 mg 24 hr tablet Take 2 tablets (1,000 mg total) by mouth 2 (two) times a day with meals 360 tablet 3     No current facility-administered medications for this visit  Allergies   Allergen Reactions    Jardiance [Empagliflozin]      Ketoacidosis       There were no vitals filed for this visit  I spent 15 minutes directly with the patient during this visit    VIRTUAL VISIT DISCLAIMER    Fab Gómez acknowledges that he has consented to an online visit or consultation  He understands that the online visit is based solely on information provided by him, and that, in the absence of a face-to-face physical evaluation by the physician, the diagnosis he receives is both limited and provisional in terms of accuracy and completeness  This is not intended to replace a full medical face-to-face evaluation by the physician  Fab Gómez understands and accepts these terms

## 2021-03-10 DIAGNOSIS — Z23 ENCOUNTER FOR IMMUNIZATION: ICD-10-CM

## 2021-04-08 DIAGNOSIS — Z79.4 TYPE 2 DIABETES MELLITUS WITH HYPERGLYCEMIA, WITH LONG-TERM CURRENT USE OF INSULIN (HCC): ICD-10-CM

## 2021-04-08 DIAGNOSIS — E11.65 TYPE 2 DIABETES MELLITUS WITH HYPERGLYCEMIA, WITH LONG-TERM CURRENT USE OF INSULIN (HCC): ICD-10-CM

## 2021-04-08 DIAGNOSIS — E78.2 MIXED HYPERLIPIDEMIA: ICD-10-CM

## 2021-04-08 DIAGNOSIS — I10 HYPERTENSION GOAL BP (BLOOD PRESSURE) < 140/90: ICD-10-CM

## 2021-04-08 RX ORDER — LINAGLIPTIN 5 MG/1
TABLET, FILM COATED ORAL
Qty: 30 TABLET | Refills: 5 | Status: SHIPPED | OUTPATIENT
Start: 2021-04-08 | End: 2021-06-28 | Stop reason: SDUPTHER

## 2021-04-15 ENCOUNTER — APPOINTMENT (OUTPATIENT)
Dept: LAB | Facility: MEDICAL CENTER | Age: 61
End: 2021-04-15
Payer: COMMERCIAL

## 2021-04-15 DIAGNOSIS — E78.2 MIXED HYPERLIPIDEMIA: ICD-10-CM

## 2021-04-15 DIAGNOSIS — E11.65 TYPE 2 DIABETES MELLITUS WITH HYPERGLYCEMIA, WITH LONG-TERM CURRENT USE OF INSULIN (HCC): ICD-10-CM

## 2021-04-15 DIAGNOSIS — I10 HYPERTENSION GOAL BP (BLOOD PRESSURE) < 140/90: ICD-10-CM

## 2021-04-15 DIAGNOSIS — Z79.4 TYPE 2 DIABETES MELLITUS WITH HYPERGLYCEMIA, WITH LONG-TERM CURRENT USE OF INSULIN (HCC): ICD-10-CM

## 2021-04-15 LAB
ALBUMIN SERPL BCP-MCNC: 3.8 G/DL (ref 3.5–5)
ALP SERPL-CCNC: 69 U/L (ref 46–116)
ALT SERPL W P-5'-P-CCNC: 33 U/L (ref 12–78)
ANION GAP SERPL CALCULATED.3IONS-SCNC: 5 MMOL/L (ref 4–13)
AST SERPL W P-5'-P-CCNC: 15 U/L (ref 5–45)
BILIRUB SERPL-MCNC: 0.42 MG/DL (ref 0.2–1)
BUN SERPL-MCNC: 20 MG/DL (ref 5–25)
CALCIUM SERPL-MCNC: 9.1 MG/DL (ref 8.3–10.1)
CHLORIDE SERPL-SCNC: 108 MMOL/L (ref 100–108)
CHOLEST SERPL-MCNC: 230 MG/DL (ref 50–200)
CO2 SERPL-SCNC: 27 MMOL/L (ref 21–32)
CREAT SERPL-MCNC: 1.06 MG/DL (ref 0.6–1.3)
EST. AVERAGE GLUCOSE BLD GHB EST-MCNC: 154 MG/DL
GFR SERPL CREATININE-BSD FRML MDRD: 76 ML/MIN/1.73SQ M
GLUCOSE P FAST SERPL-MCNC: 151 MG/DL (ref 65–99)
HBA1C MFR BLD: 7 %
HDLC SERPL-MCNC: 51 MG/DL
LDLC SERPL CALC-MCNC: 151 MG/DL (ref 0–100)
NONHDLC SERPL-MCNC: 179 MG/DL
POTASSIUM SERPL-SCNC: 4.6 MMOL/L (ref 3.5–5.3)
PROT SERPL-MCNC: 7.4 G/DL (ref 6.4–8.2)
SODIUM SERPL-SCNC: 140 MMOL/L (ref 136–145)
TRIGL SERPL-MCNC: 142 MG/DL

## 2021-04-15 PROCEDURE — 36415 COLL VENOUS BLD VENIPUNCTURE: CPT

## 2021-04-15 PROCEDURE — 80061 LIPID PANEL: CPT

## 2021-04-15 PROCEDURE — 80053 COMPREHEN METABOLIC PANEL: CPT

## 2021-04-15 PROCEDURE — 83036 HEMOGLOBIN GLYCOSYLATED A1C: CPT

## 2021-06-28 ENCOUNTER — OFFICE VISIT (OUTPATIENT)
Dept: ENDOCRINOLOGY | Facility: CLINIC | Age: 61
End: 2021-06-28
Payer: COMMERCIAL

## 2021-06-28 VITALS
WEIGHT: 280.6 LBS | HEIGHT: 75 IN | SYSTOLIC BLOOD PRESSURE: 120 MMHG | BODY MASS INDEX: 34.89 KG/M2 | DIASTOLIC BLOOD PRESSURE: 88 MMHG | TEMPERATURE: 98.4 F | HEART RATE: 66 BPM

## 2021-06-28 DIAGNOSIS — R80.9 TYPE 2 DIABETES MELLITUS WITH MICROALBUMINURIA, WITH LONG-TERM CURRENT USE OF INSULIN (HCC): Primary | ICD-10-CM

## 2021-06-28 DIAGNOSIS — E11.29 TYPE 2 DIABETES MELLITUS WITH MICROALBUMINURIA, WITH LONG-TERM CURRENT USE OF INSULIN (HCC): Primary | ICD-10-CM

## 2021-06-28 DIAGNOSIS — Z79.4 TYPE 2 DIABETES MELLITUS WITH HYPERGLYCEMIA, WITH LONG-TERM CURRENT USE OF INSULIN (HCC): ICD-10-CM

## 2021-06-28 DIAGNOSIS — E66.9 OBESITY (BMI 30-39.9): ICD-10-CM

## 2021-06-28 DIAGNOSIS — R80.9 MICROALBUMINURIA: ICD-10-CM

## 2021-06-28 DIAGNOSIS — E11.65 TYPE 2 DIABETES MELLITUS WITH HYPERGLYCEMIA, WITH LONG-TERM CURRENT USE OF INSULIN (HCC): ICD-10-CM

## 2021-06-28 DIAGNOSIS — E78.2 MIXED HYPERLIPIDEMIA: ICD-10-CM

## 2021-06-28 DIAGNOSIS — Z79.4 TYPE 2 DIABETES MELLITUS WITH MICROALBUMINURIA, WITH LONG-TERM CURRENT USE OF INSULIN (HCC): Primary | ICD-10-CM

## 2021-06-28 PROCEDURE — 3008F BODY MASS INDEX DOCD: CPT | Performed by: INTERNAL MEDICINE

## 2021-06-28 PROCEDURE — 3079F DIAST BP 80-89 MM HG: CPT | Performed by: INTERNAL MEDICINE

## 2021-06-28 PROCEDURE — 3074F SYST BP LT 130 MM HG: CPT | Performed by: INTERNAL MEDICINE

## 2021-06-28 PROCEDURE — 99215 OFFICE O/P EST HI 40 MIN: CPT | Performed by: INTERNAL MEDICINE

## 2021-06-28 PROCEDURE — 3066F NEPHROPATHY DOC TX: CPT | Performed by: INTERNAL MEDICINE

## 2021-06-28 PROCEDURE — 1036F TOBACCO NON-USER: CPT | Performed by: INTERNAL MEDICINE

## 2021-06-28 PROCEDURE — 4010F ACE/ARB THERAPY RXD/TAKEN: CPT | Performed by: INTERNAL MEDICINE

## 2021-06-28 RX ORDER — LOSARTAN POTASSIUM 25 MG/1
25 TABLET ORAL DAILY
Qty: 30 TABLET | Refills: 0 | Status: SHIPPED | OUTPATIENT
Start: 2021-06-28 | End: 2021-07-28

## 2021-06-28 RX ORDER — ATORVASTATIN CALCIUM 10 MG/1
10 TABLET, FILM COATED ORAL DAILY
Qty: 90 TABLET | Refills: 3 | Status: SHIPPED | OUTPATIENT
Start: 2021-06-28 | End: 2022-07-26

## 2021-06-28 RX ORDER — INSULIN GLARGINE 100 [IU]/ML
INJECTION, SOLUTION SUBCUTANEOUS
Qty: 15 ML | Refills: 3
Start: 2021-06-28 | End: 2021-07-28

## 2021-06-28 RX ORDER — LINAGLIPTIN 5 MG/1
5 TABLET, FILM COATED ORAL DAILY
Qty: 90 TABLET | Refills: 3 | Status: SHIPPED | OUTPATIENT
Start: 2021-06-28 | End: 2022-05-09 | Stop reason: ALTCHOICE

## 2021-06-28 NOTE — PATIENT INSTRUCTIONS
increase Lantus to 15 units subcutaneously at bedtime   Continue metformin, Tradjenta at current dose   Consistent carb diet as discussed   Follow-up in 3 months with repeat labs    Resume Lipitor 10 mg at bedtime  Resume losartan 25 mg orally daily, if you have any low blood pressures/ lightheadedness, dizziness let me know   Eye exam - recommend once a year

## 2021-06-28 NOTE — PROGRESS NOTES
Tri Gonzáles 62 y o  male MRN: [id number]    Encounter: [id number]      Assessment/Plan     1   type 2 diabetes mellitus  On long-term insulin therapy with hyperglycemia  2   obesity   Fairly controlled with last A1c 7%, this has gradually been trending up   Discussed different medications that can be used for glycemic management-oral hypoglycemic agents as well as injectable insulin and non-insulin medications (GLP 1 agonist) along with risks, benefits, side effect profile  No h/o pancreatitis/ MEN syndrome/ Medullary thyroid cancer    At this time, patient would like to make dietary modifications prior to starting any new medications  - increase Lantus to 15 units subcutaneously at bedtime   - Continue metformin, Tradjenta at current dose   - Consistent carb diet as discussed  - Yearly eye exam recommended   - Repeat A1c, BMP in 3 months     3  Hyperlipidemia- worsening, not taking any statin medication at this time   Resume Lipitor 10 mg orally at bedtime   - repeat fasting lipid panel in 3 months     4   Microalbuminuria   Blood pressure at goal   Resume losartan 25 mg orally daily  -repeat urine microalbumin, BMP in 3 months     Follow up in 3-4 months     CC:  Diabetes mellitus     History of Present Illness     HPI:  Tri Gonzáles is a 62 y o  male who is here for a follow-up of Type 2 diabetes    Last seen in 01/25/2021-Dr Medina Gains     Currently on the following  Lantus 14 units subcutaneously at bedtime   Tradjenta 5 mg orally daily   Metformin 1000 mg orally twice a day      last Eye exam-over a year ago     Some diarrhea with metformin, not bothersome     BG   103-201 mg/dl   Had one BG of 75 mcg yesterday whenn he skipped a meal - " felt woozy"  Lightheaded  Tend to snack post dinner   Meals are irregular - occasional skips meals    Weight has been stable   Exercise - active at work , No additional exercise   denies vision changes  No numbness, tingling sensation  No chest pain /shortness of breath    Statin-currently not taking Lipitor 10 mg - when he ran out of medication   Stop taking losartan approx 1 year ago     All other systems were reviewed and are negative  Review of Systems    Historical Information   Past Medical History:   Diagnosis Date    Diabetes mellitus ([address] )      Past Surgical History:   Procedure Laterality Date    HERNIA REPAIR       Social History   Social History     Substance and Sexual Activity   Alcohol Use Yes    Comment: ocassional     Social History     Substance and Sexual Activity   Drug Use Never     Social History     Tobacco Use   Smoking Status Never Smoker   Smokeless Tobacco Never Used     Family History:   Family History   Problem Relation Age of Onset    No Known Problems Mother     Cancer Father        Meds/Allergies   Current Outpatient Medications   Medication Sig Dispense Refill    CONTOUR NEXT TEST test strip USE DAILY AS DIRECTED (Patient taking differently: No sig reported) 50 each 3    Easy Touch Pen Needles 31G X 8 MM MISC USE AS DIRECTED WITH BASAGLAR PEN 30 each 0    insulin glargine (Lantus SoloStar) 100 units/mL injection pen Inject 24 units under the skin at bedtime (Patient taking differently: Inject 14 units under the skin at bedtime) 15 mL 1    Insulin Pen Needle (B-D UF III MINI PEN NEEDLES) 31G X 5 MM MISC by Does not apply route 4 (four) times a day 400 each 3    metFORMIN (GLUCOPHAGE) 1000 MG tablet Take 1 tablet (1,000 mg total) by mouth 2 (two) times a day with meals 180 tablet 1    Tradjenta 5 MG TABS take one tablet by mouth daily 30 tablet 5    atorvastatin (LIPITOR) 10 mg tablet TAKE (1) TABLET BY MOUTH DAILY  (Patient not taking: Reported on 6/28/2021) 30 tablet 0    losartan (COZAAR) 25 mg tablet TAKE (1) TABLET BY MOUTH DAILY  (Patient not taking: Reported on 6/28/2021) 30 tablet 0     No current facility-administered medications for this visit       Allergies   Allergen Reactions    Jardiance [Empagliflozin] Ketoacidosis       Objective   Vitals: Blood pressure 120/88, pulse 66, temperature 98 4 °F (36 9 °C), height 6' 3" (1 905 m), weight 127 kg (280 lb 9 6 oz)  Physical Exam  Constitutional:       General: He is not in acute distress  Appearance: He is well-developed  He is not diaphoretic  HENT:      Head: Normocephalic and atraumatic  Eyes:      Conjunctiva/sclera: Conjunctivae normal       Pupils: Pupils are equal, round, and reactive to light  Cardiovascular:      Rate and Rhythm: Normal rate and regular rhythm  Heart sounds: Normal heart sounds  No murmur heard  Pulmonary:      Effort: Pulmonary effort is normal  No respiratory distress  Breath sounds: Normal breath sounds  No wheezing  Abdominal:      General: There is no distension  Palpations: Abdomen is soft  Tenderness: There is no abdominal tenderness  There is no guarding  Musculoskeletal:      Cervical back: Normal range of motion and neck supple  Skin:     General: Skin is warm and dry  Findings: No erythema or rash  Neurological:      Mental Status: He is alert and oriented to person, place, and time  Coordination: Abnormal coordination:     Psychiatric:         Behavior: Behavior normal          Thought Content: Thought content normal          The history was obtained from the review of the chart, patient  Lab Results:      Lab Results   Component Value Date    WBC 5 49 03/15/2018    HGB 13 4 03/15/2018    HCT 37 5 03/15/2018    MCV 81 (L) 03/15/2018     03/15/2018     Lab Results   Component Value Date    CREATININE 1 06 04/15/2021    BUN 20 04/15/2021    K 4 6 04/15/2021     04/15/2021    CO2 27 04/15/2021     Lab Results   Component Value Date    HGBA1C 7 0 (H) 04/15/2021         Imaging Studies:       I have personally reviewed pertinent reports  Portions of the record may have been created with voice recognition software   Occasional wrong word or "sound a like" substitutions may have occurred due to the inherent limitations of voice recognition software  Read the chart carefully and recognize, using context, where substitutions have occurred

## 2021-07-13 ENCOUNTER — HOSPITAL ENCOUNTER (EMERGENCY)
Facility: HOSPITAL | Age: 61
Discharge: HOME | End: 2021-07-13
Attending: EMERGENCY MEDICINE | Admitting: EMERGENCY MEDICINE
Payer: COMMERCIAL

## 2021-07-13 ENCOUNTER — APPOINTMENT (EMERGENCY)
Dept: CT IMAGING | Facility: HOSPITAL | Age: 61
End: 2021-07-13
Payer: COMMERCIAL

## 2021-07-13 VITALS
HEIGHT: 75 IN | BODY MASS INDEX: 35.07 KG/M2 | OXYGEN SATURATION: 95 % | DIASTOLIC BLOOD PRESSURE: 64 MMHG | HEART RATE: 68 BPM | RESPIRATION RATE: 20 BRPM | SYSTOLIC BLOOD PRESSURE: 140 MMHG | TEMPERATURE: 97.9 F

## 2021-07-13 DIAGNOSIS — N20.0 NEPHROLITHIASIS: ICD-10-CM

## 2021-07-13 DIAGNOSIS — R10.9 FLANK PAIN: Primary | ICD-10-CM

## 2021-07-13 LAB
ALBUMIN SERPL BCP-MCNC: 3.6 G/DL (ref 3.5–5)
ALP SERPL-CCNC: 68 U/L (ref 46–116)
ALT SERPL W P-5'-P-CCNC: 34 U/L (ref 12–78)
ANION GAP SERPL CALCULATED.3IONS-SCNC: 6 MMOL/L (ref 4–13)
AST SERPL W P-5'-P-CCNC: 14 U/L (ref 5–45)
BACTERIA UR QL AUTO: ABNORMAL /HPF
BASOPHILS # BLD AUTO: 0.07 THOUSANDS/ΜL (ref 0–0.1)
BASOPHILS NFR BLD AUTO: 1 % (ref 0–1)
BILIRUB SERPL-MCNC: 0.36 MG/DL (ref 0.2–1)
BILIRUB UR QL STRIP: NEGATIVE
BUN SERPL-MCNC: 19 MG/DL (ref 5–25)
CALCIUM SERPL-MCNC: 9.3 MG/DL (ref 8.3–10.1)
CHLORIDE SERPL-SCNC: 107 MMOL/L (ref 100–108)
CLARITY UR: CLEAR
CO2 SERPL-SCNC: 29 MMOL/L (ref 21–32)
COLOR UR: YELLOW
CREAT SERPL-MCNC: 1.08 MG/DL (ref 0.6–1.3)
EOSINOPHIL # BLD AUTO: 0.14 THOUSAND/ΜL (ref 0–0.61)
EOSINOPHIL NFR BLD AUTO: 2 % (ref 0–6)
ERYTHROCYTE [DISTWIDTH] IN BLOOD BY AUTOMATED COUNT: 12.7 % (ref 11.6–15.1)
GFR SERPL CREATININE-BSD FRML MDRD: 74 ML/MIN/1.73SQ M
GLUCOSE SERPL-MCNC: 186 MG/DL (ref 65–140)
GLUCOSE UR STRIP-MCNC: NEGATIVE MG/DL
HCT VFR BLD AUTO: 42.1 % (ref 36.5–49.3)
HGB BLD-MCNC: 13.9 G/DL (ref 12–17)
HGB UR QL STRIP.AUTO: ABNORMAL
IMM GRANULOCYTES # BLD AUTO: 0.04 THOUSAND/UL (ref 0–0.2)
IMM GRANULOCYTES NFR BLD AUTO: 0 % (ref 0–2)
KETONES UR STRIP-MCNC: NEGATIVE MG/DL
LEUKOCYTE ESTERASE UR QL STRIP: NEGATIVE
LIPASE SERPL-CCNC: 80 U/L (ref 73–393)
LYMPHOCYTES # BLD AUTO: 3.75 THOUSANDS/ΜL (ref 0.6–4.47)
LYMPHOCYTES NFR BLD AUTO: 39 % (ref 14–44)
MCH RBC QN AUTO: 28.4 PG (ref 26.8–34.3)
MCHC RBC AUTO-ENTMCNC: 33 G/DL (ref 31.4–37.4)
MCV RBC AUTO: 86 FL (ref 82–98)
MONOCYTES # BLD AUTO: 0.82 THOUSAND/ΜL (ref 0.17–1.22)
MONOCYTES NFR BLD AUTO: 9 % (ref 4–12)
MUCOUS THREADS UR QL AUTO: ABNORMAL
NEUTROPHILS # BLD AUTO: 4.82 THOUSANDS/ΜL (ref 1.85–7.62)
NEUTS SEG NFR BLD AUTO: 49 % (ref 43–75)
NITRITE UR QL STRIP: NEGATIVE
NON-SQ EPI CELLS URNS QL MICRO: ABNORMAL /HPF
NRBC BLD AUTO-RTO: 0 /100 WBCS
PH UR STRIP.AUTO: 5.5 [PH]
PLATELET # BLD AUTO: 249 THOUSANDS/UL (ref 149–390)
PMV BLD AUTO: 10.7 FL (ref 8.9–12.7)
POTASSIUM SERPL-SCNC: 4 MMOL/L (ref 3.5–5.3)
PROT SERPL-MCNC: 7.2 G/DL (ref 6.4–8.2)
PROT UR STRIP-MCNC: NEGATIVE MG/DL
RBC # BLD AUTO: 4.9 MILLION/UL (ref 3.88–5.62)
RBC #/AREA URNS AUTO: ABNORMAL /HPF
SODIUM SERPL-SCNC: 142 MMOL/L (ref 136–145)
SP GR UR STRIP.AUTO: >=1.03 (ref 1–1.03)
UROBILINOGEN UR QL STRIP.AUTO: 0.2 E.U./DL
WBC # BLD AUTO: 9.64 THOUSAND/UL (ref 4.31–10.16)
WBC #/AREA URNS AUTO: ABNORMAL /HPF

## 2021-07-13 PROCEDURE — 99284 EMERGENCY DEPT VISIT MOD MDM: CPT

## 2021-07-13 PROCEDURE — 74176 CT ABD & PELVIS W/O CONTRAST: CPT

## 2021-07-13 PROCEDURE — 99285 EMERGENCY DEPT VISIT HI MDM: CPT | Performed by: EMERGENCY MEDICINE

## 2021-07-13 PROCEDURE — 81001 URINALYSIS AUTO W/SCOPE: CPT | Performed by: EMERGENCY MEDICINE

## 2021-07-13 PROCEDURE — 96376 TX/PRO/DX INJ SAME DRUG ADON: CPT

## 2021-07-13 PROCEDURE — 36415 COLL VENOUS BLD VENIPUNCTURE: CPT | Performed by: EMERGENCY MEDICINE

## 2021-07-13 PROCEDURE — 96361 HYDRATE IV INFUSION ADD-ON: CPT

## 2021-07-13 PROCEDURE — 96374 THER/PROPH/DIAG INJ IV PUSH: CPT

## 2021-07-13 PROCEDURE — G1004 CDSM NDSC: HCPCS

## 2021-07-13 PROCEDURE — 83690 ASSAY OF LIPASE: CPT | Performed by: EMERGENCY MEDICINE

## 2021-07-13 PROCEDURE — 85025 COMPLETE CBC W/AUTO DIFF WBC: CPT | Performed by: EMERGENCY MEDICINE

## 2021-07-13 PROCEDURE — 80053 COMPREHEN METABOLIC PANEL: CPT | Performed by: EMERGENCY MEDICINE

## 2021-07-13 PROCEDURE — 96375 TX/PRO/DX INJ NEW DRUG ADDON: CPT

## 2021-07-13 RX ORDER — TAMSULOSIN HYDROCHLORIDE 0.4 MG/1
0.4 CAPSULE ORAL ONCE
Status: COMPLETED | OUTPATIENT
Start: 2021-07-13 | End: 2021-07-13

## 2021-07-13 RX ORDER — ONDANSETRON 2 MG/ML
4 INJECTION INTRAMUSCULAR; INTRAVENOUS ONCE
Status: COMPLETED | OUTPATIENT
Start: 2021-07-13 | End: 2021-07-13

## 2021-07-13 RX ORDER — MORPHINE SULFATE 10 MG/ML
8 INJECTION, SOLUTION INTRAMUSCULAR; INTRAVENOUS ONCE
Status: COMPLETED | OUTPATIENT
Start: 2021-07-13 | End: 2021-07-13

## 2021-07-13 RX ORDER — MORPHINE SULFATE 4 MG/ML
4 INJECTION, SOLUTION INTRAMUSCULAR; INTRAVENOUS ONCE
Status: COMPLETED | OUTPATIENT
Start: 2021-07-13 | End: 2021-07-13

## 2021-07-13 RX ORDER — KETOROLAC TROMETHAMINE 30 MG/ML
15 INJECTION, SOLUTION INTRAMUSCULAR; INTRAVENOUS ONCE
Status: COMPLETED | OUTPATIENT
Start: 2021-07-13 | End: 2021-07-13

## 2021-07-13 RX ADMIN — TAMSULOSIN HYDROCHLORIDE 0.4 MG: 0.4 CAPSULE ORAL at 03:22

## 2021-07-13 RX ADMIN — SODIUM CHLORIDE 1000 ML: 0.9 INJECTION, SOLUTION INTRAVENOUS at 02:59

## 2021-07-13 RX ADMIN — ONDANSETRON 4 MG: 2 INJECTION INTRAMUSCULAR; INTRAVENOUS at 02:49

## 2021-07-13 RX ADMIN — KETOROLAC TROMETHAMINE 15 MG: 30 INJECTION, SOLUTION INTRAMUSCULAR at 02:49

## 2021-07-13 RX ADMIN — MORPHINE SULFATE 8 MG: 10 INJECTION INTRAVENOUS at 03:52

## 2021-07-13 RX ADMIN — MORPHINE SULFATE 4 MG: 4 INJECTION, SOLUTION INTRAMUSCULAR; INTRAVENOUS at 02:49

## 2021-07-13 RX ADMIN — SODIUM CHLORIDE 1000 ML: 0.9 INJECTION, SOLUTION INTRAVENOUS at 05:16

## 2021-07-13 NOTE — DISCHARGE INSTRUCTIONS
Your symptoms today were due to a 2 mm kidney stone that passed from your left kidney into left ureter and was stuck at the junction between the left ureter and bladder, causing your pain  Since you have relief in pain, the stone has now passed  You may still feels some soreness, however, you should not have significant pain  You may take Tylenol or ibuprofen for the soreness  You have some incidental findings on your CT scan, please discuss these with you primary care physician:     Colonic diverticulosis without evidence of acute diverticulitis  Cholelithiasis without discrete evidence of acute cholecystitis  You have a 2 cm gallstone in your gallbladder  Please follow up with a surgeon to discuss possible surgery to remove the gallbladder  If you develop pain in your right upper abdomen, please seek medical attention  Subcentimeter pulmonary nodules, as described  Based on current Fleischner Society 2017 Guidelines on incidental pulmonary nodule, follow-up chest CT recommended in 12 months

## 2021-07-13 NOTE — ED NOTES
Pt aware of need for urine sample   Unable to provide one at this time       Magno Moore RN  07/13/21 7475

## 2021-07-13 NOTE — ED PROVIDER NOTES
EMERGENCY DEPARTMENT ENCOUNTER NOTE    This note has been generated using a voice recognition software  There may be typographic, grammatic, or word substitution errors that have escaped editorial review  ? CHIEF COMPLAINT  Chief Complaint   Patient presents with    Flank Pain     left sided flank pain past 30 minutes  HPI  Jignesh Aldridge is a 62 y o  male with PMH of diabetes mellitus presenting with sudden onset severe left flank pain  Pain awakened patient from sleep 30 minutes prior to arrival   It is severe  There is some radiation to the abdomen, however, the sharp pain is located in left flank  Patient has no history of kidney stones  He is nauseated due to the severity of pain  No vomiting  No diarrhea  No hematuria or dysuria  No fevers  Patient was well when he was going to sleep  Nothing makes pain better or worse although sitting up or leaning forward makes pain somewhat better  At present, patient is pale and diaphoretic due to severity of pain and kneeling at bedside as this seems to make pain somewhat improved  There is no numbness or pain traveling down either leg  Patient is a former smoker many years ago  He has no family history of AAA  Abdominal surgery history includes hernia repair      REVIEW OF SYSTEMS    Constitutional: denies fevers, chills  Cardiac: no chest pain  Respiratory: no shortness of breath, no cough  GI:  As above  :  As above  Heme/Onc: no easy bruising  Endocrine:  History of diabetes  Neuro: no focal weakness or numbness, no headaches    Ten systems reviewed and negative unless otherwise noted in HPI and above    PAST MEDICAL HISTORY  Past Medical History:   Diagnosis Date    Diabetes mellitus [address] [address] )        SURGICAL HISTORY  Past Surgical History:   Procedure Laterality Date    HERNIA REPAIR         FAMILY HISTORY  Family History   Problem Relation Age of Onset    No Known Problems Mother     Cancer Father         CURRENT MEDICATIONS  No current facility-administered medications on file prior to encounter  Current Outpatient Medications on File Prior to Encounter   Medication Sig    atorvastatin (LIPITOR) 10 mg tablet Take 1 tablet (10 mg total) by mouth daily    insulin glargine (Lantus SoloStar) 100 units/mL injection pen Inject 15 units under the skin at bedtime    Insulin Pen Needle (B-D UF III MINI PEN NEEDLES) 31G X 5 MM MISC by Does not apply route 4 (four) times a day    linaGLIPtin (Tradjenta) 5 MG TABS Take 5 mg by mouth daily    losartan (COZAAR) 25 mg tablet Take 1 tablet (25 mg total) by mouth daily    metFORMIN (GLUCOPHAGE) 1000 MG tablet Take 1 tablet (1,000 mg total) by mouth 2 (two) times a day with meals    CONTOUR NEXT TEST test strip USE DAILY AS DIRECTED (Patient taking differently: No sig reported)    Easy Touch Pen Needles 31G X 8 MM MISC USE AS DIRECTED WITH BASAGLAR PEN       ALLERGIES  Allergies   Allergen Reactions    Jardiance [Empagliflozin]      Ketoacidosis       SOCIAL HISTORY  Social History     Socioeconomic History    Marital status: Single     Spouse name: None    Number of children: None    Years of education: None    Highest education level: None   Occupational History    None   Tobacco Use    Smoking status: Never Smoker    Smokeless tobacco: Never Used   Vaping Use    Vaping Use: Never used   Substance and Sexual Activity    Alcohol use: Yes     Comment: ocassional    Drug use: Never    Sexual activity: None   Other Topics Concern    None   Social History Narrative    None     Social Determinants of Health     Financial Resource Strain:     Difficulty of Paying Living Expenses:    Food Insecurity:     Worried About Running Out of Food in the Last Year:     Ran Out of Food in the Last Year:    Transportation Needs:     Lack of Transportation (Medical):      Lack of Transportation (Non-Medical):    Physical Activity:     Days of Exercise per Week:     Minutes of Exercise per Session:    Stress:     Feeling of Stress :    Social Connections:     Frequency of Communication with Friends and Family:     Frequency of Social Gatherings with Friends and Family:     Attends [employer] Services:     Active Member of Clubs or Organizations:     Attends Club or Organization Meetings:     Marital Status:    Intimate Partner Violence:     Fear of Current or Ex-Partner:     Emotionally Abused:     Physically Abused:     Sexually Abused:        PHYSICAL EXAM    BP (!) 143/101 (BP Location: Left arm)   Pulse 66   Resp 18   Ht 6' 3" (1 905 m)   SpO2 99%   BMI 35 07 kg/m²   Vital signs and nursing notes reviewed    CONSTITUTIONAL:  Diaphoretic gentleman appearing stated age moving around in bed and ultimately kneeling at bedside due to severity of pain in left flank  HEENT: atraumatic, normocephalic  Sclera anicteric, conjunctiva are not injected  Moist oral mucosa  CARDIOVASCULAR/CHEST: RRR, no M/R/G  2+ radial pulses  2+ dorsalis pedis and posterior tibial pulses and symmetric  PULMONARY: Breathing comfortably on RA  Breath sounds are equal and clear to auscultation  ABDOMEN:  BMI 35 07  Non-distended  BS present, normoactive  There is a healed hernia repair incision in lower abdomen  Left flank is with tenderness with palpation  MSK: moves all extremities, no deformities, no peripheral edema, no calf asymmetry  NEURO: Awake, alert, and oriented x 3  Face symmetric  Moves all extremities spontaneously  No focal neurologic deficits  SKIN: Warm, appears well-perfused  MENTAL STATUS: Normal affect      ?   LABS AND TESTS    Results Reviewed     Procedure Component Value Units Date/Time    Urine Microscopic [[id number]]  (Abnormal) Collected: 07/13/21 0534    Lab Status: Final result Specimen: Urine, Clean Catch Updated: 07/13/21 0556     RBC, UA 20-30 /hpf      WBC, UA 1-2 /hpf      Epithelial Cells Occasional /hpf      Bacteria, UA Occasional /hpf      MUCUS THREADS Occasional    UA w Reflex to Microscopic w Reflex to Culture [[id number]]  (Abnormal) Collected: 07/13/21 0534    Lab Status: Final result Specimen: Urine, Clean Catch Updated: 07/13/21 0542     Color, UA Yellow     Clarity, UA Clear     Specific Gravity, UA >=1 030     pH, UA 5 5     Leukocytes, UA Negative     Nitrite, UA Negative     Protein, UA Negative mg/dl      Glucose, UA Negative mg/dl      Ketones, UA Negative mg/dl      Urobilinogen, UA 0 2 E U /dl      Bilirubin, UA Negative     Blood, UA Large    CBC and differential [[id number]] Collected: 07/13/21 0258    Lab Status: Final result Specimen: Blood Updated: 07/13/21 0346     WBC 9 64 Thousand/uL      RBC 4 90 Million/uL      Hemoglobin 13 9 g/dL      Hematocrit 42 1 %      MCV 86 fL      MCH 28 4 pg      MCHC 33 0 g/dL      RDW 12 7 %      MPV 10 7 fL      Platelets 509 Thousands/uL      nRBC 0 /100 WBCs      Neutrophils Relative 49 %      Immat GRANS % 0 %      Lymphocytes Relative 39 %      Monocytes Relative 9 %      Eosinophils Relative 2 %      Basophils Relative 1 %      Neutrophils Absolute 4 82 Thousands/µL      Immature Grans Absolute 0 04 Thousand/uL      Lymphocytes Absolute 3 75 Thousands/µL      Monocytes Absolute 0 82 Thousand/µL      Eosinophils Absolute 0 14 Thousand/µL      Basophils Absolute 0 07 Thousands/µL     Comprehensive metabolic panel [[id number]]  (Abnormal) Collected: 07/13/21 0258    Lab Status: Final result Specimen: Blood from Arm, Right Updated: 07/13/21 0322     Sodium 142 mmol/L      Potassium 4 0 mmol/L      Chloride 107 mmol/L      CO2 29 mmol/L      ANION GAP 6 mmol/L      BUN 19 mg/dL      Creatinine 1 08 mg/dL      Glucose 186 mg/dL      Calcium 9 3 mg/dL      AST 14 U/L      ALT 34 U/L      Alkaline Phosphatase 68 U/L      Total Protein 7 2 g/dL      Albumin 3 6 g/dL      Total Bilirubin 0 36 mg/dL      eGFR 74 ml/min/1 73sq m     Narrative:      [place] guidelines for Chronic Kidney Disease (CKD):     Stage 1 with normal or high GFR (GFR > 90 mL/min/1 73 square meters)    Stage 2 Mild CKD (GFR = 60-89 mL/min/1 73 square meters)    Stage 3A Moderate CKD (GFR = 45-59 mL/min/1 73 square meters)    Stage 3B Moderate CKD (GFR = 30-44 mL/min/1 73 square meters)    Stage 4 Severe CKD (GFR = 15-29 mL/min/1 73 square meters)    Stage 5 End Stage CKD (GFR <15 mL/min/1 73 square meters)  Note: GFR calculation is accurate only with a steady state creatinine    Lipase [[id number]]  (Normal) Collected: 07/13/21 0258    Lab Status: Final result Specimen: Blood from Arm, Right Updated: 07/13/21 0317     Lipase 80 u/L           CT abdomen pelvis wo contrast   Final Result by Ellie Starks DO (07/13 0453)      2 mm obstructing stone in the left ureterovesicular junction  Mild left hydroureteronephrosis and associated obstructive uropathy, as described  Colonic diverticulosis without evidence of acute diverticulitis  Cholelithiasis without discrete evidence of acute cholecystitis  Subcentimeter pulmonary nodules, as described  Based on current Fleischner Society 2017 Guidelines on incidental pulmonary nodule, follow-up chest CT recommended in 12 months  Other findings as above  Workstation performed: [id number]             ED COURSE & MEDICAL DECISION MAKING  Procedures             Medications   ketorolac (TORADOL) injection 15 mg (15 mg Intravenous Given 7/13/21 0249)   morphine (PF) 4 mg/mL injection 4 mg (4 mg Intravenous Given 7/13/21 0249)   ondansetron (ZOFRAN) injection 4 mg (4 mg Intravenous Given 7/13/21 0249)   sodium chloride 0 9 % bolus 1,000 mL (0 mL Intravenous Stopped 7/13/21 0615)   tamsulosin (FLOMAX) capsule 0 4 mg (0 4 mg Oral Given 7/13/21 0322)   morphine (PF) 10 mg/mL injection 8 mg (8 mg Intravenous Given 7/13/21 0352)   sodium chloride 0 9 % bolus 1,000 mL (0 mL Intravenous Stopped 7/13/21 55)     26-year-old male presenting with sudden onset severe left flank pain    Vital signs reviewed, afebrile, hypertensive, within normal limits otherwise  Differential diagnosis includes nephrolithiasis, AAA, aortic dissection, pyelonephritis, colitis, mesenteric ischemia, diverticulitis, versus another etiology of symptoms  Toradol, morphine, and Zofran administered for pain and nausea  Labs obtained, revealing CMP with creatinine of 1 08 and baseline, glucose of 186, lipase not consistent with acute pancreatitis  CBCs without leukocytosis, anemia, or thrombocytopenia  UA is pending  CT abdomen/pelvis to my review reveals mild hydronephrosis of left kidney with a kidney stone at the ureter vesicular junction  On re-evaluation, pain is improved  1 L normal saline is being administered  Flomax administered to promote stone expulsion  ED Course as of Jul 13 0615   Tue Jul 13, 2021   0455 2 mm obstructing stone in the left ureterovesicular junction  Mild left hydroureteronephrosis and associated obstructive uropathy, as described      Colonic diverticulosis without evidence of acute diverticulitis  Cholelithiasis without discrete evidence of acute cholecystitis      Subcentimeter pulmonary nodules, as described  Based on current Fleischner Society 2017 Guidelines on incidental pulmonary nodule, follow-up chest CT recommended in 12 months      Other findings as above  On re-evaluation, patient voided and his symptoms are now significantly improved  He has no evidence of urinary tract infection  At this time, patient is deemed safe to discharge to home  I discussed lab and imaging results findings with the patient  Since patient has no evidence of additional kidney stones on the scan, he may follow-up with urology as needed  Given pulmonary nodules, patient will require a repeat CT scan in 12 months  Given a gallstone, patient may benefit from follow-up with a general surgeon    Patient is to seek medical attention should he develop right upper quadrant abdominal pain     Patient discharged to home with recommendations for symptom control, return precautions, and plan for follow up  MDM  Number of Diagnoses or Management Options  Flank pain: new and requires workup  Nephrolithiasis: new and does not require workup     Amount and/or Complexity of Data Reviewed  Clinical lab tests: ordered and reviewed  Tests in the radiology section of CPT®: ordered and reviewed    Risk of Complications, Morbidity, and/or Mortality  Presenting problems: high  Diagnostic procedures: high  Management options: moderate    Patient Progress  Patient progress: resolved      CLINICAL IMPRESSION  Final diagnoses:   Flank pain   Nephrolithiasis       DISPOSITION  Time reflects when diagnosis was documented in both MDM as applicable and the Disposition within this note     Time User Action Codes Description Comment    7/13/2021  6:10 AM Debbie Ovalles Add [R10 9] Flank pain     7/13/2021  6:10 AM Debbie Ovalles Add [N20 0] Nephrolithiasis       ED Disposition     ED Disposition Condition Date/Time Comment    Discharge Stable Tue Jul 13, 2021  6:10 AM Dexter Manzo discharge to home/self care  Follow-up Information     Follow up With Specialties Details Why Contact Info Additional [address] [place],  Family Medicine Call in 1 day Emergency Room Follow-up [address]  [facility] [address]       [facility]  As needed [id number] [phone]  [address]  [facility], [address] [place] [address], [address] [place] [address]          [address]  Discharge Medication List as of 7/13/2021  6:15 AM      CONTINUE these medications which have NOT CHANGED    Details   atorvastatin (LIPITOR) 10 mg tablet Take 1 tablet (10 mg total) by mouth daily, Starting Mon 6/28/2021, Normal      CONTOUR NEXT TEST test strip USE DAILY AS DIRECTED, Normal      !!  Easy Touch Pen Needles 31G X 8 MM MISC USE AS DIRECTED WITH BASAGLAR PEN, Normal      insulin glargine (Lantus SoloStar) 100 units/mL injection pen Inject 15 units under the skin at bedtime, No Print      !! Insulin Pen Needle (B-D UF III MINI PEN NEEDLES) 31G X 5 MM MISC by Does not apply route 4 (four) times a day, Starting Wed 11/13/2019, Normal      linaGLIPtin (Tradjenta) 5 MG TABS Take 5 mg by mouth daily, Starting Mon 6/28/2021, Normal      losartan (COZAAR) 25 mg tablet Take 1 tablet (25 mg total) by mouth daily, Starting Mon 6/28/2021, Normal      metFORMIN (GLUCOPHAGE) 1000 MG tablet Take 1 tablet (1,000 mg total) by mouth 2 (two) times a day with meals, Starting Mon 6/28/2021, Normal       !! - Potential duplicate medications found  Please discuss with provider                Matt Brown MD  07/13/21 3152

## 2021-07-13 NOTE — Clinical Note
Rajattomteo Diamond was seen and treated in our emergency department on 7/13/2021  Diagnosis:     An Gilbertdelemr    He may return on this date: If you have any questions or concerns, please don't hesitate to call        Thu Suarez MD    ______________________________           _______________          _______________  Hospital Representative                              Date                                Time

## 2021-07-20 ENCOUNTER — VBI (OUTPATIENT)
Dept: ADMINISTRATIVE | Facility: OTHER | Age: 61
End: 2021-07-20

## 2021-07-28 DIAGNOSIS — Z79.4 TYPE 2 DIABETES MELLITUS WITH MICROALBUMINURIA, WITH LONG-TERM CURRENT USE OF INSULIN (HCC): ICD-10-CM

## 2021-07-28 DIAGNOSIS — E11.29 TYPE 2 DIABETES MELLITUS WITH MICROALBUMINURIA, WITH LONG-TERM CURRENT USE OF INSULIN (HCC): ICD-10-CM

## 2021-07-28 DIAGNOSIS — R80.9 TYPE 2 DIABETES MELLITUS WITH MICROALBUMINURIA, WITH LONG-TERM CURRENT USE OF INSULIN (HCC): ICD-10-CM

## 2021-07-28 DIAGNOSIS — Z79.4 TYPE 2 DIABETES MELLITUS WITH HYPERGLYCEMIA, WITH LONG-TERM CURRENT USE OF INSULIN (HCC): ICD-10-CM

## 2021-07-28 DIAGNOSIS — E11.65 TYPE 2 DIABETES MELLITUS WITH HYPERGLYCEMIA, WITH LONG-TERM CURRENT USE OF INSULIN (HCC): ICD-10-CM

## 2021-07-28 RX ORDER — INSULIN GLARGINE 100 [IU]/ML
INJECTION, SOLUTION SUBCUTANEOUS
Qty: 15 ML | Refills: 0 | Status: SHIPPED | OUTPATIENT
Start: 2021-07-28 | End: 2022-01-11

## 2021-07-28 RX ORDER — LOSARTAN POTASSIUM 25 MG/1
25 TABLET ORAL DAILY
Qty: 30 TABLET | Refills: 0 | Status: SHIPPED | OUTPATIENT
Start: 2021-07-28 | End: 2021-10-04

## 2021-08-06 ENCOUNTER — VBI (OUTPATIENT)
Dept: ADMINISTRATIVE | Facility: OTHER | Age: 61
End: 2021-08-06

## 2021-10-04 DIAGNOSIS — Z79.4 TYPE 2 DIABETES MELLITUS WITH MICROALBUMINURIA, WITH LONG-TERM CURRENT USE OF INSULIN (HCC): ICD-10-CM

## 2021-10-04 DIAGNOSIS — R80.9 TYPE 2 DIABETES MELLITUS WITH MICROALBUMINURIA, WITH LONG-TERM CURRENT USE OF INSULIN (HCC): ICD-10-CM

## 2021-10-04 DIAGNOSIS — E11.29 TYPE 2 DIABETES MELLITUS WITH MICROALBUMINURIA, WITH LONG-TERM CURRENT USE OF INSULIN (HCC): ICD-10-CM

## 2021-10-04 RX ORDER — LOSARTAN POTASSIUM 25 MG/1
25 TABLET ORAL DAILY
Qty: 30 TABLET | Refills: 5 | Status: SHIPPED | OUTPATIENT
Start: 2021-10-04 | End: 2022-05-02

## 2021-11-05 ENCOUNTER — APPOINTMENT (OUTPATIENT)
Dept: LAB | Facility: MEDICAL CENTER | Age: 61
End: 2021-11-05
Payer: COMMERCIAL

## 2021-11-05 DIAGNOSIS — E78.2 MIXED HYPERLIPIDEMIA: ICD-10-CM

## 2021-11-05 DIAGNOSIS — IMO0002 TYPE II DIABETES MELLITUS WITH COMA, UNCONTROLLED: Primary | ICD-10-CM

## 2021-11-05 DIAGNOSIS — E11.65 TYPE 2 DIABETES MELLITUS WITH HYPERGLYCEMIA, WITH LONG-TERM CURRENT USE OF INSULIN (HCC): ICD-10-CM

## 2021-11-05 DIAGNOSIS — Z79.4 TYPE 2 DIABETES MELLITUS WITH HYPERGLYCEMIA, WITH LONG-TERM CURRENT USE OF INSULIN (HCC): ICD-10-CM

## 2021-11-05 DIAGNOSIS — R80.9 MICROALBUMINURIA: ICD-10-CM

## 2021-11-05 LAB
ANION GAP SERPL CALCULATED.3IONS-SCNC: 2 MMOL/L (ref 4–13)
BUN SERPL-MCNC: 16 MG/DL (ref 5–25)
CALCIUM SERPL-MCNC: 9.2 MG/DL (ref 8.3–10.1)
CHLORIDE SERPL-SCNC: 110 MMOL/L (ref 100–108)
CHOLEST SERPL-MCNC: 149 MG/DL (ref 50–200)
CO2 SERPL-SCNC: 26 MMOL/L (ref 21–32)
CREAT SERPL-MCNC: 1.02 MG/DL (ref 0.6–1.3)
CREAT UR-MCNC: 228 MG/DL
EST. AVERAGE GLUCOSE BLD GHB EST-MCNC: 148 MG/DL
GFR SERPL CREATININE-BSD FRML MDRD: 80 ML/MIN/1.73SQ M
GLUCOSE P FAST SERPL-MCNC: 144 MG/DL (ref 65–99)
HBA1C MFR BLD: 6.8 %
HDLC SERPL-MCNC: 44 MG/DL
LDLC SERPL CALC-MCNC: 83 MG/DL (ref 0–100)
MICROALBUMIN UR-MCNC: 32.7 MG/L (ref 0–20)
MICROALBUMIN/CREAT 24H UR: 14 MG/G CREATININE (ref 0–30)
NONHDLC SERPL-MCNC: 105 MG/DL
POTASSIUM SERPL-SCNC: 4.7 MMOL/L (ref 3.5–5.3)
SODIUM SERPL-SCNC: 138 MMOL/L (ref 136–145)
TRIGL SERPL-MCNC: 110 MG/DL

## 2021-11-05 PROCEDURE — 82043 UR ALBUMIN QUANTITATIVE: CPT | Performed by: INTERNAL MEDICINE

## 2021-11-05 PROCEDURE — 82570 ASSAY OF URINE CREATININE: CPT | Performed by: INTERNAL MEDICINE

## 2021-11-05 PROCEDURE — 36415 COLL VENOUS BLD VENIPUNCTURE: CPT

## 2021-11-05 PROCEDURE — 80061 LIPID PANEL: CPT

## 2021-11-05 PROCEDURE — 83036 HEMOGLOBIN GLYCOSYLATED A1C: CPT

## 2021-11-05 PROCEDURE — 80048 BASIC METABOLIC PNL TOTAL CA: CPT

## 2021-11-10 ENCOUNTER — OFFICE VISIT (OUTPATIENT)
Dept: ENDOCRINOLOGY | Facility: CLINIC | Age: 61
End: 2021-11-10
Payer: COMMERCIAL

## 2021-11-10 VITALS
DIASTOLIC BLOOD PRESSURE: 70 MMHG | HEIGHT: 75 IN | TEMPERATURE: 97 F | BODY MASS INDEX: 35.31 KG/M2 | SYSTOLIC BLOOD PRESSURE: 110 MMHG | HEART RATE: 67 BPM | WEIGHT: 284 LBS

## 2021-11-10 DIAGNOSIS — R80.9 TYPE 2 DIABETES MELLITUS WITH MICROALBUMINURIA, WITH LONG-TERM CURRENT USE OF INSULIN (HCC): Primary | ICD-10-CM

## 2021-11-10 DIAGNOSIS — R80.9 MICROALBUMINURIA: ICD-10-CM

## 2021-11-10 DIAGNOSIS — E11.29 TYPE 2 DIABETES MELLITUS WITH MICROALBUMINURIA, WITH LONG-TERM CURRENT USE OF INSULIN (HCC): Primary | ICD-10-CM

## 2021-11-10 DIAGNOSIS — E66.9 OBESITY (BMI 30-39.9): ICD-10-CM

## 2021-11-10 DIAGNOSIS — Z79.4 TYPE 2 DIABETES MELLITUS WITH MICROALBUMINURIA, WITH LONG-TERM CURRENT USE OF INSULIN (HCC): Primary | ICD-10-CM

## 2021-11-10 DIAGNOSIS — E78.2 MIXED HYPERLIPIDEMIA: ICD-10-CM

## 2021-11-10 PROCEDURE — 99214 OFFICE O/P EST MOD 30 MIN: CPT | Performed by: INTERNAL MEDICINE

## 2022-01-10 DIAGNOSIS — E11.65 TYPE 2 DIABETES MELLITUS WITH HYPERGLYCEMIA, WITH LONG-TERM CURRENT USE OF INSULIN (HCC): ICD-10-CM

## 2022-01-10 DIAGNOSIS — Z79.4 TYPE 2 DIABETES MELLITUS WITH HYPERGLYCEMIA, WITH LONG-TERM CURRENT USE OF INSULIN (HCC): ICD-10-CM

## 2022-01-11 RX ORDER — INSULIN GLARGINE 100 [IU]/ML
16 INJECTION, SOLUTION SUBCUTANEOUS DAILY
Qty: 15 ML | Refills: 3 | Status: SHIPPED | OUTPATIENT
Start: 2022-01-11 | End: 2022-05-09 | Stop reason: SDUPTHER

## 2022-02-02 LAB
LEFT EYE DIABETIC RETINOPATHY: NORMAL
RIGHT EYE DIABETIC RETINOPATHY: NORMAL
SEVERITY (EYE EXAM): NORMAL

## 2022-02-16 ENCOUNTER — APPOINTMENT (OUTPATIENT)
Dept: LAB | Facility: MEDICAL CENTER | Age: 62
End: 2022-02-16
Payer: COMMERCIAL

## 2022-02-16 DIAGNOSIS — E78.2 MIXED HYPERLIPIDEMIA: ICD-10-CM

## 2022-02-16 DIAGNOSIS — E11.29 TYPE 2 DIABETES MELLITUS WITH MICROALBUMINURIA, WITH LONG-TERM CURRENT USE OF INSULIN (HCC): Primary | ICD-10-CM

## 2022-02-16 DIAGNOSIS — R80.9 TYPE 2 DIABETES MELLITUS WITH MICROALBUMINURIA, WITH LONG-TERM CURRENT USE OF INSULIN (HCC): Primary | ICD-10-CM

## 2022-02-16 DIAGNOSIS — Z79.4 TYPE 2 DIABETES MELLITUS WITH MICROALBUMINURIA, WITH LONG-TERM CURRENT USE OF INSULIN (HCC): Primary | ICD-10-CM

## 2022-02-16 LAB
ANION GAP SERPL CALCULATED.3IONS-SCNC: 3 MMOL/L (ref 4–13)
BUN SERPL-MCNC: 17 MG/DL (ref 5–25)
CALCIUM SERPL-MCNC: 9.3 MG/DL (ref 8.3–10.1)
CHLORIDE SERPL-SCNC: 107 MMOL/L (ref 100–108)
CO2 SERPL-SCNC: 28 MMOL/L (ref 21–32)
CREAT SERPL-MCNC: 1.15 MG/DL (ref 0.6–1.3)
EST. AVERAGE GLUCOSE BLD GHB EST-MCNC: 226 MG/DL
GFR SERPL CREATININE-BSD FRML MDRD: 68 ML/MIN/1.73SQ M
GLUCOSE P FAST SERPL-MCNC: 159 MG/DL (ref 65–99)
HBA1C MFR BLD: 9.5 %
POTASSIUM SERPL-SCNC: 4.6 MMOL/L (ref 3.5–5.3)
SODIUM SERPL-SCNC: 138 MMOL/L (ref 136–145)

## 2022-02-16 PROCEDURE — 83036 HEMOGLOBIN GLYCOSYLATED A1C: CPT

## 2022-02-16 PROCEDURE — 80048 BASIC METABOLIC PNL TOTAL CA: CPT

## 2022-02-16 PROCEDURE — 36415 COLL VENOUS BLD VENIPUNCTURE: CPT

## 2022-02-23 ENCOUNTER — TELEPHONE (OUTPATIENT)
Dept: ADMINISTRATIVE | Facility: OTHER | Age: 62
End: 2022-02-23

## 2022-02-23 NOTE — TELEPHONE ENCOUNTER
Upon review of the In Basket request we [facility] cannot update HM for items that are done internally  Endo provider would have to use DM Foot template to close HM  Endo doesn't do eye exams  If you know where patient is having their eye exams done, we can outreach to them  Any additional questions or concerns should be emailed to the Practice Liaisons via [email or web address] email, please do not reply via In Basket      Thank you  Melissa Lnyn MA

## 2022-02-23 NOTE — TELEPHONE ENCOUNTER
----- Message from Tanvir Joseph sent at 2/23/2022  9:32 AM EST -----  Regarding: Dm Eye/Foot  02/23/22 9:33 AM    Hello, our patient Alejandro Sanders has had Diabetic Eye Exam and Diabetic Foot Exam completed/performed  Please assist in updating the patient chart by making an External outreach to  Rhiannon Pritchett MD facility located in   [facility] [place] The date of service is unsure of dates      Thank you,  [facility] [address] [facility]

## 2022-02-24 ENCOUNTER — OFFICE VISIT (OUTPATIENT)
Dept: ENDOCRINOLOGY | Facility: CLINIC | Age: 62
End: 2022-02-24
Payer: COMMERCIAL

## 2022-02-24 VITALS
TEMPERATURE: 97 F | HEART RATE: 61 BPM | DIASTOLIC BLOOD PRESSURE: 80 MMHG | WEIGHT: 280 LBS | BODY MASS INDEX: 34.82 KG/M2 | SYSTOLIC BLOOD PRESSURE: 110 MMHG | HEIGHT: 75 IN

## 2022-02-24 DIAGNOSIS — I10 HYPERTENSION GOAL BP (BLOOD PRESSURE) < 140/90: ICD-10-CM

## 2022-02-24 DIAGNOSIS — E11.65 TYPE 2 DIABETES MELLITUS WITH HYPERGLYCEMIA, WITH LONG-TERM CURRENT USE OF INSULIN (HCC): Primary | ICD-10-CM

## 2022-02-24 DIAGNOSIS — R80.9 MICROALBUMINURIA: ICD-10-CM

## 2022-02-24 DIAGNOSIS — Z79.4 TYPE 2 DIABETES MELLITUS WITH HYPERGLYCEMIA, WITH LONG-TERM CURRENT USE OF INSULIN (HCC): Primary | ICD-10-CM

## 2022-02-24 DIAGNOSIS — E78.2 MIXED HYPERLIPIDEMIA: ICD-10-CM

## 2022-02-24 DIAGNOSIS — E66.9 OBESITY (BMI 30-39.9): ICD-10-CM

## 2022-02-24 PROCEDURE — 99214 OFFICE O/P EST MOD 30 MIN: CPT | Performed by: INTERNAL MEDICINE

## 2022-02-24 RX ORDER — INSULIN ASPART 100 [IU]/ML
INJECTION, SUSPENSION SUBCUTANEOUS
COMMUNITY

## 2022-02-24 NOTE — PATIENT INSTRUCTIONS
Continue current regimen   If you notice your blood sugars are trending up again, please let me know, send log for review   Repeat labs in 3 months

## 2022-02-24 NOTE — PROGRESS NOTES
Carol Cabral 62 y o  male MRN: [id number]    Encounter: [id number]      Assessment/Plan     1  Type 2 diabetes mellitus long-term insulin therapy with hyperglycemia   2  Obesity  Poorly controlled Last A1c 9 5% which has trended up   Recently, blood sugars have improved     Recommend the following at this time  Continue current regimen   Consistent carb diet, regular exercise   -repeat labs in 3 months prior to follow-up   Will check C-peptide level with next set of labs, if normal/higher, will consider glp 1 agonist in follow-up    3  Hyperlipidemia  - continue statin therapy    4  Hypertension  5  microabuminuria   Blood pressure at goal   - continue ACE-I/ ARB      CC: Diabetes    History of Present Illness     HPI:  Carol Cabral is a 62 y o  male presents for a follow-up visit regarding diabetes management  Last seen in 11/2021  Last Eye exam: January 2022- No Dr per history     Noticed Bg trending up approx 1 month ago with BG in the 300s, increased doses of insulin himself and started novolog from before   Has not needed novolog for the last 2 weeks  Denies having an illness, change in medications   admits to having been stressed  No change diet/ exercise   possible that the batch of insulin he was using was denatured? Until January had not been checking blood sugars regularly     Current regimen:   Lantus 24 units subcutaneously at bedtime   Metformin 1 gram twice a day   Tradjenta 5 milligrams orally daily     Statin:  Lipitor 10  ACE-I/ARB:  Losartan    Home glucose monitoring: log will be scanned, last 1 week  mg/dl   usually eats 3 meals , dinner is larger,  Breakfast/ lunch is lighter   Symptoms of hypoglycemia :  Cold sweat and pre-syncope     All other systems were reviewed and are negative      Review of Systems      Historical Information   Past Medical History:   Diagnosis Date    Diabetes mellitus ([address] )      Past Surgical History:   Procedure Laterality Date    HERNIA REPAIR Social History   Social History     Substance and Sexual Activity   Alcohol Use Yes    Comment: ocassional     Social History     Substance and Sexual Activity   Drug Use Never     Social History     Tobacco Use   Smoking Status Never Smoker   Smokeless Tobacco Never Used     Family History:   Family History   Problem Relation Age of Onset    No Known Problems Mother     Cancer Father        Meds/Allergies   Current Outpatient Medications   Medication Sig Dispense Refill    atorvastatin (LIPITOR) 10 mg tablet Take 1 tablet (10 mg total) by mouth daily 90 tablet 3    CONTOUR NEXT TEST test strip USE DAILY AS DIRECTED (Patient taking differently: No sig reported) 50 each 3    Easy Touch Pen Needles 31G X 8 MM MISC USE AS DIRECTED WITH BASAGLAR PEN 30 each 0    insulin aspart (NovoLOG) 100 units/mL injection Inject 10 Units under the skin 10 units before meals      insulin aspart protamine-insulin aspart (NovoLOG 70/30) 100 units/mL injection Inject under the skin 2 (two) times a day before meals      insulin glargine (Lantus SoloStar) 100 units/mL injection pen Inject 16 Units under the skin daily Inject 16 Units under the skin daily (Patient taking differently: Inject 24 Units under the skin daily Inject 16 Units under the skin daily ) 15 mL 3    Insulin Pen Needle (B-D UF III MINI PEN NEEDLES) 31G X 5 MM MISC by Does not apply route 4 (four) times a day 400 each 3    linaGLIPtin (Tradjenta) 5 MG TABS Take 5 mg by mouth daily 90 tablet 3    losartan (COZAAR) 25 mg tablet Take 1 tablet (25 mg total) by mouth daily 30 tablet 5    metFORMIN (GLUCOPHAGE) 1000 MG tablet Take 1 tablet (1,000 mg total) by mouth 2 (two) times a day with meals 180 tablet 3     No current facility-administered medications for this visit       Allergies   Allergen Reactions    Jardiance [Empagliflozin]      Ketoacidosis       Objective   Vitals: Blood pressure 110/80, pulse 61, temperature (!) 97 °F (36 1 °C), height 6' 3" (1 905 m), weight 127 kg (280 lb)  Physical Exam  Constitutional:       General: He is not in acute distress  Appearance: He is well-developed  He is not diaphoretic  HENT:      Head: Normocephalic and atraumatic  Eyes:      Conjunctiva/sclera: Conjunctivae normal       Pupils: Pupils are equal, round, and reactive to light  Cardiovascular:      Rate and Rhythm: Normal rate and regular rhythm  Pulses: no weak pulses          Dorsalis pedis pulses are 2+ on the right side and 2+ on the left side  Heart sounds: Normal heart sounds  No murmur heard  Pulmonary:      Effort: Pulmonary effort is normal  No respiratory distress  Breath sounds: Normal breath sounds  No wheezing  Abdominal:      General: There is no distension  Palpations: Abdomen is soft  Tenderness: There is no abdominal tenderness  There is no guarding  Musculoskeletal:      Cervical back: Normal range of motion and neck supple  Feet:      Right foot:      Skin integrity: No ulcer, skin breakdown, erythema, warmth, callus or dry skin  Left foot:      Skin integrity: No ulcer, skin breakdown, erythema, warmth, callus or dry skin  Comments: Foot exam:   no cuts or breaks in the skin  DP 2+ bilaterally   Sensations intact to monofilament testing bilaterally    Skin:     General: Skin is warm and dry  Findings: No erythema or rash  Neurological:      Mental Status: He is alert and oriented to person, place, and time  Psychiatric:         Behavior: Behavior normal          Thought Content: Thought content normal      Diabetic Foot Exam    Patient's shoes and socks removed  Right Foot/Ankle   Right Foot Inspection  Skin Exam: skin normal and skin intact  No dry skin, no warmth, no callus, no erythema, no maceration, no abnormal color, no pre-ulcer, no ulcer and no callus       Sensory   Vibration: intact  Proprioception: intact  Monofilament testing: intact    Vascular  Capillary refills: < 3 seconds  The right DP pulse is 2+  Left Foot/Ankle  Left Foot Inspection  Skin Exam: skin normal and skin intact  No dry skin, no warmth, no erythema, no maceration, normal color, no pre-ulcer, no ulcer and no callus  Sensory   Vibration: intact  Proprioception: intact  Monofilament testing: intact    Vascular  Capillary refills: < 3 seconds  The left DP pulse is 2+  Assign Risk Category  No deformity present  No loss of protective sensation  No weak pulses  Risk: 0    The history was obtained from the review of the chart, patient  Lab Results:   Lab Results   Component Value Date/Time    Hemoglobin A1C 9 5 (H) 02/16/2022 07:00 AM    Hemoglobin A1C 6 8 (H) 11/05/2021 07:08 AM    Hemoglobin A1C 7 0 (H) 04/15/2021 07:17 AM    WBC 9 64 07/13/2021 02:58 AM    Hemoglobin 13 9 07/13/2021 02:58 AM    Hematocrit 42 1 07/13/2021 02:58 AM    MCV 86 07/13/2021 02:58 AM    Platelets 605 56/55/3897 02:58 AM    BUN 17 02/16/2022 07:00 AM    BUN 16 11/05/2021 07:08 AM    BUN 19 07/13/2021 02:58 AM    Potassium 4 6 02/16/2022 07:00 AM    Potassium 4 7 11/05/2021 07:08 AM    Potassium 4 0 07/13/2021 02:58 AM    Chloride 107 02/16/2022 07:00 AM    Chloride 110 (H) 11/05/2021 07:08 AM    Chloride 107 07/13/2021 02:58 AM    CO2 28 02/16/2022 07:00 AM    CO2 26 11/05/2021 07:08 AM    CO2 29 07/13/2021 02:58 AM    Creatinine 1 15 02/16/2022 07:00 AM    Creatinine 1 02 11/05/2021 07:08 AM    Creatinine 1 08 07/13/2021 02:58 AM    AST 14 07/13/2021 02:58 AM    AST 15 04/15/2021 07:17 AM    ALT 34 07/13/2021 02:58 AM    ALT 33 04/15/2021 07:17 AM    Albumin 3 6 07/13/2021 02:58 AM    Albumin 3 8 04/15/2021 07:17 AM    HDL, Direct 44 11/05/2021 07:08 AM    HDL, Direct 51 04/15/2021 07:17 AM    Triglycerides 110 11/05/2021 07:08 AM    Triglycerides 142 04/15/2021 07:17 AM         Imaging Studies: I have personally reviewed pertinent reports  Portions of the record may have been created with voice recognition software  Occasional wrong word or "sound a like" substitutions may have occurred due to the inherent limitations of voice recognition software  Read the chart carefully and recognize, using context, where substitutions have occurred

## 2022-04-28 ENCOUNTER — APPOINTMENT (OUTPATIENT)
Dept: LAB | Facility: MEDICAL CENTER | Age: 62
End: 2022-04-28
Payer: COMMERCIAL

## 2022-04-28 DIAGNOSIS — Z79.4 TYPE 2 DIABETES MELLITUS WITH HYPERGLYCEMIA, WITH LONG-TERM CURRENT USE OF INSULIN (HCC): Primary | ICD-10-CM

## 2022-04-28 DIAGNOSIS — R80.9 MICROALBUMINURIA: ICD-10-CM

## 2022-04-28 DIAGNOSIS — E78.2 MIXED HYPERLIPIDEMIA: ICD-10-CM

## 2022-04-28 DIAGNOSIS — E11.65 TYPE 2 DIABETES MELLITUS WITH HYPERGLYCEMIA, WITH LONG-TERM CURRENT USE OF INSULIN (HCC): Primary | ICD-10-CM

## 2022-04-28 LAB
ALBUMIN SERPL BCP-MCNC: 3.5 G/DL (ref 3.5–5)
ALP SERPL-CCNC: 70 U/L (ref 46–116)
ALT SERPL W P-5'-P-CCNC: 29 U/L (ref 12–78)
ANION GAP SERPL CALCULATED.3IONS-SCNC: 3 MMOL/L (ref 4–13)
AST SERPL W P-5'-P-CCNC: 13 U/L (ref 5–45)
BILIRUB SERPL-MCNC: 0.58 MG/DL (ref 0.2–1)
BUN SERPL-MCNC: 13 MG/DL (ref 5–25)
CALCIUM SERPL-MCNC: 9.3 MG/DL (ref 8.3–10.1)
CHLORIDE SERPL-SCNC: 109 MMOL/L (ref 100–108)
CHOLEST SERPL-MCNC: 156 MG/DL
CO2 SERPL-SCNC: 28 MMOL/L (ref 21–32)
CREAT SERPL-MCNC: 1 MG/DL (ref 0.6–1.3)
EST. AVERAGE GLUCOSE BLD GHB EST-MCNC: 192 MG/DL
GFR SERPL CREATININE-BSD FRML MDRD: 81 ML/MIN/1.73SQ M
GLUCOSE P FAST SERPL-MCNC: 179 MG/DL (ref 65–99)
HBA1C MFR BLD: 8.3 %
HDLC SERPL-MCNC: 42 MG/DL
LDLC SERPL CALC-MCNC: 88 MG/DL (ref 0–100)
NONHDLC SERPL-MCNC: 114 MG/DL
POTASSIUM SERPL-SCNC: 4.5 MMOL/L (ref 3.5–5.3)
PROT SERPL-MCNC: 7.1 G/DL (ref 6.4–8.2)
SODIUM SERPL-SCNC: 140 MMOL/L (ref 136–145)
TRIGL SERPL-MCNC: 129 MG/DL

## 2022-04-28 PROCEDURE — 80061 LIPID PANEL: CPT

## 2022-04-28 PROCEDURE — 84681 ASSAY OF C-PEPTIDE: CPT

## 2022-04-28 PROCEDURE — 83036 HEMOGLOBIN GLYCOSYLATED A1C: CPT

## 2022-04-28 PROCEDURE — 36415 COLL VENOUS BLD VENIPUNCTURE: CPT

## 2022-04-28 PROCEDURE — 80053 COMPREHEN METABOLIC PANEL: CPT

## 2022-04-29 DIAGNOSIS — R80.9 TYPE 2 DIABETES MELLITUS WITH MICROALBUMINURIA, WITH LONG-TERM CURRENT USE OF INSULIN (HCC): ICD-10-CM

## 2022-04-29 DIAGNOSIS — E11.29 TYPE 2 DIABETES MELLITUS WITH MICROALBUMINURIA, WITH LONG-TERM CURRENT USE OF INSULIN (HCC): ICD-10-CM

## 2022-04-29 DIAGNOSIS — Z79.4 TYPE 2 DIABETES MELLITUS WITH MICROALBUMINURIA, WITH LONG-TERM CURRENT USE OF INSULIN (HCC): ICD-10-CM

## 2022-04-29 LAB — C PEPTIDE SERPL-MCNC: 2.5 NG/ML (ref 1.1–4.4)

## 2022-05-02 RX ORDER — LOSARTAN POTASSIUM 25 MG/1
25 TABLET ORAL DAILY
Qty: 30 TABLET | Refills: 4 | Status: SHIPPED | OUTPATIENT
Start: 2022-05-02

## 2022-05-06 ENCOUNTER — TELEPHONE (OUTPATIENT)
Dept: ENDOCRINOLOGY | Facility: CLINIC | Age: 62
End: 2022-05-06

## 2022-05-09 ENCOUNTER — OFFICE VISIT (OUTPATIENT)
Dept: ENDOCRINOLOGY | Facility: CLINIC | Age: 62
End: 2022-05-09
Payer: COMMERCIAL

## 2022-05-09 VITALS
WEIGHT: 284 LBS | HEIGHT: 75 IN | HEART RATE: 66 BPM | BODY MASS INDEX: 35.31 KG/M2 | DIASTOLIC BLOOD PRESSURE: 78 MMHG | SYSTOLIC BLOOD PRESSURE: 124 MMHG

## 2022-05-09 DIAGNOSIS — E78.2 MIXED HYPERLIPIDEMIA: ICD-10-CM

## 2022-05-09 DIAGNOSIS — Z79.4 TYPE 2 DIABETES MELLITUS WITH HYPERGLYCEMIA, WITH LONG-TERM CURRENT USE OF INSULIN (HCC): Primary | ICD-10-CM

## 2022-05-09 DIAGNOSIS — I10 HYPERTENSION GOAL BP (BLOOD PRESSURE) < 140/90: ICD-10-CM

## 2022-05-09 DIAGNOSIS — E11.65 TYPE 2 DIABETES MELLITUS WITH HYPERGLYCEMIA, WITH LONG-TERM CURRENT USE OF INSULIN (HCC): Primary | ICD-10-CM

## 2022-05-09 DIAGNOSIS — E66.9 OBESITY (BMI 30-39.9): ICD-10-CM

## 2022-05-09 PROCEDURE — 99214 OFFICE O/P EST MOD 30 MIN: CPT | Performed by: STUDENT IN AN ORGANIZED HEALTH CARE EDUCATION/TRAINING PROGRAM

## 2022-05-09 RX ORDER — DULAGLUTIDE 0.75 MG/.5ML
0.75 INJECTION, SOLUTION SUBCUTANEOUS WEEKLY
Qty: 2 ML | Refills: 3 | Status: SHIPPED | OUTPATIENT
Start: 2022-05-09

## 2022-05-09 RX ORDER — INSULIN GLARGINE 100 [IU]/ML
28 INJECTION, SOLUTION SUBCUTANEOUS DAILY
Qty: 15 ML | Refills: 3
Start: 2022-05-09

## 2022-05-09 NOTE — PATIENT INSTRUCTIONS
Adjust Lantus 28 units nightly and adjust by 2-4 units every 3-4 days for fasting blood sugar goal of   You are being started on a GLP1 agonist injection    Common side effects: nausea, diarrhea, vomiting, decreased appetite, indigestion, and constipation  Rare risks include pancreatitis and medullary thyroid cancer  Call if severe nausea or new abdominal pain  Call if Blood sugar <70 or >250 on three or more occasions    Stop using and go to the emergency room if you develop sudden and severe stomach pain, nausea, vomiting, fever, lightheadedness  If you do experience nausea after starting the Trulicity, here are some recommendations:  -Eat smaller meals  Stop eating when you feel full   -Eat bland, low-fat foods, like crackers, toast, and rice  -Eat foods that contain water, like soups and gelatin  -Avoid lying down after you eat  -Avoid fried or fatty foods    Check sugars 2x daily    Before meals, bedtime    Goal Blood Sugars:   Premeal , even better <110  2hr after a meal <180, even better <140  A1C <7%, even better <6 5%      Aim for 45g carbohydrates with meals  15-20g with snacks    Goal exercise is 30 minutes daily, most days of the week    Please have labs done before next visit    Bring your meter or logbook with you each visit    Return appointment 3 months

## 2022-05-09 NOTE — PROGRESS NOTES
Ara Klein 65 y o  male MRN: [id number]    Encounter: [id number]      Assessment/Plan     Problem List Items Addressed This Visit        Endocrine    Type 2 diabetes mellitus with hyperglycemia, with long-term current use of insulin ([address] ) - Primary     Diabetes is uncontrolled, but improving by A1c  Recommend increasing lantus to 28 units daily for FBG goal   Will also stop tradjenta and start GLP1  I reviewed the benefits and side effects of the GLP class of drugs  I provided him some anticipatory guidance for some of the more common side effects, including nausea and upset stomach  He has no contraindications for its use  Recommend continuing BID fingersticks and can send log for my review  Relevant Medications    Dulaglutide (Trulicity) 9 66 PM/4 9CC SOPN    insulin glargine (Lantus SoloStar) 100 units/mL injection pen    Other Relevant Orders    Basic metabolic panel Lab Collect    HEMOGLOBIN A1C W/ EAG ESTIMATION Lab Collect       Cardiovascular and Mediastinum    Hypertension goal BP (blood pressure) < 140/90     Stable  Continue current therapy            Other    Mixed hyperlipidemia     Continue statin  Obesity (BMI 30-39  9)        CC: Diabetes    History of Present Illness     HPI:    Here today for follow up of diabetes  Presently, Moises Jenkins takes lantus 24 units daily, metformin 1000 mg bid, and linagliptin 5 mg daily  He does not have meter or log today  He reports FBG typically between 130-170  Blood sugars before bedtime are <200  He denies any hypoglycemia  He denies hyperglycemic symptoms  He states he doesn't eat regularly  He denies personal history of pancreatitis or family history of MTC/MEN  For hyperlipidemia he takes lipitor 10 mg daily  For hypertension he takes losartan  Review of Systems   Constitutional: Negative for diaphoresis and unexpected weight change  HENT: Negative for trouble swallowing and voice change      Eyes: Negative for photophobia and visual disturbance  Respiratory: Negative for choking and shortness of breath  Gastrointestinal: Negative for abdominal pain, nausea and vomiting  Endocrine: Negative for polydipsia, polyphagia and polyuria  Neurological: Negative for tremors and weakness  Psychiatric/Behavioral: Negative for agitation and behavioral problems         Historical Information   Past Medical History:   Diagnosis Date    Diabetes mellitus ([facility] [address] )      Past Surgical History:   Procedure Laterality Date    HERNIA REPAIR       Social History   Social History     Substance and Sexual Activity   Alcohol Use Yes    Comment: ocassional     Social History     Substance and Sexual Activity   Drug Use Never     Social History     Tobacco Use   Smoking Status Never Smoker   Smokeless Tobacco Never Used     Family History:   Family History   Problem Relation Age of Onset    No Known Problems Mother     Cancer Father        Meds/Allergies   Current Outpatient Medications   Medication Sig Dispense Refill    atorvastatin (LIPITOR) 10 mg tablet Take 1 tablet (10 mg total) by mouth daily 90 tablet 3    CONTOUR NEXT TEST test strip USE DAILY AS DIRECTED (Patient taking differently: No sig reported) 50 each 3    Easy Touch Pen Needles 31G X 8 MM MISC USE AS DIRECTED WITH BASAGLAR PEN 30 each 0    insulin glargine (Lantus SoloStar) 100 units/mL injection pen Inject 28 Units under the skin daily Inject 16 Units under the skin daily 15 mL 3    Insulin Pen Needle (B-D UF III MINI PEN NEEDLES) 31G X 5 MM MISC by Does not apply route 4 (four) times a day 400 each 3    losartan (COZAAR) 25 mg tablet Take 1 tablet (25 mg total) by mouth daily 30 tablet 4    metFORMIN (GLUCOPHAGE) 1000 MG tablet Take 1 tablet (1,000 mg total) by mouth 2 (two) times a day with meals 180 tablet 3    Dulaglutide (Trulicity) 8 46 YM/7 5JM SOPN Inject 0 5 mL (0 75 mg total) under the skin once a week 2 mL 3    insulin aspart (NovoLOG) 100 units/mL injection Inject 10 Units under the skin 10 units before meals (Patient not taking: Reported on 5/9/2022 )      insulin aspart protamine-insulin aspart (NovoLOG 70/30) 100 units/mL injection Inject under the skin 2 (two) times a day before meals (Patient not taking: Reported on 5/9/2022 )       No current facility-administered medications for this visit  Allergies   Allergen Reactions    Jardiance [Empagliflozin]      Ketoacidosis       Objective   Vitals: Blood pressure 124/78, pulse 66, height 6' 3" (1 905 m), weight 129 kg (284 lb)  Physical Exam  Vitals reviewed  Constitutional:       Appearance: Normal appearance  HENT:      Head: Normocephalic and atraumatic  Eyes:      General: No scleral icterus  Conjunctiva/sclera: Conjunctivae normal    Cardiovascular:      Rate and Rhythm: Normal rate and regular rhythm  Pulmonary:      Effort: Pulmonary effort is normal  No respiratory distress  Abdominal:      Palpations: Abdomen is soft  Tenderness: There is no abdominal tenderness  Musculoskeletal:      Cervical back: Normal range of motion  Skin:     General: Skin is warm and dry  Neurological:      General: No focal deficit present  Mental Status: He is alert  Psychiatric:         Mood and Affect: Mood normal          Behavior: Behavior normal          The history was obtained from the review of the chart, patient      Lab Results:   Lab Results   Component Value Date/Time    Hemoglobin A1C 8 3 (H) 04/28/2022 07:05 AM    Hemoglobin A1C 9 5 (H) 02/16/2022 07:00 AM    Hemoglobin A1C 6 8 (H) 11/05/2021 07:08 AM    WBC 9 64 07/13/2021 02:58 AM    Hemoglobin 13 9 07/13/2021 02:58 AM    Hematocrit 42 1 07/13/2021 02:58 AM    MCV 86 07/13/2021 02:58 AM    Platelets 434 92/02/2327 02:58 AM    BUN 13 04/28/2022 07:04 AM    BUN 17 02/16/2022 07:00 AM    BUN 16 11/05/2021 07:08 AM    Potassium 4 5 04/28/2022 07:04 AM    Potassium 4 6 02/16/2022 07:00 AM    Potassium 4 7 11/05/2021 07:08 AM    Chloride 109 (H) 04/28/2022 07:04 AM    Chloride 107 02/16/2022 07:00 AM    Chloride 110 (H) 11/05/2021 07:08 AM    CO2 28 04/28/2022 07:04 AM    CO2 28 02/16/2022 07:00 AM    CO2 26 11/05/2021 07:08 AM    Creatinine 1 00 04/28/2022 07:04 AM    Creatinine 1 15 02/16/2022 07:00 AM    Creatinine 1 02 11/05/2021 07:08 AM    AST 13 04/28/2022 07:04 AM    AST 14 07/13/2021 02:58 AM    ALT 29 04/28/2022 07:04 AM    ALT 34 07/13/2021 02:58 AM    Albumin 3 5 04/28/2022 07:04 AM    Albumin 3 6 07/13/2021 02:58 AM    HDL, Direct 42 04/28/2022 07:04 AM    HDL, Direct 44 11/05/2021 07:08 AM    Triglycerides 129 04/28/2022 07:04 AM    Triglycerides 110 11/05/2021 07:08 AM       Component      Latest Ref Rng & Units 4/28/2022   Sodium      136 - 145 mmol/L 140   Potassium      3 5 - 5 3 mmol/L 4 5   Chloride      100 - 108 mmol/L 109 (H)   CO2      21 - 32 mmol/L 28   Anion Gap      4 - 13 mmol/L 3 (L)   BUN      5 - 25 mg/dL 13   Creatinine      0 60 - 1 30 mg/dL 1 00   GLUCOSE FASTING      65 - 99 mg/dL 179 (H)   Calcium      8 3 - 10 1 mg/dL 9 3   AST      5 - 45 U/L 13   ALT      12 - 78 U/L 29   Alkaline Phosphatase      46 - 116 U/L 70   Total Protein      6 4 - 8 2 g/dL 7 1   Albumin      3 5 - 5 0 g/dL 3 5   TOTAL BILIRUBIN      0 20 - 1 00 mg/dL 0 58   eGFR      ml/min/1 73sq m 81   Cholesterol      See Comment mg/dL 156   Triglycerides      See Comment mg/dL 129   HDL      >=40 mg/dL 42   LDL Calculated      0 - 100 mg/dL 88   Non-HDL Cholesterol      mg/dl 114   Hemoglobin A1C      Normal 3 8-5 6%; PreDiabetic 5 7-6 4%; Diabetic >=6 5%; Glycemic control for adults with diabetes <7 0% % 8 3 (H)   eAG, EST AVG Glucose      mg/dl 192   C-PEPTIDE      1 1 - 4 4 ng/mL 2 5       Imaging Studies: I have personally reviewed pertinent reports  Portions of the record may have been created with voice recognition software   Occasional wrong word or "sound a like" substitutions may have occurred due to the inherent limitations of voice recognition software  Read the chart carefully and recognize, using context, where substitutions have occurred

## 2022-05-09 NOTE — ASSESSMENT & PLAN NOTE
Diabetes is uncontrolled, but improving by A1c  Recommend increasing lantus to 28 units daily for FBG goal   Will also stop tradjenta and start GLP1  I reviewed the benefits and side effects of the GLP class of drugs  I provided him some anticipatory guidance for some of the more common side effects, including nausea and upset stomach  He has no contraindications for its use  Recommend continuing BID fingersticks and can send log for my review

## 2022-05-23 ENCOUNTER — TELEPHONE (OUTPATIENT)
Dept: FAMILY MEDICINE CLINIC | Facility: CLINIC | Age: 62
End: 2022-05-23

## 2022-05-23 DIAGNOSIS — R91.8 LUNG NODULES: Primary | ICD-10-CM

## 2022-05-23 NOTE — TELEPHONE ENCOUNTER
Received message from radiology to review his CT scan from July of 2021  It did show some pulmonary nodules that need to be followed up on    I put in a order for a CT scan of his chest to re-evaluate

## 2022-07-25 DIAGNOSIS — E11.29 TYPE 2 DIABETES MELLITUS WITH MICROALBUMINURIA, WITH LONG-TERM CURRENT USE OF INSULIN (HCC): ICD-10-CM

## 2022-07-25 DIAGNOSIS — R80.9 TYPE 2 DIABETES MELLITUS WITH MICROALBUMINURIA, WITH LONG-TERM CURRENT USE OF INSULIN (HCC): ICD-10-CM

## 2022-07-25 DIAGNOSIS — Z79.4 TYPE 2 DIABETES MELLITUS WITH MICROALBUMINURIA, WITH LONG-TERM CURRENT USE OF INSULIN (HCC): ICD-10-CM

## 2022-07-26 RX ORDER — ATORVASTATIN CALCIUM 10 MG/1
10 TABLET, FILM COATED ORAL DAILY
Qty: 90 TABLET | Refills: 3 | Status: SHIPPED | OUTPATIENT
Start: 2022-07-26

## 2022-08-12 DIAGNOSIS — Z79.4 TYPE 2 DIABETES MELLITUS WITH HYPERGLYCEMIA, WITH LONG-TERM CURRENT USE OF INSULIN (HCC): ICD-10-CM

## 2022-08-12 DIAGNOSIS — E78.2 MIXED HYPERLIPIDEMIA: ICD-10-CM

## 2022-08-12 DIAGNOSIS — E11.65 TYPE 2 DIABETES MELLITUS WITH HYPERGLYCEMIA, WITH LONG-TERM CURRENT USE OF INSULIN (HCC): ICD-10-CM

## 2022-08-12 RX ORDER — INSULIN GLARGINE 100 [IU]/ML
INJECTION, SOLUTION SUBCUTANEOUS
Qty: 15 ML | Refills: 4 | Status: SHIPPED | OUTPATIENT
Start: 2022-08-12 | End: 2022-08-24 | Stop reason: SDUPTHER

## 2022-08-18 ENCOUNTER — APPOINTMENT (OUTPATIENT)
Dept: LAB | Facility: MEDICAL CENTER | Age: 62
End: 2022-08-18
Payer: COMMERCIAL

## 2022-08-18 DIAGNOSIS — Z79.4 TYPE 2 DIABETES MELLITUS WITH HYPERGLYCEMIA, WITH LONG-TERM CURRENT USE OF INSULIN (HCC): ICD-10-CM

## 2022-08-18 DIAGNOSIS — E11.65 TYPE 2 DIABETES MELLITUS WITH HYPERGLYCEMIA, WITH LONG-TERM CURRENT USE OF INSULIN (HCC): ICD-10-CM

## 2022-08-18 LAB
ANION GAP SERPL CALCULATED.3IONS-SCNC: 5 MMOL/L (ref 4–13)
BUN SERPL-MCNC: 14 MG/DL (ref 5–25)
CALCIUM SERPL-MCNC: 8.9 MG/DL (ref 8.3–10.1)
CHLORIDE SERPL-SCNC: 109 MMOL/L (ref 96–108)
CO2 SERPL-SCNC: 26 MMOL/L (ref 21–32)
CREAT SERPL-MCNC: 1.1 MG/DL (ref 0.6–1.3)
EST. AVERAGE GLUCOSE BLD GHB EST-MCNC: 154 MG/DL
GFR SERPL CREATININE-BSD FRML MDRD: 72 ML/MIN/1.73SQ M
GLUCOSE P FAST SERPL-MCNC: 123 MG/DL (ref 65–99)
HBA1C MFR BLD: 7 %
POTASSIUM SERPL-SCNC: 4.2 MMOL/L (ref 3.5–5.3)
SODIUM SERPL-SCNC: 140 MMOL/L (ref 135–147)

## 2022-08-18 PROCEDURE — 36415 COLL VENOUS BLD VENIPUNCTURE: CPT

## 2022-08-18 PROCEDURE — 83036 HEMOGLOBIN GLYCOSYLATED A1C: CPT

## 2022-08-18 PROCEDURE — 80048 BASIC METABOLIC PNL TOTAL CA: CPT

## 2022-08-24 ENCOUNTER — TELEPHONE (OUTPATIENT)
Dept: ADMINISTRATIVE | Facility: OTHER | Age: 62
End: 2022-08-24

## 2022-08-24 ENCOUNTER — OFFICE VISIT (OUTPATIENT)
Dept: ENDOCRINOLOGY | Facility: CLINIC | Age: 62
End: 2022-08-24
Payer: COMMERCIAL

## 2022-08-24 VITALS
BODY MASS INDEX: 34.71 KG/M2 | SYSTOLIC BLOOD PRESSURE: 110 MMHG | DIASTOLIC BLOOD PRESSURE: 60 MMHG | HEIGHT: 75 IN | HEART RATE: 64 BPM | WEIGHT: 279.2 LBS

## 2022-08-24 DIAGNOSIS — E11.65 TYPE 2 DIABETES MELLITUS WITH HYPERGLYCEMIA, WITH LONG-TERM CURRENT USE OF INSULIN (HCC): Primary | ICD-10-CM

## 2022-08-24 DIAGNOSIS — E66.9 OBESITY (BMI 30-39.9): ICD-10-CM

## 2022-08-24 DIAGNOSIS — I10 HYPERTENSION GOAL BP (BLOOD PRESSURE) < 140/90: ICD-10-CM

## 2022-08-24 DIAGNOSIS — Z79.4 TYPE 2 DIABETES MELLITUS WITH HYPERGLYCEMIA, WITH LONG-TERM CURRENT USE OF INSULIN (HCC): Primary | ICD-10-CM

## 2022-08-24 DIAGNOSIS — E78.2 MIXED HYPERLIPIDEMIA: ICD-10-CM

## 2022-08-24 PROCEDURE — 99214 OFFICE O/P EST MOD 30 MIN: CPT | Performed by: STUDENT IN AN ORGANIZED HEALTH CARE EDUCATION/TRAINING PROGRAM

## 2022-08-24 RX ORDER — DULAGLUTIDE 1.5 MG/.5ML
1.5 INJECTION, SOLUTION SUBCUTANEOUS WEEKLY
Qty: 2 ML | Refills: 3 | Status: SHIPPED | OUTPATIENT
Start: 2022-08-24

## 2022-08-24 RX ORDER — INSULIN GLARGINE 100 [IU]/ML
26 INJECTION, SOLUTION SUBCUTANEOUS WEEKLY
Qty: 15 ML | Refills: 4
Start: 2022-08-24

## 2022-08-24 NOTE — TELEPHONE ENCOUNTER
Upon review of the In Basket request and the patient's chart, initial outreach has been made via fax, please see Contacts section for details       Thank you  Myles Agarwal MA

## 2022-08-24 NOTE — PATIENT INSTRUCTIONS
Check sugars 3x daily    Before meals, bedtime    Goal Blood Sugars:   Premeal , even better <110  2hr after a meal <180, even better <140  A1C <7%, even better <6 5%      Aim for 45g carbohydrates with meals  15-20g with snacks    Goal exercise is 30 minutes daily, most days of the week    Please have labs done before next visit    Bring your meter or logbook with you each visit    Return appointment 4 months

## 2022-08-24 NOTE — ASSESSMENT & PLAN NOTE
Diabetes is improving  Today, we discussed increasing trulicity 1 5 mg weekly and decreasing lantus 26 units nightly  Eliecer Pitt will continue his present dosing of metformin 1000 mg bid  He is recommended to continue SMBG monitoring 1-2x daily at scattering times  We will plan to follow up his diabetes labs before next visit

## 2022-08-24 NOTE — PROGRESS NOTES
Sung Hernandez 65 y o  male MRN: [id number]    Encounter: [id number]      Assessment/Plan     Problem List Items Addressed This Visit        Endocrine    Type 2 diabetes mellitus with hyperglycemia, with long-term current use of insulin ([address] ) - Primary     Diabetes is improving  Today, we discussed increasing trulicity 1 5 mg weekly and decreasing lantus 26 units nightly  Pema Almeida will continue his present dosing of metformin 1000 mg bid  He is recommended to continue SMBG monitoring 1-2x daily at scattering times  We will plan to follow up his diabetes labs before next visit  Relevant Medications    Dulaglutide (Trulicity) 1 5 VO/6 2PG SOPN    Insulin Glargine Solostar (Lantus SoloStar) 100 UNIT/ML SOPN    Other Relevant Orders    Basic metabolic panel Lab Collect    HEMOGLOBIN A1C W/ EAG ESTIMATION Lab Collect    Microalbumin / creatinine urine ratio Lab Collect       Cardiovascular and Mediastinum    Hypertension goal BP (blood pressure) < 140/90     Well controlled  Continue present therapy            Other    Mixed hyperlipidemia     Stable  Continue statin  Obesity (BMI 30-39  9)     Enjoying weight loss  Will increase trulicity 1 5 mg weekly  CC: Diabetes    History of Present Illness     HPI:    Pema Almeida returns today for follow-up of diabetes  For diabetes he is taking Lantus 30 units nightly, metformin 1000 mg b i d  and Trulicity 3 31 mg weekly  He reports tolerating Trulicity without any incident  He has been noting improvement of his blood sugars and has also enjoyed a certain amount of weight loss  He states he has been checking his blood sugars more frequently prior to this visit but accidentally misplaced his blood sugar log prior to coming to the office  He states that his fasting blood sugars are routinely in the  range and his pre lunch the same  Lowest blood sugars been 76  He denies any significant hypoglycemia    Highest blood sugar he has seen has been in the low 200s which was observed in the evening  Love Bebeto recently had his eye exam at [facility]  He reports a normal exam  He is up to date with his foot examination  For hyperlipidemia he takes lipitor 10 mg daily  For hypertension he takes losartan 25 mg daily  Review of Systems   Constitutional: Negative for diaphoresis and unexpected weight change  HENT: Negative for trouble swallowing and voice change  Eyes: Negative for photophobia and visual disturbance  Respiratory: Negative for choking and shortness of breath  Cardiovascular: Negative for chest pain and palpitations  Gastrointestinal: Negative for abdominal pain, nausea and vomiting  Endocrine: Negative for polydipsia, polyphagia and polyuria  Neurological: Negative for tremors and weakness  Psychiatric/Behavioral: Negative for agitation and behavioral problems         Historical Information   Past Medical History:   Diagnosis Date    Diabetes mellitus ([facility] )      Past Surgical History:   Procedure Laterality Date    HERNIA REPAIR       Social History   Social History     Substance and Sexual Activity   Alcohol Use Yes    Comment: ocassional     Social History     Substance and Sexual Activity   Drug Use Never     Social History     Tobacco Use   Smoking Status Never Smoker   Smokeless Tobacco Never Used     Family History:   Family History   Problem Relation Age of Onset    No Known Problems Mother     Cancer Father        Meds/Allergies   Current Outpatient Medications   Medication Sig Dispense Refill    atorvastatin (LIPITOR) 10 mg tablet Take 1 tablet (10 mg total) by mouth daily 90 tablet 3    CONTOUR NEXT TEST test strip USE DAILY AS DIRECTED (Patient taking differently: No sig reported) 50 each 3    Dulaglutide (Trulicity) 1 5 VT/3 7VP SOPN Inject 0 5 mL (1 5 mg total) under the skin once a week 2 mL 3    Easy Touch Pen Needles 31G X 8 MM MISC USE AS DIRECTED WITH BASAGLAR PEN 30 each 0    Insulin Glargine Solostar (Lantus SoloStar) 100 UNIT/ML SOPN Inject 26 Units under the skin once a week 15 mL 4    Insulin Pen Needle (B-D UF III MINI PEN NEEDLES) 31G X 5 MM MISC by Does not apply route 4 (four) times a day 400 each 3    losartan (COZAAR) 25 mg tablet Take 1 tablet (25 mg total) by mouth daily 30 tablet 4    metFORMIN (GLUCOPHAGE) 1000 MG tablet Take 1 tablet (1,000 mg total) by mouth 2 (two) times a day with meals 180 tablet 4    insulin aspart (NovoLOG) 100 units/mL injection Inject 10 Units under the skin 10 units before meals (Patient not taking: No sig reported)      insulin aspart protamine-insulin aspart (NovoLOG 70/30) 100 units/mL injection Inject under the skin 2 (two) times a day before meals (Patient not taking: No sig reported)       No current facility-administered medications for this visit  Allergies   Allergen Reactions    Jardiance [Empagliflozin]      Ketoacidosis       Objective   Vitals: Blood pressure 110/60, pulse 64, height 6' 3" (1 905 m), weight 127 kg (279 lb 3 2 oz)  Physical Exam  Vitals reviewed  Constitutional:       Appearance: Normal appearance  HENT:      Head: Normocephalic and atraumatic  Eyes:      General: No scleral icterus  Conjunctiva/sclera: Conjunctivae normal    Cardiovascular:      Rate and Rhythm: Normal rate and regular rhythm  Pulmonary:      Effort: Pulmonary effort is normal  No respiratory distress  Abdominal:      Palpations: Abdomen is soft  Tenderness: There is no abdominal tenderness  Musculoskeletal:      Cervical back: Normal range of motion  Skin:     General: Skin is warm and dry  Neurological:      General: No focal deficit present  Mental Status: He is alert  Psychiatric:         Mood and Affect: Mood normal          Behavior: Behavior normal          The history was obtained from the review of the chart, patient      Lab Results:   Lab Results   Component Value Date/Time    Hemoglobin A1C 7 0 (H) 08/18/2022 07:07 AM    Hemoglobin A1C 8 3 (H) 04/28/2022 07:05 AM    Hemoglobin A1C 9 5 (H) 02/16/2022 07:00 AM    BUN 14 08/18/2022 07:07 AM    BUN 13 04/28/2022 07:04 AM    BUN 17 02/16/2022 07:00 AM    Potassium 4 2 08/18/2022 07:07 AM    Potassium 4 5 04/28/2022 07:04 AM    Potassium 4 6 02/16/2022 07:00 AM    Chloride 109 (H) 08/18/2022 07:07 AM    Chloride 109 (H) 04/28/2022 07:04 AM    Chloride 107 02/16/2022 07:00 AM    CO2 26 08/18/2022 07:07 AM    CO2 28 04/28/2022 07:04 AM    CO2 28 02/16/2022 07:00 AM    Creatinine 1 10 08/18/2022 07:07 AM    Creatinine 1 00 04/28/2022 07:04 AM    Creatinine 1 15 02/16/2022 07:00 AM    AST 13 04/28/2022 07:04 AM    ALT 29 04/28/2022 07:04 AM    Albumin 3 5 04/28/2022 07:04 AM    HDL, Direct 42 04/28/2022 07:04 AM    HDL, Direct 44 11/05/2021 07:08 AM    Triglycerides 129 04/28/2022 07:04 AM    Triglycerides 110 11/05/2021 07:08 AM       Component      Latest Ref Rng & Units 4/28/2022   Sodium      136 - 145 mmol/L 140   Potassium      3 5 - 5 3 mmol/L 4 5   Chloride      100 - 108 mmol/L 109 (H)   CO2      21 - 32 mmol/L 28   Anion Gap      4 - 13 mmol/L 3 (L)   BUN      5 - 25 mg/dL 13   Creatinine      0 60 - 1 30 mg/dL 1 00   GLUCOSE FASTING      65 - 99 mg/dL 179 (H)   Calcium      8 3 - 10 1 mg/dL 9 3   AST      5 - 45 U/L 13   ALT      12 - 78 U/L 29   Alkaline Phosphatase      46 - 116 U/L 70   Total Protein      6 4 - 8 2 g/dL 7 1   Albumin      3 5 - 5 0 g/dL 3 5   TOTAL BILIRUBIN      0 20 - 1 00 mg/dL 0 58   eGFR      ml/min/1 73sq m 81   Cholesterol      See Comment mg/dL 156   Triglycerides      See Comment mg/dL 129   HDL      >=40 mg/dL 42   LDL Calculated      0 - 100 mg/dL 88   Non-HDL Cholesterol      mg/dl 114   Hemoglobin A1C      Normal 3 8-5 6%; PreDiabetic 5 7-6 4%;  Diabetic >=6 5%; Glycemic control for adults with diabetes <7 0% % 8 3 (H)   eAG, EST AVG Glucose      mg/dl 192   C-PEPTIDE      1 1 - 4 4 ng/mL 2 5       Imaging Studies: I have personally reviewed pertinent reports  Portions of the record may have been created with voice recognition software  Occasional wrong word or "sound a like" substitutions may have occurred due to the inherent limitations of voice recognition software  Read the chart carefully and recognize, using context, where substitutions have occurred

## 2022-08-24 NOTE — TELEPHONE ENCOUNTER
----- Message from Mallory Sheehan LPN sent at 0/30/7198 10:16 AM EDT -----  Regarding: [facility]   [address] [facility] [address], [facility], [address] [place] [address]   BPWZX: [phone]

## 2022-08-24 NOTE — LETTER
Diabetic Eye Exam Form    Date Requested: 22  Patient: Dangelo Gamble  Patient : 1960   Referring Provider: No primary care provider on file  DIABETIC Eye Exam Date _______________________________    Type of Exam MUST be documented for Diabetic Eye Exams  Please CHECK ONE  Retinal Exam       Dilated Retinal Exam       OCT       Optomap-Iris Exam      Fundus Photography     Left Eye - Please check Retinopathy AND Type or No Retinopathy      Exam did show retinopathy    Exam did not show retinopathy         Mild     Proliferative           Moderate    Severe            None         Right Eye - Please check Retinopathy AND Type or No Retinopathy     Exam did show retinopathy    Exam did not show retinopathy         Mild     Proliferative        Moderate    Severe        None       Comments __________________________________________________________    Practice Providing Exam ______________________________________________    Exam Performed By (print name) _______________________________________      Provider Signature ___________________________________________________    These reports are needed for  compliance  Please fax this completed form and a copy of the Diabetic Eye Exam report to our office located at [place] [address] as soon as possible via [phone] attention Dahlia: Phone [phone]  We thank you for your assistance in treating our mutual patient

## 2022-08-24 NOTE — LETTER
2nd Request  Diabetic Eye Exam Form    Date Requested: 22  Patient: Sami Payne  Patient : 1960   Referring Provider: No primary care provider on file  DIABETIC Eye Exam Date _______________________________    Type of Exam MUST be documented for Diabetic Eye Exams  Please CHECK ONE  Retinal Exam       Dilated Retinal Exam       OCT       Optomap-Iris Exam      Fundus Photography     Left Eye - Please check Retinopathy AND Type or No Retinopathy      Exam did show retinopathy    Exam did not show retinopathy         Mild     Proliferative           Moderate    Severe            None         Right Eye - Please check Retinopathy AND Type or No Retinopathy     Exam did show retinopathy    Exam did not show retinopathy         Mild     Proliferative        Moderate    Severe        None       Comments __________________________________________________________    Practice Providing Exam ______________________________________________    Exam Performed By (print name) _______________________________________      Provider Signature ___________________________________________________    These reports are needed for  compliance  Please fax this completed form and a copy of the Diabetic Eye Exam report to our office located at [place] [address] as soon as possible via [phone] attention Dahlia: Phone [phone]  We thank you for your assistance in treating our mutual patient

## 2022-08-31 NOTE — TELEPHONE ENCOUNTER
As a follow-up, a second attempt has been made for outreach via fax, please see Contacts section for details      Thank you  Jai Banda MA

## 2022-09-01 NOTE — TELEPHONE ENCOUNTER
Upon review of the In Basket request we were able to locate, review, and update the patient chart as requested for Diabetic Eye Exam     Any additional questions or concerns should be emailed to the Practice Liaisons via [email or web address] email, please do not reply via In Basket      Thank you  Hyacinth Courtney MA

## 2022-10-11 ENCOUNTER — APPOINTMENT (OUTPATIENT)
Dept: RADIOLOGY | Facility: MEDICAL CENTER | Age: 62
End: 2022-10-11
Payer: COMMERCIAL

## 2022-10-11 ENCOUNTER — OFFICE VISIT (OUTPATIENT)
Dept: URGENT CARE | Facility: MEDICAL CENTER | Age: 62
End: 2022-10-11
Payer: COMMERCIAL

## 2022-10-11 VITALS
SYSTOLIC BLOOD PRESSURE: 132 MMHG | HEIGHT: 75 IN | BODY MASS INDEX: 35.06 KG/M2 | OXYGEN SATURATION: 99 % | HEART RATE: 76 BPM | WEIGHT: 282 LBS | DIASTOLIC BLOOD PRESSURE: 78 MMHG | TEMPERATURE: 98.6 F | RESPIRATION RATE: 18 BRPM

## 2022-10-11 DIAGNOSIS — M72.2 PLANTAR FASCIITIS OF RIGHT FOOT: Primary | ICD-10-CM

## 2022-10-11 DIAGNOSIS — M79.671 RIGHT FOOT PAIN: ICD-10-CM

## 2022-10-11 PROCEDURE — S9088 SERVICES PROVIDED IN URGENT: HCPCS | Performed by: PHYSICIAN ASSISTANT

## 2022-10-11 PROCEDURE — 73630 X-RAY EXAM OF FOOT: CPT

## 2022-10-11 PROCEDURE — 99212 OFFICE O/P EST SF 10 MIN: CPT | Performed by: PHYSICIAN ASSISTANT

## 2022-10-11 NOTE — PROGRESS NOTES
[facility]        NAME: Jossue Wild is a 65 y o  male  : 1960    MRN: [id number]  DATE: 2022  TIME: 6:15 PM    Assessment and Plan   Plantar fasciitis of right foot [M72 2]  1  Plantar fasciitis of right foot  Ambulatory Referral to Podiatry   2  Right foot pain  XR foot 3+ vw right         Patient Instructions       Follow up with PCP in 3-5 days  Proceed to  ER if symptoms worsen  Chief Complaint     Chief Complaint   Patient presents with   • Foot Pain     Right foot pain in the arch , is have a hard time putting pressure on the foot          History of Present Illness       Patient started with pain in his right arch few weeks ago  He but send of inner soles which seem to be helping  The other day he went to step up a piece of equipment and felt something tear in the bottom of his arch  Has been walking on the outside of his foot  Foot is very painful upon awakening  When he steps down without shoes on pain is the worst   After few minutes to start easing up slightly but when he puts his shoes on he has less pain  Review of Systems   Review of Systems   Constitutional: Negative for fever  Musculoskeletal:        Right foot pain   Skin: Negative for rash and wound           Current Medications       Current Outpatient Medications:   •  atorvastatin (LIPITOR) 10 mg tablet, Take 1 tablet (10 mg total) by mouth daily, Disp: 90 tablet, Rfl: 3  •  CONTOUR NEXT TEST test strip, USE DAILY AS DIRECTED (Patient taking differently: No sig reported), Disp: 50 each, Rfl: 3  •  Dulaglutide (Trulicity) 1 5 UO/2 8MN SOPN, Inject 0 5 mL (1 5 mg total) under the skin once a week, Disp: 2 mL, Rfl: 3  •  Easy Touch Pen Needles 31G X 8 MM MISC, USE AS DIRECTED WITH BASAGLAR PEN, Disp: 30 each, Rfl: 0  •  Insulin Glargine Solostar (Lantus SoloStar) 100 UNIT/ML SOPN, Inject 26 Units under the skin once a week, Disp: 15 mL, Rfl: 4  •  Insulin Pen Needle (B-D UF III MINI PEN NEEDLES) 31G X 5 MM MISC, by Does not apply route 4 (four) times a day, Disp: 400 each, Rfl: 3  •  losartan (COZAAR) 25 mg tablet, Take 1 tablet (25 mg total) by mouth daily, Disp: 30 tablet, Rfl: 4  •  metFORMIN (GLUCOPHAGE) 1000 MG tablet, Take 1 tablet (1,000 mg total) by mouth 2 (two) times a day with meals, Disp: 180 tablet, Rfl: 4  •  insulin aspart (NovoLOG) 100 units/mL injection, Inject 10 Units under the skin 10 units before meals (Patient not taking: Reported on 10/11/2022), Disp: , Rfl:   •  insulin aspart protamine-insulin aspart (NovoLOG 70/30) 100 units/mL injection, Inject under the skin 2 (two) times a day before meals (Patient not taking: Reported on 10/11/2022), Disp: , Rfl:     Current Allergies     Allergies as of 10/11/2022 - Reviewed 10/11/2022   Allergen Reaction Noted   • Jardiance [empagliflozin]  09/04/2019            The following portions of the patient's history were reviewed and updated as appropriate: allergies, current medications, past family history, past medical history, past social history, past surgical history and problem list      Past Medical History:   Diagnosis Date   • Diabetes mellitus ([facility] )        Past Surgical History:   Procedure Laterality Date   • HERNIA REPAIR         Family History   Problem Relation Age of Onset   • No Known Problems Mother    • Cancer Father          Medications have been verified  Objective   /78   Pulse 76   Temp 98 6 °F (37 °C)   Resp 18   Ht 6' 3" (1 905 m)   Wt 128 kg (282 lb)   SpO2 99%   BMI 35 25 kg/m²   No LMP for male patient  Physical Exam     Physical Exam  Vitals and nursing note reviewed  Constitutional:       Appearance: Normal appearance  HENT:      Head: Normocephalic and atraumatic  Cardiovascular:      Rate and Rhythm: Normal rate and regular rhythm  Musculoskeletal:      Comments: Right foot without rashes ecchymosis wounds  Tenderness to palpation over the plantar fascia    Increased pain with hyperextension of toes   Skin:     General: Skin is warm  Neurological:      Mental Status: He is alert       Right foot x-ray no acute bony abnormality, calcaneal spur

## 2022-10-18 ENCOUNTER — APPOINTMENT (OUTPATIENT)
Dept: RADIOLOGY | Facility: MEDICAL CENTER | Age: 62
End: 2022-10-18
Payer: COMMERCIAL

## 2022-10-18 ENCOUNTER — OFFICE VISIT (OUTPATIENT)
Dept: PODIATRY | Facility: CLINIC | Age: 62
End: 2022-10-18
Payer: COMMERCIAL

## 2022-10-18 VITALS
DIASTOLIC BLOOD PRESSURE: 80 MMHG | SYSTOLIC BLOOD PRESSURE: 127 MMHG | WEIGHT: 284.6 LBS | HEART RATE: 64 BPM | HEIGHT: 75 IN | BODY MASS INDEX: 35.39 KG/M2

## 2022-10-18 DIAGNOSIS — M79.671 RIGHT FOOT PAIN: ICD-10-CM

## 2022-10-18 DIAGNOSIS — M72.2 PLANTAR FASCIITIS OF RIGHT FOOT: ICD-10-CM

## 2022-10-18 DIAGNOSIS — M79.671 RIGHT FOOT PAIN: Primary | ICD-10-CM

## 2022-10-18 PROCEDURE — 99244 OFF/OP CNSLTJ NEW/EST MOD 40: CPT | Performed by: STUDENT IN AN ORGANIZED HEALTH CARE EDUCATION/TRAINING PROGRAM

## 2022-10-18 PROCEDURE — 73630 X-RAY EXAM OF FOOT: CPT

## 2022-10-18 RX ORDER — MELOXICAM 7.5 MG/1
7.5 TABLET ORAL DAILY
Qty: 5 TABLET | Refills: 0 | Status: SHIPPED | OUTPATIENT
Start: 2022-10-18

## 2022-10-18 NOTE — LETTER
October 18, 2022     Madhav Cortez PA-C  [facility] [address]    Patient: Renetta Mena   YOB: 1960   Date of Visit: 10/18/2022       Dear Dr Millie Haque:    Thank you for referring Renetta Mena to me for evaluation  Below are my notes for this consultation  If you have questions, please do not hesitate to call me  I look forward to following your patient along with you  Sincerely,        Lita Major DPM        CC: No Recipients  Lita Major DPM  10/18/2022 10:14 AM  Signed  Assessment/Plan:     Diagnoses and all orders for this visit:    Right foot pain  -     X-ray foot right 3+ views; Future    Plantar fasciitis of right foot  -     Ambulatory Referral to Podiatry          Imaging Reviewed at this visit (I personally reviewed images and reports in PACS)  · XR right foot 3 views NWB 10/11/22: No significant fractures or dislocations noted  I do note a small plantar calcaneal spur  · XR right foot 3 views WB 10/18/22: No significant fractures or dislocations noted  I do note a small plantar calcaneal spur and high arched foot type  IMPRESSION:  · Right arch pain since approx 9/27/22 (3 weeks)  Differential diagnosis include mid-band plantar fascial rupture in setting of chronic plantar fasciitis and cavus-leaning foot type  PLAN:  · I reviewed clinical exam and radiographic imaging (XR) with patient in detail today  I have discussed with the patient the pathophysiology of this diagnosis and reviewed how the examination correlates with this diagnosis  I discussed that a plantar fascial rupture can take time to heal and pain improve  I discussed that surgery is not recommended  · I reviewed urgent care notes and recommendations  · I recommended RICE  · I rx'd meloxicam today  · I recommended protected WB in tall CAM to offload foot  CAM boot dispensed today  · F/u 3 weeks; likely transition to sneaker with OTC arch support + AT stretching   MRI if no improvement in pain  I spent greater than 45min on this patient exam today including addressing 1 acute, new problem, reviewing prior urgent care note, ordering tall CAM and new XR, prescribing meloxicam  >50% of face-to-face time with the patient was spent counseling and/or coordinating care  Subjective:      Patient ID: Alexus Diamond is a 65 y o  male  Melissa Scott is a 64yo IDDM (A1c 7%) male who presents for right foot pain  He notes 3 weeks ago he had pain to his arch with AM first step  About a week after onset of that pain, he stepping down incorrectly and felt pain/tearing to the arch at site of prior AM pain  Since that injury he has not been able to put weight to that area due to pain  He went to urgent care 10/11/22 and was diagnosed with possible plantar fascial tear  He presents today for F/U  Ice helps  Has not taken NSAIDs  Wears Sketchers with Dr Luis Alberto V[place]la arch support  Walks on outside of his foot with minimal pain  The following portions of the patient's history were reviewed and updated as appropriate: allergies, current medications, past family history, past medical history, past social history, past surgical history and problem list     Review of Systems   Constitutional: Negative for activity change, chills and fever  HENT: Negative  Respiratory: Negative for cough, chest tightness and shortness of breath  Cardiovascular: Negative for chest pain and leg swelling  Endocrine: Negative  Genitourinary: Negative  Musculoskeletal: Positive for gait problem (Right foot pain)  Skin: Negative  Neurological: Negative for numbness  Psychiatric/Behavioral: Negative  Negative for agitation and behavioral problems  Objective:      /80 (BP Location: Left arm, Patient Position: Sitting, Cuff Size: Large)   Pulse 64   Ht 6' 3" (1 905 m)   Wt 129 kg (284 lb 9 6 oz)   BMI 35 57 kg/m²          Physical Exam  Constitutional:       Appearance: Normal appearance   He is not ill-appearing  Cardiovascular:      Pulses: Normal pulses  Comments: Bilateral DP & PT pulses 1/4  CRT WNL  Pedal hair diminished  Feet warm and well perfused  Pulmonary:      Effort: Pulmonary effort is normal  No respiratory distress  Musculoskeletal:         General: Tenderness (right foot pain at mid-arch, medial band of plantar fascia  ) present  Normal range of motion  Comments: There is decreased ankle DF with knee extended and flexed indicating gastroc-soleal equinus  Upon DF of hallux, medial band of plantar fascia is unable to be palpated however central and lateral band are intact  B/L WB exam exhibits high arch foot type, heel position subtle varus  Bilateral ankle, STJ, TNJ, TMTJ, MTPJ ROM WNL and without pain  LE muscle strength WNL  Skin:     General: Skin is warm  Capillary Refill: Capillary refill takes less than 2 seconds  Findings: No erythema or lesion  Neurological:      General: No focal deficit present  Mental Status: He is alert and oriented to person, place, and time  Sensory: No sensory deficit  Comments: Gross sensation intact  Denies N/T/B to B/L feet  Vesuvius gordon deferred      Psychiatric:         Mood and Affect: Mood normal

## 2022-10-18 NOTE — PROGRESS NOTES
Assessment/Plan:     Diagnoses and all orders for this visit:    Right foot pain  -     X-ray foot right 3+ views; Future    Plantar fasciitis of right foot  -     Ambulatory Referral to Podiatry          Imaging Reviewed at this visit (I personally reviewed images and reports in PACS)  · XR right foot 3 views NWB 10/11/22: No significant fractures or dislocations noted  I do note a small plantar calcaneal spur  · XR right foot 3 views WB 10/18/22: No significant fractures or dislocations noted  I do note a small plantar calcaneal spur and high arched foot type  IMPRESSION:  · Right arch pain since approx 9/27/22 (3 weeks)  Differential diagnosis include mid-band plantar fascial rupture in setting of chronic plantar fasciitis and cavus-leaning foot type  PLAN:  · I reviewed clinical exam and radiographic imaging (XR) with patient in detail today  I have discussed with the patient the pathophysiology of this diagnosis and reviewed how the examination correlates with this diagnosis  I discussed that a plantar fascial rupture can take time to heal and pain improve  I discussed that surgery is not recommended  · I reviewed urgent care notes and recommendations  · I recommended RICE  · I rx'd meloxicam today  · I recommended protected WB in tall CAM to offload foot  CAM boot dispensed today  · F/u 3 weeks; likely transition to sneaker with OTC arch support + AT stretching  MRI if no improvement in pain  I spent greater than 45min on this patient exam today including addressing 1 acute, new problem, reviewing prior urgent care note, ordering tall CAM and new XR, prescribing meloxicam  >50% of face-to-face time with the patient was spent counseling and/or coordinating care  Subjective:      Patient ID: Navid Razo is a 65 y o  male  Shawanda Lopes is a 62yo IDDM (A1c 7%) male who presents for right foot pain  He notes 3 weeks ago he had pain to his arch with AM first step   About a week after onset of that pain, he stepping down incorrectly and felt pain/tearing to the arch at site of prior AM pain  Since that injury he has not been able to put weight to that area due to pain  He went to urgent care 10/11/22 and was diagnosed with possible plantar fascial tear  He presents today for F/U  Ice helps  Has not taken NSAIDs  Wears Sketchers with Dr Juliane Woods arch support  Walks on outside of his foot with minimal pain  The following portions of the patient's history were reviewed and updated as appropriate: allergies, current medications, past family history, past medical history, past social history, past surgical history and problem list     Review of Systems   Constitutional: Negative for activity change, chills and fever  HENT: Negative  Respiratory: Negative for cough, chest tightness and shortness of breath  Cardiovascular: Negative for chest pain and leg swelling  Endocrine: Negative  Genitourinary: Negative  Musculoskeletal: Positive for gait problem (Right foot pain)  Skin: Negative  Neurological: Negative for numbness  Psychiatric/Behavioral: Negative  Negative for agitation and behavioral problems  Objective:      /80 (BP Location: Left arm, Patient Position: Sitting, Cuff Size: Large)   Pulse 64   Ht 6' 3" (1 905 m)   Wt 129 kg (284 lb 9 6 oz)   BMI 35 57 kg/m²          Physical Exam  Constitutional:       Appearance: Normal appearance  He is not ill-appearing  Cardiovascular:      Pulses: Normal pulses  Comments: Bilateral DP & PT pulses 1/4  CRT WNL  Pedal hair diminished  Feet warm and well perfused  Pulmonary:      Effort: Pulmonary effort is normal  No respiratory distress  Musculoskeletal:         General: Tenderness (right foot pain at mid-arch, medial band of plantar fascia  ) present  Normal range of motion  Comments: There is decreased ankle DF with knee extended and flexed indicating gastroc-soleal equinus    Upon DF of hallux, medial band of plantar fascia is unable to be palpated however central and lateral band are intact  B/L WB exam exhibits high arch foot type, heel position subtle varus  Bilateral ankle, STJ, TNJ, TMTJ, MTPJ ROM WNL and without pain  LE muscle strength WNL  Skin:     General: Skin is warm  Capillary Refill: Capillary refill takes less than 2 seconds  Findings: No erythema or lesion  Neurological:      General: No focal deficit present  Mental Status: He is alert and oriented to person, place, and time  Sensory: No sensory deficit  Comments: Gross sensation intact  Denies N/T/B to B/L feet  [place] gordon deferred      Psychiatric:         Mood and Affect: Mood normal

## 2022-11-08 ENCOUNTER — OFFICE VISIT (OUTPATIENT)
Dept: PODIATRY | Facility: CLINIC | Age: 62
End: 2022-11-08

## 2022-11-08 VITALS
BODY MASS INDEX: 35.43 KG/M2 | DIASTOLIC BLOOD PRESSURE: 66 MMHG | WEIGHT: 285 LBS | HEIGHT: 75 IN | SYSTOLIC BLOOD PRESSURE: 118 MMHG

## 2022-11-08 DIAGNOSIS — M79.671 RIGHT FOOT PAIN: Primary | ICD-10-CM

## 2022-11-08 NOTE — PATIENT INSTRUCTIONS
Foot Pain Home Therapy    Stretch!! Place painful foot in back with heel on ground and lean against wall  Do not lift heel off of ground  You will feel the stretch in the calf muscles and possibly the heel  Hold the stretch for 20-30 seconds  Do this at least 5-6 times per day  It is best done after exercise when your muscles are warm  Local ice massage  Freeze a 20oz bottle of water  Roll your foot over the ice bottle along the arch  Try this for 20 minutes, 2-3 times per day  Wear supportive shoes at all times  Avoid flip-flops, flat sandals, barefoot walking (never walk barefoot, even at home)  Generally avoid shoes that are too flexible and bend in the arch  Your shoes should only slightly bend in the toe area, not the middle  Running sneakers are often the best choice  Supportive sneaker brands: Edman Serum, New Balance, Asics, [employer]  Supportive daily shoe brands: Vionic, Orthofeet Rola, Dansko, Olive branch, Yenifer Blackman, Birkenstock  Supportive home shoes: Waldemar Collazo (recovery slides)  Purchase over the counter topical pain creams such as Voltarin gel, biofreeze, or CBD cream - will need to apply 2-3 times per day for benefit  Look in to over the counter shoe inserts/arch supports such as Superfeet, Powerstep or Spenco  These are all available on [employer]  com as well as their individual website's  Achilles Tendon Stretching Exercises    A) Standing Gastrocnemius stretch  Place hands on wall or chair  If using wall, put your hands at eye level  Step the leg you want to stretch behind you  Keep your back heel on the floor and point your toes straight ahead or slightly inward towards the heel of the opposite foot  Bend your knee toward the wall while keeping your back leg straight  Lean toward the wall until you feel a gentle stretch in you calf of the straight leg  Don't lean so far that you feel pain  Hold for 15 seconds  Complete 3 reps      B) Standing soleus stretch  Place your hands on the wall or chair  If using wall, put your hands at eye level  Step the leg you want to stretch behind you (your back foot will need to be closer to the front foot than the above stretch)  Keep your back heel on the floor and point your toes straight ahead or slightly inward towards the heel of the opposite foot  Bend both your front and back knee at the same time (may help to stick your butt out)  You do not need to lean towards the wall, just bend the knees  Lean toward the wall until you feel a gentle stretch in you calf of the straight leg  Don't lean so far that you feel pain  Hold for 15 seconds  Complete 3 reps  Keep these tips and tricks in mind to get the most out of your stretching; Take your time - move slowly, whether you are deepening into a stretch or changing positions  This will limit the risk of injury & discomfort  Avoid bouncing - quick sudden movements will only worsen achilles tendon issues  Stay relaxed during stretch  Keep your heel down and toes straight ahead or slightly inward - this will allow the achilles tendon to stretch properly  Stop if you feel pain - Don't strain or force your muscle  If you feel sharp pain, stop immediately

## 2022-11-08 NOTE — PROGRESS NOTES
Assessment/Plan:     Diagnoses and all orders for this visit:    Right foot pain  -     Ambulatory referral to Physical Therapy; Future          IMPRESSION:  · Right arch pain since approx 9/27/22 (3 weeks)  Differential diagnosis include mid-band plantar fascial rupture in setting of chronic plantar fasciitis and cavus-leaning foot type  PLAN:  · Right foot pain greatly improved with CAM WBAT, meloxicam, RICE  · I rx'd PT today  · He may slowly start transitioning out of CAM boot and in to stiff bottomed sneaker with arch support - this is to be done pending pain  · I recommended supportive shoes, OTC arch supports and AT stretching to reduce plantar foot fascial tension  I recommended shoes for home use  · F/u 4 weeks for recheck      Subjective:      Patient ID: Romi Aguilar is a 65 y o  male  Emily Kaufman presents for f/u right foot pain  Pain greatly improved with CAM WBAT, meloxicam, RICE  Has no pain in CAM boot but notes some pain (but much better than initially) when he wears his Sketchers sneaker + Dr Hancock Loud insert which he has tried to transition in to this past week  The following portions of the patient's history were reviewed and updated as appropriate: allergies, current medications, past family history, past medical history, past social history, past surgical history and problem list     Review of Systems   Constitutional: Negative for activity change, chills and fever  HENT: Negative  Respiratory: Negative for cough, chest tightness and shortness of breath  Cardiovascular: Negative for chest pain and leg swelling  Endocrine: Negative  Genitourinary: Negative  Musculoskeletal: Positive for gait problem (Right foot pain)  Skin: Negative  Neurological: Negative for numbness  Psychiatric/Behavioral: Negative  Negative for agitation and behavioral problems           Objective:      /66 (BP Location: Left arm, Patient Position: Sitting, Cuff Size: Large)   Ht 6' 3" (1 905 m)   Wt 129 kg (285 lb)   BMI 35 62 kg/m²          Physical Exam  Constitutional:       Appearance: Normal appearance  He is not ill-appearing  Cardiovascular:      Pulses: Normal pulses  Comments: Bilateral DP & PT pulses 1/4  CRT WNL  Pedal hair diminished  Feet warm and well perfused  Pulmonary:      Effort: Pulmonary effort is normal  No respiratory distress  Musculoskeletal:         General: Tenderness (right foot pain at mid-arch, medial band of plantar fascia  ) present  Normal range of motion  Comments: There is decreased ankle DF with knee extended and flexed indicating gastroc-soleal equinus  Upon DF of hallux, medial band of plantar fascia is unable to be palpated however central and lateral band are intact  B/L WB exam exhibits high arch foot type, heel position subtle varus  Bilateral ankle, STJ, TNJ, TMTJ, MTPJ ROM WNL and without pain  LE muscle strength WNL  Skin:     General: Skin is warm  Capillary Refill: Capillary refill takes less than 2 seconds  Findings: No erythema or lesion  Neurological:      General: No focal deficit present  Mental Status: He is alert and oriented to person, place, and time  Sensory: No sensory deficit  Comments: Gross sensation intact  Denies N/T/B to B/L feet  [place] gordon deferred      Psychiatric:         Mood and Affect: Mood normal

## 2022-11-18 DIAGNOSIS — R80.9 TYPE 2 DIABETES MELLITUS WITH MICROALBUMINURIA, WITH LONG-TERM CURRENT USE OF INSULIN (HCC): ICD-10-CM

## 2022-11-18 DIAGNOSIS — E11.29 TYPE 2 DIABETES MELLITUS WITH MICROALBUMINURIA, WITH LONG-TERM CURRENT USE OF INSULIN (HCC): ICD-10-CM

## 2022-11-18 DIAGNOSIS — Z79.4 TYPE 2 DIABETES MELLITUS WITH MICROALBUMINURIA, WITH LONG-TERM CURRENT USE OF INSULIN (HCC): ICD-10-CM

## 2022-11-21 RX ORDER — LOSARTAN POTASSIUM 25 MG/1
25 TABLET ORAL DAILY
Qty: 30 TABLET | Refills: 2 | Status: SHIPPED | OUTPATIENT
Start: 2022-11-21

## 2022-12-30 DIAGNOSIS — E11.65 TYPE 2 DIABETES MELLITUS WITH HYPERGLYCEMIA, WITH LONG-TERM CURRENT USE OF INSULIN (HCC): ICD-10-CM

## 2022-12-30 DIAGNOSIS — Z79.4 TYPE 2 DIABETES MELLITUS WITH HYPERGLYCEMIA, WITH LONG-TERM CURRENT USE OF INSULIN (HCC): ICD-10-CM

## 2022-12-30 RX ORDER — DULAGLUTIDE 1.5 MG/.5ML
1.5 INJECTION, SOLUTION SUBCUTANEOUS WEEKLY
Qty: 2 ML | Refills: 2 | Status: SHIPPED | OUTPATIENT
Start: 2022-12-30

## 2023-01-23 DIAGNOSIS — Z79.4 TYPE 2 DIABETES MELLITUS WITH HYPERGLYCEMIA, WITH LONG-TERM CURRENT USE OF INSULIN (HCC): Primary | ICD-10-CM

## 2023-01-23 DIAGNOSIS — E11.65 TYPE 2 DIABETES MELLITUS WITH HYPERGLYCEMIA, WITH LONG-TERM CURRENT USE OF INSULIN (HCC): Primary | ICD-10-CM

## 2023-01-23 RX ORDER — INSULIN ASPART 100 [IU]/ML
10 INJECTION, SUSPENSION SUBCUTANEOUS
Refills: 3 | Status: CANCELLED | OUTPATIENT
Start: 2023-01-23

## 2023-01-23 RX ORDER — INSULIN ASPART 100 [IU]/ML
10 INJECTION, SOLUTION INTRAVENOUS; SUBCUTANEOUS
Qty: 15 ML | Refills: 2 | Status: SHIPPED | OUTPATIENT
Start: 2023-01-23 | End: 2023-02-22

## 2023-01-23 NOTE — TELEPHONE ENCOUNTER
Patient would like his Novolog refilled  He said he uses it as needed and has been using one that he has because his blood sugar has been in the 300 range and it has not been working, upon checking, he noticed he has been using a Novolog pen that  in 2022  Please send to [facility] in [facility]

## 2023-01-27 ENCOUNTER — APPOINTMENT (OUTPATIENT)
Dept: LAB | Facility: MEDICAL CENTER | Age: 63
End: 2023-01-27

## 2023-01-27 DIAGNOSIS — Z79.4 TYPE 2 DIABETES MELLITUS WITH HYPERGLYCEMIA, WITH LONG-TERM CURRENT USE OF INSULIN (HCC): ICD-10-CM

## 2023-01-27 DIAGNOSIS — E11.65 TYPE 2 DIABETES MELLITUS WITH HYPERGLYCEMIA, WITH LONG-TERM CURRENT USE OF INSULIN (HCC): ICD-10-CM

## 2023-01-27 LAB
ANION GAP SERPL CALCULATED.3IONS-SCNC: 4 MMOL/L (ref 4–13)
BUN SERPL-MCNC: 18 MG/DL (ref 5–25)
CALCIUM SERPL-MCNC: 9.2 MG/DL (ref 8.3–10.1)
CHLORIDE SERPL-SCNC: 109 MMOL/L (ref 96–108)
CO2 SERPL-SCNC: 26 MMOL/L (ref 21–32)
CREAT SERPL-MCNC: 1.03 MG/DL (ref 0.6–1.3)
CREAT UR-MCNC: 200 MG/DL
GFR SERPL CREATININE-BSD FRML MDRD: 78 ML/MIN/1.73SQ M
GLUCOSE P FAST SERPL-MCNC: 168 MG/DL (ref 65–99)
MICROALBUMIN UR-MCNC: 30.9 MG/L (ref 0–20)
MICROALBUMIN/CREAT 24H UR: 15 MG/G CREATININE (ref 0–30)
POTASSIUM SERPL-SCNC: 4.3 MMOL/L (ref 3.5–5.3)
SODIUM SERPL-SCNC: 139 MMOL/L (ref 135–147)

## 2023-01-28 LAB
EST. AVERAGE GLUCOSE BLD GHB EST-MCNC: 209 MG/DL
HBA1C MFR BLD: 8.9 %

## 2023-01-31 ENCOUNTER — OFFICE VISIT (OUTPATIENT)
Dept: ENDOCRINOLOGY | Facility: CLINIC | Age: 63
End: 2023-01-31

## 2023-01-31 VITALS
DIASTOLIC BLOOD PRESSURE: 78 MMHG | HEIGHT: 75 IN | BODY MASS INDEX: 35.54 KG/M2 | SYSTOLIC BLOOD PRESSURE: 134 MMHG | HEART RATE: 60 BPM | WEIGHT: 285.8 LBS

## 2023-01-31 DIAGNOSIS — E66.9 OBESITY (BMI 30-39.9): ICD-10-CM

## 2023-01-31 DIAGNOSIS — E78.2 MIXED HYPERLIPIDEMIA: ICD-10-CM

## 2023-01-31 DIAGNOSIS — Z79.4 TYPE 2 DIABETES MELLITUS WITH HYPERGLYCEMIA, WITH LONG-TERM CURRENT USE OF INSULIN (HCC): Primary | ICD-10-CM

## 2023-01-31 DIAGNOSIS — M79.644 CHRONIC PAIN OF RIGHT THUMB: ICD-10-CM

## 2023-01-31 DIAGNOSIS — E11.65 TYPE 2 DIABETES MELLITUS WITH HYPERGLYCEMIA, WITH LONG-TERM CURRENT USE OF INSULIN (HCC): Primary | ICD-10-CM

## 2023-01-31 DIAGNOSIS — G89.29 CHRONIC PAIN OF RIGHT THUMB: ICD-10-CM

## 2023-01-31 DIAGNOSIS — R80.9 MICROALBUMINURIA: ICD-10-CM

## 2023-01-31 DIAGNOSIS — I10 HYPERTENSION GOAL BP (BLOOD PRESSURE) < 140/90: ICD-10-CM

## 2023-01-31 RX ORDER — PEN NEEDLE, DIABETIC 31 GX5/16"
NEEDLE, DISPOSABLE MISCELLANEOUS 4 TIMES DAILY
Qty: 400 EACH | Refills: 3 | Status: SHIPPED | OUTPATIENT
Start: 2023-01-31

## 2023-01-31 RX ORDER — FLASH GLUCOSE SCANNING READER
EACH MISCELLANEOUS
Qty: 1 EACH | Refills: 0 | Status: SHIPPED | OUTPATIENT
Start: 2023-01-31

## 2023-01-31 RX ORDER — FLASH GLUCOSE SENSOR
KIT MISCELLANEOUS
Qty: 2 EACH | Refills: 3 | Status: SHIPPED | OUTPATIENT
Start: 2023-01-31

## 2023-01-31 NOTE — PROGRESS NOTES
Derek Finley 65 y o  male MRN: [id number]    Encounter: [id number]      Assessment/Plan     1  Type 2 diabetes mellitus on long-term insulin - uncontrolled and worsening  No data available today to make informed recommendations for insulin dosing  Recommend macronutrient balanced and carb consistent diet plan  Recommend taking medications as prescribed  Recommend ACHS fingersticks  BG log provided with instructions to send values by email/fax in 1-2 weeks  Also sent script for CGM, cautioning that I am uncertain whether the device is supported under insurance  CGM can be beneficial to [place] by providing important biofeedback and greater ability to effectively manage poorly controlled diabetes  In future, may consider fixed dosing mealtime insulin plan  Would also consider referral to education for MNT counseling and DSME  2  Hyperlipidemia - continue statin  Repeat lipids prior to next visit    3  Hypertension - fair control  Continue present dosing of losartan 25 mg daily    4  Obesity - recently worsening    Problem List Items Addressed This Visit        Endocrine    Type 2 diabetes mellitus with hyperglycemia, with long-term current use of insulin (HCC) - Primary    Relevant Medications    Continuous Blood Gluc Sensor (FreeStyle Vicente 2 Sensor) MISC    Continuous Blood Gluc  (FreeStyle Vicente 2 [place]) JEFFERY    Insulin Pen Needle (B-D UF III MINI PEN NEEDLES) 31G X 5 MM MISC    Other Relevant Orders    Basic metabolic panel Lab Collect    HEMOGLOBIN A1C W/ EAG ESTIMATION Lab Collect    Lipid Panel with Direct LDL reflex Lab Collect    Microalbumin / creatinine urine ratio Lab Collect       Cardiovascular and Mediastinum    Hypertension goal BP (blood pressure) < 140/90       Other    Chronic pain of right thumb    Microalbuminuria    Mixed hyperlipidemia    Obesity (BMI 30-39  9)     CC: Diabetes    History of Present Illness     HPI:    [place] returns today for follow up of diabetes   [place] reports recent worsening of diabetes  He is unsure why diabetes has gotten worse as no changes in diet or lifestyle  BGs have been as high as 400s  He is presently taking metformin 2887 mg bid, trulicity 1 5 mg weekly, lantus 36 units daily, and variable (and intermittent) dosing of novolog  Babak Bazzi had previously been on prandial insulin but only briefly  He states that this happens with his blood sugars where they are inexplicably high for a short period of time before correcting  No meter or log today  BG by self recall improving into mid to high 100s, low 200s  Occasional nocturia, no polydipsia  Babak Bazzi is up to date on eye examination and foot examination  For hyperlipidemia he takes lipitor 10 mg daily  For hypertension he takes losartan 25 mg daily  Review of Systems   Constitutional: Negative for diaphoresis and unexpected weight change  HENT: Negative for trouble swallowing and voice change  Respiratory: Negative for cough and shortness of breath  Cardiovascular: Negative for chest pain and palpitations  Gastrointestinal: Negative for nausea and vomiting  Endocrine: Negative for polydipsia and polyuria  Neurological: Negative for weakness  All other systems reviewed and are negative        Historical Information   Past Medical History:   Diagnosis Date   • Diabetes mellitus ([facility] [address] )      Past Surgical History:   Procedure Laterality Date   • HERNIA REPAIR       Social History   Social History     Substance and Sexual Activity   Alcohol Use Yes    Comment: ocassional     Social History     Substance and Sexual Activity   Drug Use Never     Social History     Tobacco Use   Smoking Status Never   Smokeless Tobacco Never     Family History:   Family History   Problem Relation Age of Onset   • No Known Problems Mother    • Cancer Father        Meds/Allergies   Current Outpatient Medications   Medication Sig Dispense Refill   • atorvastatin (LIPITOR) 10 mg tablet Take 1 tablet (10 mg total) by mouth daily 90 tablet 3   • Continuous Blood Gluc  (FreeStyle Vicente 2 [place]) JEFFERY Scan blood sugars before meals and bedtime 1 each 0   • Continuous Blood Gluc Sensor (FreeStyle Vicente 2 Sensor) MISC Scan blood sugars before meals and bedtime 2 each 3   • CONTOUR NEXT TEST test strip USE DAILY AS DIRECTED (Patient taking differently: No sig reported) 50 each 3   • Easy Touch Pen Needles 31G X 8 MM MISC USE AS DIRECTED WITH BASAGLAR PEN 30 each 0   • insulin aspart (NovoLOG FlexPen) 100 UNIT/ML injection pen Inject 10 Units under the skin 3 (three) times a day with meals (Patient taking differently: Inject 10 Units under the skin 3 (three) times a day with meals As needed) 15 mL 2   • Insulin Glargine Solostar (Lantus SoloStar) 100 UNIT/ML SOPN Inject 26 Units under the skin once a week (Patient taking differently: Inject 26 Units under the skin in the morning 26-32) 15 mL 4   • Insulin Pen Needle (B-D UF III MINI PEN NEEDLES) 31G X 5 MM MISC Use 4 (four) times a day 400 each 3   • losartan (COZAAR) 25 mg tablet Take 1 tablet (25 mg total) by mouth daily 30 tablet 2   • metFORMIN (GLUCOPHAGE) 1000 MG tablet Take 1 tablet (1,000 mg total) by mouth 2 (two) times a day with meals 180 tablet 4   • Trulicity 1 5 KM/9 3NQ SOPN Inject 0 5 mL (1 5 mg total) under the skin once a week 2 mL 2   • meloxicam (Mobic) 7 5 mg tablet Take 1 tablet (7 5 mg total) by mouth daily (Patient not taking: Reported on 1/31/2023) 5 tablet 0     No current facility-administered medications for this visit  Allergies   Allergen Reactions   • Jardiance [Empagliflozin]      Ketoacidosis       Objective   Vitals: Blood pressure 134/78, pulse 60, height 6' 3" (1 905 m), weight 130 kg (285 lb 12 8 oz)  Physical Exam  Vitals reviewed  Constitutional:       Appearance: Normal appearance  HENT:      Head: Normocephalic and atraumatic  Eyes:      General: No scleral icterus       Conjunctiva/sclera: Conjunctivae normal  Cardiovascular:      Rate and Rhythm: Normal rate and regular rhythm  Pulmonary:      Effort: Pulmonary effort is normal  No respiratory distress  Abdominal:      Palpations: Abdomen is soft  Tenderness: There is no abdominal tenderness  Musculoskeletal:      Cervical back: Normal range of motion  Skin:     General: Skin is warm and dry  Neurological:      General: No focal deficit present  Mental Status: He is alert  Psychiatric:         Mood and Affect: Mood normal          Behavior: Behavior normal          The history was obtained from the review of the chart, patient  Lab Results:   Lab Results   Component Value Date/Time    Hemoglobin A1C 8 9 (H) 01/27/2023 07:17 AM    Hemoglobin A1C 7 0 (H) 08/18/2022 07:07 AM    Hemoglobin A1C 8 3 (H) 04/28/2022 07:05 AM    BUN 18 01/27/2023 07:17 AM    BUN 14 08/18/2022 07:07 AM    BUN 13 04/28/2022 07:04 AM    Potassium 4 3 01/27/2023 07:17 AM    Potassium 4 2 08/18/2022 07:07 AM    Potassium 4 5 04/28/2022 07:04 AM    Chloride 109 (H) 01/27/2023 07:17 AM    Chloride 109 (H) 08/18/2022 07:07 AM    Chloride 109 (H) 04/28/2022 07:04 AM    CO2 26 01/27/2023 07:17 AM    CO2 26 08/18/2022 07:07 AM    CO2 28 04/28/2022 07:04 AM    Creatinine 1 03 01/27/2023 07:17 AM    Creatinine 1 10 08/18/2022 07:07 AM    Creatinine 1 00 04/28/2022 07:04 AM    AST 13 04/28/2022 07:04 AM    ALT 29 04/28/2022 07:04 AM    Albumin 3 5 04/28/2022 07:04 AM    HDL, Direct 42 04/28/2022 07:04 AM    Triglycerides 129 04/28/2022 07:04 AM     Component      Latest Ref Rng & Units 1/27/2023   EXT Creatinine Urine      mg/dL 200 0   MICROALBUM ,U,RANDOM      0 0 - 20 0 mg/L 30 9 (H)   MICROALBUMIN/CREATININE RATIO      0 - 30 mg/g creatinine 15         Imaging Studies: I have personally reviewed pertinent reports  Portions of the record may have been created with voice recognition software   Occasional wrong word or "sound a like" substitutions may have occurred due to the inherent limitations of voice recognition software  Read the chart carefully and recognize, using context, where substitutions have occurred

## 2023-03-17 DIAGNOSIS — E11.29 TYPE 2 DIABETES MELLITUS WITH MICROALBUMINURIA, WITH LONG-TERM CURRENT USE OF INSULIN (HCC): ICD-10-CM

## 2023-03-17 DIAGNOSIS — R80.9 TYPE 2 DIABETES MELLITUS WITH MICROALBUMINURIA, WITH LONG-TERM CURRENT USE OF INSULIN (HCC): ICD-10-CM

## 2023-03-17 DIAGNOSIS — Z79.4 TYPE 2 DIABETES MELLITUS WITH MICROALBUMINURIA, WITH LONG-TERM CURRENT USE OF INSULIN (HCC): ICD-10-CM

## 2023-03-17 RX ORDER — LOSARTAN POTASSIUM 25 MG/1
25 TABLET ORAL DAILY
Qty: 30 TABLET | Refills: 0 | Status: SHIPPED | OUTPATIENT
Start: 2023-03-17

## 2023-04-24 DIAGNOSIS — Z79.4 TYPE 2 DIABETES MELLITUS WITH HYPERGLYCEMIA, WITH LONG-TERM CURRENT USE OF INSULIN (HCC): ICD-10-CM

## 2023-04-24 DIAGNOSIS — E11.65 TYPE 2 DIABETES MELLITUS WITH HYPERGLYCEMIA, WITH LONG-TERM CURRENT USE OF INSULIN (HCC): ICD-10-CM

## 2023-04-24 RX ORDER — INSULIN GLARGINE 100 [IU]/ML
26 INJECTION, SOLUTION SUBCUTANEOUS WEEKLY
Qty: 15 ML | Refills: 0 | Status: SHIPPED | OUTPATIENT
Start: 2023-04-24

## 2023-04-27 ENCOUNTER — APPOINTMENT (OUTPATIENT)
Dept: LAB | Facility: MEDICAL CENTER | Age: 63
End: 2023-04-27

## 2023-04-27 DIAGNOSIS — Z79.4 TYPE 2 DIABETES MELLITUS WITH HYPERGLYCEMIA, WITH LONG-TERM CURRENT USE OF INSULIN (HCC): ICD-10-CM

## 2023-04-27 DIAGNOSIS — E11.65 TYPE 2 DIABETES MELLITUS WITH HYPERGLYCEMIA, WITH LONG-TERM CURRENT USE OF INSULIN (HCC): ICD-10-CM

## 2023-04-27 LAB
ANION GAP SERPL CALCULATED.3IONS-SCNC: 3 MMOL/L (ref 4–13)
BUN SERPL-MCNC: 14 MG/DL (ref 5–25)
CALCIUM SERPL-MCNC: 9.2 MG/DL (ref 8.3–10.1)
CHLORIDE SERPL-SCNC: 108 MMOL/L (ref 96–108)
CHOLEST SERPL-MCNC: 168 MG/DL
CO2 SERPL-SCNC: 26 MMOL/L (ref 21–32)
CREAT SERPL-MCNC: 1 MG/DL (ref 0.6–1.3)
CREAT UR-MCNC: 237 MG/DL
GFR SERPL CREATININE-BSD FRML MDRD: 80 ML/MIN/1.73SQ M
GLUCOSE P FAST SERPL-MCNC: 158 MG/DL (ref 65–99)
HDLC SERPL-MCNC: 43 MG/DL
LDLC SERPL CALC-MCNC: 101 MG/DL (ref 0–100)
MICROALBUMIN UR-MCNC: 48.2 MG/L (ref 0–20)
MICROALBUMIN/CREAT 24H UR: 20 MG/G CREATININE (ref 0–30)
POTASSIUM SERPL-SCNC: 4.5 MMOL/L (ref 3.5–5.3)
SODIUM SERPL-SCNC: 137 MMOL/L (ref 135–147)
TRIGL SERPL-MCNC: 120 MG/DL

## 2023-05-02 LAB
EST. AVERAGE GLUCOSE BLD GHB EST-MCNC: 217 MG/DL
HBA1C MFR BLD: 9.2 %

## 2023-05-10 ENCOUNTER — OFFICE VISIT (OUTPATIENT)
Dept: ENDOCRINOLOGY | Facility: CLINIC | Age: 63
End: 2023-05-10

## 2023-05-10 VITALS
HEART RATE: 64 BPM | BODY MASS INDEX: 34.57 KG/M2 | SYSTOLIC BLOOD PRESSURE: 140 MMHG | WEIGHT: 278 LBS | DIASTOLIC BLOOD PRESSURE: 82 MMHG | HEIGHT: 75 IN

## 2023-05-10 DIAGNOSIS — E66.9 OBESITY (BMI 30-39.9): ICD-10-CM

## 2023-05-10 DIAGNOSIS — I10 HYPERTENSION GOAL BP (BLOOD PRESSURE) < 140/90: ICD-10-CM

## 2023-05-10 DIAGNOSIS — E78.2 MIXED HYPERLIPIDEMIA: ICD-10-CM

## 2023-05-10 DIAGNOSIS — E11.65 TYPE 2 DIABETES MELLITUS WITH HYPERGLYCEMIA, WITH LONG-TERM CURRENT USE OF INSULIN (HCC): Primary | ICD-10-CM

## 2023-05-10 DIAGNOSIS — Z79.4 TYPE 2 DIABETES MELLITUS WITH HYPERGLYCEMIA, WITH LONG-TERM CURRENT USE OF INSULIN (HCC): Primary | ICD-10-CM

## 2023-05-10 RX ORDER — INSULIN GLARGINE 100 [IU]/ML
40 INJECTION, SOLUTION SUBCUTANEOUS WEEKLY
Qty: 15 ML | Refills: 0 | Status: SHIPPED | OUTPATIENT
Start: 2023-05-10 | End: 2023-05-10

## 2023-05-10 RX ORDER — INSULIN GLARGINE 100 [IU]/ML
40 INJECTION, SOLUTION SUBCUTANEOUS DAILY
Qty: 15 ML | Refills: 1 | Status: SHIPPED | OUTPATIENT
Start: 2023-05-10

## 2023-05-10 RX ORDER — ATORVASTATIN CALCIUM 20 MG/1
20 TABLET, FILM COATED ORAL DAILY
Qty: 90 TABLET | Refills: 1 | Status: SHIPPED | OUTPATIENT
Start: 2023-05-10

## 2023-05-10 NOTE — PROGRESS NOTES
Jeremiah Negrete 59 y o  male MRN: [id number]    Encounter: [id number]      Assessment/Plan     1  Type 2 diabetes mellitus on long-term insulin - worsening by A1c  SMBG reflects more suitable control  Recommend increase lantus 40 units daily  May titrate by 2 - 4 units twice weekly for fasting BG goal <110-130 or until TDD 60 units (0 5 u/kg)  Continue remainder of treatments the same, although we did discuss increasing trulicity at future visit  Maricruz Ayala will start yakelin 2 CGM for 2-week period  Advised of need for intermittent scanning, and provided directions for sharing with clinic  This data may be helpful in explaining A1c result, which is more elevated than current BG log suggests  RTC 3-mo with labs, call sooner with concerns  2  Hyperlipidemia - LDL slightly elevated  Discussed increase in lipitor 20 mg daily  Will arrange follow up lipid studies at future encounter    3  Hypertension - continue losartan 25 mg daily     4  Obesity - improving    Problem List Items Addressed This Visit        Endocrine    Type 2 diabetes mellitus with hyperglycemia, with long-term current use of insulin (HCC) - Primary    Relevant Medications    Insulin Glargine Solostar (Lantus SoloStar) 100 UNIT/ML SOPN    atorvastatin (LIPITOR) 20 mg tablet    Other Relevant Orders    Basic metabolic panel Clinic Collect    HEMOGLOBIN A1C W/ EAG ESTIMATION Clinic Collect       Cardiovascular and Mediastinum    Hypertension goal BP (blood pressure) < 140/90       Other    Mixed hyperlipidemia    Relevant Medications    atorvastatin (LIPITOR) 20 mg tablet    Obesity (BMI 30-39  9)     RTC 3-4 mo    CC: Diabetes    History of Present Illness     HPI:    Maricruz Ayala returns today for follow up of diabetes  For DM, he is taking lantus 36 units daily, novolog PRN (uses rarely), metformin 1g bid, and trulicity 1 5 mg weekly  He comes with recent labs, showing worsening a1c  BG log today showing 2-3x fingerstick testing shows improved values  FBG improving, now in 120 - 140 range, pre meal values typically <140, evening values up to 262  Delbert Howard states overall BG improving from several weeks prior, where numbers could be in 200s  He denies hyperglycemic symptoms  He does have yakelin 2 sensor which he paid out of pocket  He has not started its wear yet, but states plan to do so  Delbert Howard is up to date on eye examination, he follows at [facility]  A foot examination was performed today  He does report right foot pain on account of prior injury to plantar fascia  He has seen podiatry  This is being managed conservatively  For hyperlipidemia he takes lipitor 10 mg daily  For hypertension he takes losartan 25 mg daily  Review of Systems   Constitutional: Negative for diaphoresis and unexpected weight change  HENT: Negative for trouble swallowing and voice change  Respiratory: Negative for cough and shortness of breath  Cardiovascular: Negative for chest pain and palpitations  Gastrointestinal: Negative for nausea and vomiting  Endocrine: Negative for polydipsia and polyuria  Neurological: Negative for weakness  All other systems reviewed and are negative        Historical Information   Past Medical History:   Diagnosis Date   • Diabetes mellitus ([facility] [address] )      Past Surgical History:   Procedure Laterality Date   • HERNIA REPAIR       Social History   Social History     Substance and Sexual Activity   Alcohol Use Yes    Comment: ocassional     Social History     Substance and Sexual Activity   Drug Use Never     Social History     Tobacco Use   Smoking Status Never   Smokeless Tobacco Never     Family History:   Family History   Problem Relation Age of Onset   • No Known Problems Mother    • Cancer Father        Meds/Allergies   Current Outpatient Medications   Medication Sig Dispense Refill   • atorvastatin (LIPITOR) 20 mg tablet Take 1 tablet (20 mg total) by mouth daily 90 tablet 1   • CONTOUR NEXT TEST test strip USE DAILY AS "DIRECTED (Patient taking differently: No sig reported) 50 each 3   • Easy Touch Pen Needles 31G X 8 MM MISC USE AS DIRECTED WITH BASAGLAR PEN 30 each 0   • Insulin Glargine Solostar (Lantus SoloStar) 100 UNIT/ML SOPN Inject 0 4 mL (40 Units total) under the skin in the morning 15 mL 1   • Insulin Pen Needle (B-D UF III MINI PEN NEEDLES) 31G X 5 MM MISC Use 4 (four) times a day 400 each 3   • losartan (COZAAR) 25 mg tablet Take 1 tablet (25 mg total) by mouth daily 30 tablet 5   • metFORMIN (GLUCOPHAGE) 1000 MG tablet Take 1 tablet (1,000 mg total) by mouth 2 (two) times a day with meals 180 tablet 4   • Trulicity 1 5 EH/8 0LH SOPN Inject 0 5 mL (1 5 mg total) under the skin once a week 2 mL 2   • Continuous Blood Gluc  (FreeStyle Vicente 2 [place]) JEFFERY Scan blood sugars before meals and bedtime (Patient not taking: Reported on 5/10/2023) 1 each 0   • Continuous Blood Gluc Sensor (FreeStyle Vicente 2 Sensor) MISC Scan blood sugars before meals and bedtime (Patient not taking: Reported on 5/10/2023) 2 each 3   • insulin aspart (NovoLOG FlexPen) 100 UNIT/ML injection pen Inject 10 Units under the skin 3 (three) times a day with meals (Patient taking differently: Inject 10 Units under the skin 3 (three) times a day with meals As needed) 15 mL 2   • meloxicam (Mobic) 7 5 mg tablet Take 1 tablet (7 5 mg total) by mouth daily (Patient not taking: Reported on 1/31/2023) 5 tablet 0     No current facility-administered medications for this visit  Allergies   Allergen Reactions   • Jardiance [Empagliflozin]      Ketoacidosis       Objective   Vitals: Blood pressure 140/82, pulse 64, height 6' 3\" (1 905 m), weight 126 kg (278 lb)  Physical Exam  Vitals reviewed  Constitutional:       Appearance: Normal appearance  HENT:      Head: Normocephalic and atraumatic  Eyes:      General: No scleral icterus       Conjunctiva/sclera: Conjunctivae normal    Cardiovascular:      Rate and Rhythm: Normal rate and regular " rhythm  Pulses: no weak pulses          Dorsalis pedis pulses are 2+ on the right side and 2+ on the left side  Pulmonary:      Effort: Pulmonary effort is normal  No respiratory distress  Abdominal:      Palpations: Abdomen is soft  Tenderness: There is no abdominal tenderness  Musculoskeletal:      Cervical back: Normal range of motion  Feet:      Right foot:      Skin integrity: No ulcer, skin breakdown, erythema, warmth, callus or dry skin  Left foot:      Skin integrity: No ulcer, skin breakdown, erythema, warmth, callus or dry skin  Skin:     General: Skin is warm and dry  Neurological:      General: No focal deficit present  Mental Status: He is alert  Psychiatric:         Mood and Affect: Mood normal          Behavior: Behavior normal      Diabetic Foot Exam    Patient's shoes and socks removed  Right Foot/Ankle   Right Foot Inspection  Skin Exam: skin normal and skin intact  No dry skin, no warmth, no callus, no erythema, no maceration, no abnormal color, no pre-ulcer, no ulcer and no callus  Sensory   Vibration: intact  Monofilament testing: intact    Vascular  The right DP pulse is 2+  Left Foot/Ankle  Left Foot Inspection  Skin Exam: skin normal and skin intact  No dry skin, no warmth, no erythema, no maceration, normal color, no pre-ulcer, no ulcer and no callus  Sensory   Vibration: intact  Monofilament testing: intact    Vascular  The left DP pulse is 2+  Assign Risk Category  No deformity present  No loss of protective sensation  No weak pulses  Risk: 0      The history was obtained from the review of the chart, patient      Lab Results:   Lab Results   Component Value Date/Time    Hemoglobin A1C 9 2 (H) 04/27/2023 07:20 AM    Hemoglobin A1C 8 9 (H) 01/27/2023 07:17 AM    Hemoglobin A1C 7 0 (H) 08/18/2022 07:07 AM    BUN 14 04/27/2023 07:20 AM    BUN 18 01/27/2023 07:17 AM    BUN 14 08/18/2022 07:07 AM    Potassium 4 5 04/27/2023 07:20 AM "Potassium 4 3 01/27/2023 07:17 AM    Potassium 4 2 08/18/2022 07:07 AM    Chloride 108 04/27/2023 07:20 AM    Chloride 109 (H) 01/27/2023 07:17 AM    Chloride 109 (H) 08/18/2022 07:07 AM    CO2 26 04/27/2023 07:20 AM    CO2 26 01/27/2023 07:17 AM    CO2 26 08/18/2022 07:07 AM    Creatinine 1 00 04/27/2023 07:20 AM    Creatinine 1 03 01/27/2023 07:17 AM    Creatinine 1 10 08/18/2022 07:07 AM    HDL, Direct 43 04/27/2023 07:20 AM    Triglycerides 120 04/27/2023 07:20 AM     Component      Latest Ref Rng & Units 1/27/2023   EXT Creatinine Urine      mg/dL 200 0   MICROALBUM ,U,RANDOM      0 0 - 20 0 mg/L 30 9 (H)   MICROALBUMIN/CREATININE RATIO      0 - 30 mg/g creatinine 15         Imaging Studies: I have personally reviewed pertinent reports  Portions of the record may have been created with voice recognition software  Occasional wrong word or \"sound a like\" substitutions may have occurred due to the inherent limitations of voice recognition software  Read the chart carefully and recognize, using context, where substitutions have occurred    "

## 2023-05-10 NOTE — PATIENT INSTRUCTIONS
Increase Lantus 40 units daily    Check fasting blood sugars  Goal is <110-130  May increase dose of lantus 2- 4 units twice weekly for fasting glucose target, no more often than twice weekly or until reach dose of 60 units daily  Connect yakelin to clinic    Call if any concerns

## 2023-05-11 ENCOUNTER — TELEPHONE (OUTPATIENT)
Dept: ADMINISTRATIVE | Facility: OTHER | Age: 63
End: 2023-05-11

## 2023-05-11 NOTE — TELEPHONE ENCOUNTER
----- Message from Rola Carney, [address] sent at 5/10/2023  4:02 PM EDT -----  Regarding: eye exam  Could we please get patients last eye exam from     [facility]   Address: [address], [place], [address] [place] [address] Drive, [address]   Phone: [phone]

## 2023-05-11 NOTE — LETTER
Diabetic Eye Exam Form    Date Requested: 23  Patient: Maykel Sanders  Patient : 1960   Referring Provider: No primary care provider on file  DIABETIC Eye Exam Date _______________________________      Type of Exam MUST be documented for Diabetic Eye Exams  Please CHECK ONE  Retinal Exam       Dilated Retinal Exam       OCT       Optomap-Iris Exam      Fundus Photography       Left Eye - Please check Retinopathy or No Retinopathy        Exam did show retinopathy    Exam did not show retinopathy       Right Eye - Please check Retinopathy or No Retinopathy       Exam did show retinopathy    Exam did not show retinopathy       Comments __________________________________________________________    Practice Providing Exam ______________________________________________    Exam Performed By (print name) _______________________________________      Provider Signature ___________________________________________________      These reports are needed for  compliance  Please fax this completed form and a copy of the Diabetic Eye Exam report to our office located at [place] [address] as soon as possible via Fax [phone] attention Dahlia: Phone [phone]  We thank you for your assistance in treating our mutual patient

## 2023-05-11 NOTE — TELEPHONE ENCOUNTER
Upon review of the In Basket request and the patient's chart, initial outreach has been made via fax to facility  Please see Contacts section for details       Thank you  Iris Alexander MA

## 2023-05-12 ENCOUNTER — TELEPHONE (OUTPATIENT)
Dept: ADMINISTRATIVE | Facility: OTHER | Age: 63
End: 2023-05-12

## 2023-05-12 DIAGNOSIS — E11.65 TYPE 2 DIABETES MELLITUS WITH HYPERGLYCEMIA, WITH LONG-TERM CURRENT USE OF INSULIN (HCC): ICD-10-CM

## 2023-05-12 DIAGNOSIS — Z79.4 TYPE 2 DIABETES MELLITUS WITH HYPERGLYCEMIA, WITH LONG-TERM CURRENT USE OF INSULIN (HCC): ICD-10-CM

## 2023-05-12 RX ORDER — DULAGLUTIDE 1.5 MG/.5ML
1.5 INJECTION, SOLUTION SUBCUTANEOUS WEEKLY
Qty: 2 ML | Refills: 0 | Status: SHIPPED | OUTPATIENT
Start: 2023-05-12

## 2023-05-12 NOTE — TELEPHONE ENCOUNTER
----- Message from Eros Bob sent at 5/11/2023 11:43 AM EDT -----  Regarding: eye exam  Can we please get pt's last eye exam from     [facility]   Address: [address], [place], [address], [address]   Phone: [phone]

## 2023-05-12 NOTE — LETTER
Diabetic Eye Exam Form    Date Requested: 23  Patient: Edwar Muniz  Patient : 1960   Referring Provider: No primary care provider on file  DIABETIC Eye Exam Date _______________________________      Type of Exam MUST be documented for Diabetic Eye Exams  Please CHECK ONE  Retinal Exam       Dilated Retinal Exam       OCT       Optomap-Iris Exam      Fundus Photography       Left Eye - Please check Retinopathy or No Retinopathy        Exam did show retinopathy    Exam did not show retinopathy       Right Eye - Please check Retinopathy or No Retinopathy       Exam did show retinopathy    Exam did not show retinopathy       Comments __________________________________________________________    Practice Providing Exam ______________________________________________    Exam Performed By (print name) _______________________________________      Provider Signature ___________________________________________________      These reports are needed for  compliance  Please fax this completed form and a copy of the Diabetic Eye Exam report to our office located at [place] [address] as soon as possible via Fax [phone] attention Dahlia: Phone [phone]  We thank you for your assistance in treating our mutual patient

## 2023-05-12 NOTE — TELEPHONE ENCOUNTER
Upon review of the In Basket request and the patient's chart, initial outreach has been made via fax to facility  Please see Contacts section for details       Thank you  Aniyah Bell MA

## 2023-05-12 NOTE — LETTER
2nd Request      Diabetic Eye Exam Form    Date Requested: 23  Patient: Cira Thompson  Patient : 1960   Referring Provider: No primary care provider on file  DIABETIC Eye Exam Date _______________________________      Type of Exam MUST be documented for Diabetic Eye Exams  Please CHECK ONE  Retinal Exam       Dilated Retinal Exam       OCT       Optomap-Iris Exam      Fundus Photography       Left Eye - Please check Retinopathy or No Retinopathy        Exam did show retinopathy    Exam did not show retinopathy       Right Eye - Please check Retinopathy or No Retinopathy       Exam did show retinopathy    Exam did not show retinopathy       Comments __________________________________________________________    Practice Providing Exam ______________________________________________    Exam Performed By (print name) _______________________________________      Provider Signature ___________________________________________________      These reports are needed for  compliance  Please fax this completed form and a copy of the Diabetic Eye Exam report to our office located at [place] [address] as soon as possible via Fax [phone] attention Dahlia: Phone [phone]  We thank you for your assistance in treating our mutual patient

## 2023-05-15 NOTE — TELEPHONE ENCOUNTER
Upon review of the In Basket request we Per office fax respond : Not there patient  Any additional questions or concerns should be emailed to the Practice Liaisons via the appropriate education email address, please do not reply via In Basket      Thank you  Janneth Mejia MA

## 2023-05-16 NOTE — TELEPHONE ENCOUNTER
As a follow-up, a second attempt has been made for outreach via fax to facility  Please see Contacts section for details      Thank you  Dale Montoya MA

## 2023-05-18 NOTE — TELEPHONE ENCOUNTER
As a final attempt, a third outreach has been made via telephone call to facility  Please see Contacts section for details  This encounter will be closed and completed by end of day  Should we receive the requested information because of previous outreach attempts, the requested patient's chart will be updated appropriately  Office staff states this patient is never been to there office      Thank you  Mary Kelly MA

## 2023-06-04 ENCOUNTER — HOSPITAL ENCOUNTER (EMERGENCY)
Facility: HOSPITAL | Age: 63
Discharge: HOME | End: 2023-06-04
Attending: EMERGENCY MEDICINE
Payer: COMMERCIAL

## 2023-06-04 VITALS
SYSTOLIC BLOOD PRESSURE: 148 MMHG | HEART RATE: 66 BPM | TEMPERATURE: 97 F | OXYGEN SATURATION: 97 % | RESPIRATION RATE: 20 BRPM | DIASTOLIC BLOOD PRESSURE: 93 MMHG

## 2023-06-04 DIAGNOSIS — N20.1 URETEROLITHIASIS: Primary | ICD-10-CM

## 2023-06-04 DIAGNOSIS — R10.9 LEFT FLANK PAIN: ICD-10-CM

## 2023-06-04 LAB
BACTERIA UR QL AUTO: ABNORMAL /HPF
BILIRUB UR QL STRIP: NEGATIVE
CLARITY UR: CLEAR
COLOR UR: YELLOW
GLUCOSE UR STRIP-MCNC: NEGATIVE MG/DL
HGB UR QL STRIP.AUTO: ABNORMAL
KETONES UR STRIP-MCNC: NEGATIVE MG/DL
LEUKOCYTE ESTERASE UR QL STRIP: NEGATIVE
NITRITE UR QL STRIP: NEGATIVE
NON-SQ EPI CELLS URNS QL MICRO: ABNORMAL /HPF
PH UR STRIP.AUTO: 5.5 [PH]
PROT UR STRIP-MCNC: NEGATIVE MG/DL
RBC #/AREA URNS AUTO: ABNORMAL /HPF
SP GR UR STRIP.AUTO: >=1.03 (ref 1–1.03)
UROBILINOGEN UR QL STRIP.AUTO: 0.2 E.U./DL
WBC #/AREA URNS AUTO: ABNORMAL /HPF

## 2023-06-04 PROCEDURE — 99284 EMERGENCY DEPT VISIT MOD MDM: CPT

## 2023-06-04 PROCEDURE — 81001 URINALYSIS AUTO W/SCOPE: CPT | Performed by: EMERGENCY MEDICINE

## 2023-06-04 PROCEDURE — 96372 THER/PROPH/DIAG INJ SC/IM: CPT

## 2023-06-04 RX ORDER — KETOROLAC TROMETHAMINE 30 MG/ML
30 INJECTION, SOLUTION INTRAMUSCULAR; INTRAVENOUS ONCE
Status: COMPLETED | OUTPATIENT
Start: 2023-06-04 | End: 2023-06-04

## 2023-06-04 RX ORDER — IBUPROFEN 600 MG/1
600 TABLET ORAL EVERY 6 HOURS PRN
Qty: 30 TABLET | Refills: 0 | Status: SHIPPED | OUTPATIENT
Start: 2023-06-04

## 2023-06-04 RX ORDER — ONDANSETRON 4 MG/1
4 TABLET, ORALLY DISINTEGRATING ORAL EVERY 6 HOURS PRN
Qty: 20 TABLET | Refills: 0 | Status: SHIPPED | OUTPATIENT
Start: 2023-06-04

## 2023-06-04 RX ADMIN — KETOROLAC TROMETHAMINE 30 MG: 30 INJECTION, SOLUTION INTRAMUSCULAR at 08:28

## 2023-06-04 NOTE — ED PROVIDER NOTES
History  Chief Complaint   Patient presents with   • Flank Pain     Right flank pain  Reports h/o kidney stones and this feel the same  Patient is a 52-year-old male complaining of significant right flank pain  Patient does have a history of kidney stones states this feels similar to previous kidney stone  He states he awoke due to pain in his right flank  It did radiate around to the right lower quadrant  The pain did cause 1 episode of vomiting while driving to the emergency department  He states however upon arrival to the emergency department he felt his pain ease and is currently only describing it as a slight ache in his right flank  This compared to the severe stabbing type sensation he experienced while at home  Patient currently declines any analgesia or any antiemetics  Prior to Admission Medications   Prescriptions Last Dose Informant Patient Reported? Taking?    CONTOUR NEXT TEST test strip   No No   Sig: USE DAILY AS DIRECTED   Patient taking differently: No sig reported   Continuous Blood Gluc  (FreeStyle Vicente 2 [place]) JEFFERY   No No   Sig: Scan blood sugars before meals and bedtime   Patient not taking: Reported on 5/10/2023   Continuous Blood Gluc Sensor (FreeStyle Vicente 2 Sensor) MISC   No No   Sig: Scan blood sugars before meals and bedtime   Patient not taking: Reported on 5/10/2023   Easy Touch Pen Needles 31G X 8 MM MISC   No No   Sig: USE AS DIRECTED WITH BASAGLAR PEN   Insulin Glargine Solostar (Lantus SoloStar) 100 UNIT/ML SOPN   No No   Sig: Inject 0 4 mL (40 Units total) under the skin in the morning   Insulin Pen Needle (B-D UF III MINI PEN NEEDLES) 31G X 5 MM MISC   No No   Sig: Use 4 (four) times a day   atorvastatin (LIPITOR) 20 mg tablet   No No   Sig: Take 1 tablet (20 mg total) by mouth daily   dulaglutide (Trulicity) 1 5 MS/7 8PI injection   No No   Sig: Inject 0 5 mL (1 5 mg total) under the skin once a week   insulin aspart (NovoLOG FlexPen) 100 UNIT/ML injection pen   No No   Sig: Inject 10 Units under the skin 3 (three) times a day with meals   Patient taking differently: Inject 10 Units under the skin 3 (three) times a day with meals As needed   losartan (COZAAR) 25 mg tablet   No No   Sig: Take 1 tablet (25 mg total) by mouth daily   meloxicam (Mobic) 7 5 mg tablet   No No   Sig: Take 1 tablet (7 5 mg total) by mouth daily   Patient not taking: Reported on 1/31/2023   metFORMIN (GLUCOPHAGE) 1000 MG tablet   No No   Sig: Take 1 tablet (1,000 mg total) by mouth 2 (two) times a day with meals      Facility-Administered Medications: None       Past Medical History:   Diagnosis Date   • Diabetes mellitus ([facility] [address] )        Past Surgical History:   Procedure Laterality Date   • HERNIA REPAIR         Family History   Problem Relation Age of Onset   • No Known Problems Mother    • Cancer Father      I have reviewed and agree with the history as documented  E-Cigarette/Vaping   • E-Cigarette Use Never User      E-Cigarette/Vaping Substances   • Nicotine No    • THC No    • CBD No    • Flavoring No    • Other No    • Unknown No      Social History     Tobacco Use   • Smoking status: Never   • Smokeless tobacco: Never   Vaping Use   • Vaping Use: Never used   Substance Use Topics   • Alcohol use: Yes     Comment: ocassional   • Drug use: Never       Review of Systems   Constitutional: Negative for activity change, appetite change, chills and fever  HENT: Negative for ear pain, hearing loss, rhinorrhea, sneezing, sore throat and trouble swallowing  Eyes: Negative for pain and visual disturbance  Respiratory: Negative for cough, choking, chest tightness, shortness of breath, wheezing and stridor  Cardiovascular: Negative for chest pain, palpitations and leg swelling  Gastrointestinal: Positive for nausea and vomiting  Negative for abdominal pain, constipation and diarrhea          1 episode of nausea and vomiting associated with this episode of right flank pain   Genitourinary: Positive for flank pain  Negative for difficulty urinating, dysuria, frequency, hematuria and testicular pain  Left flank pain as per HPI   Musculoskeletal: Negative for arthralgias, back pain, gait problem and neck pain  Skin: Negative for color change and rash  Allergic/Immunologic: Negative for immunocompromised state  Neurological: Negative for dizziness, seizures, syncope, speech difficulty, weakness, light-headedness, numbness and headaches  Psychiatric/Behavioral: Negative for confusion  The patient is not nervous/anxious  All other systems reviewed and are negative  Physical Exam  Physical Exam  Vitals and nursing note reviewed  Constitutional:       General: He is not in acute distress  Appearance: Normal appearance  He is well-developed and normal weight  He is not ill-appearing, toxic-appearing or diaphoretic  HENT:      Head: Normocephalic and atraumatic  Nose: Nose normal       Mouth/Throat:      Mouth: Mucous membranes are moist       Pharynx: Oropharynx is clear  Eyes:      General: No scleral icterus  Extraocular Movements: Extraocular movements intact  Conjunctiva/sclera: Conjunctivae normal    Cardiovascular:      Rate and Rhythm: Normal rate and regular rhythm  Pulses: Normal pulses  Heart sounds: Normal heart sounds  No murmur heard  Pulmonary:      Effort: Pulmonary effort is normal  No respiratory distress  Breath sounds: Normal breath sounds  No wheezing or rales  Chest:      Chest wall: No tenderness  Abdominal:      General: Bowel sounds are normal  There is no distension  Palpations: Abdomen is soft  There is no mass  Tenderness: There is no abdominal tenderness  There is right CVA tenderness  There is no left CVA tenderness, guarding or rebound  Comments: Minimal right CVA tenderness, no zoster rash, no crepitus or erythema   Musculoskeletal:         General: No tenderness or deformity  Normal range of motion  Cervical back: Normal range of motion and neck supple  Right lower leg: No edema  Left lower leg: No edema  Lymphadenopathy:      Cervical: No cervical adenopathy  Skin:     General: Skin is warm and dry  Capillary Refill: Capillary refill takes less than 2 seconds  Findings: No bruising, erythema, lesion or rash  Neurological:      General: No focal deficit present  Mental Status: He is alert and oriented to person, place, and time  Motor: No abnormal muscle tone     Psychiatric:         Mood and Affect: Mood normal          Behavior: Behavior normal          Vital Signs  ED Triage Vitals [06/04/23 0709]   Temperature Pulse Respirations Blood Pressure SpO2   (!) 97 °F (36 1 °C) 66 20 148/93 97 %      Temp Source Heart Rate Source Patient Position - Orthostatic VS BP Location FiO2 (%)   Tympanic Monitor -- Left arm --      Pain Score       --           Vitals:    06/04/23 0709   BP: 148/93   Pulse: 66         Visual Acuity      ED Medications  Medications   ketorolac (TORADOL) injection 30 mg (30 mg Intramuscular Given 6/4/23 0828)       Diagnostic Studies  Results Reviewed     Procedure Component Value Units Date/Time    Urine Microscopic [[id number]]  (Abnormal) Collected: 06/04/23 0737    Lab Status: Final result Specimen: Urine, Clean Catch Updated: 06/04/23 0802     RBC, UA 20-30 /hpf      WBC, UA 0-1 /hpf      Epithelial Cells Occasional /hpf      Bacteria, UA Occasional /hpf     UA w Reflex to Microscopic w Reflex to Culture [[id number]]  (Abnormal) Collected: 06/04/23 0737    Lab Status: Final result Specimen: Urine, Clean Catch Updated: 06/04/23 0755     Color, UA Yellow     Clarity, UA Clear     Specific Gravity, UA >=1 030     pH, UA 5 5     Leukocytes, UA Negative     Nitrite, UA Negative     Protein, UA Negative mg/dl      Glucose, UA Negative mg/dl      Ketones, UA Negative mg/dl      Urobilinogen, UA 0 2 E U /dl      Bilirubin, UA Negative Occult Blood, UA Large                 No orders to display              Procedures  Procedures         ED Course                                             Medical Decision Making  81-year-old male with right flank pain that is since resolved  History of kidney stones  States feel somewhat of previous kidney stone    Left flank pain: acute illness or injury  Ureterolithiasis: acute illness or injury  Amount and/or Complexity of Data Reviewed  External Data Reviewed: radiology and notes  Details: Reviewed previous ED visit and previous CT indicating ureterolithiasis in 2019  Labs: ordered  Decision-making details documented in ED Course  Details: Positive RBCs in urinalysis consistent with ureterolithiasis      Risk  OTC drugs  Prescription drug management  Decision regarding hospitalization  Risk Details: Discussed options with patient and spouse at bedside  Given pain is completely resolved at this time, patient is comfortable not obtaining any further imaging such as a CT scan or KUB at this time  Previous CT reviewed which did not indicate any other large kidney stones in 2019  Suspect small past ureteral lithiasis given pain is completely resolved and the presence of RBCs in the urinalysis  Gave IM Toradol while in the emergency department  Patient remains pain-free  Instructed to return if condition worsens  Follow-up with primary care physician  Encouraged to drink plenty of water in the interim          Disposition  Final diagnoses:   Ureterolithiasis - left   Left flank pain     Time reflects when diagnosis was documented in both MDM as applicable and the Disposition within this note     Time User Action Codes Description Comment    6/4/2023  8:22 AM Vale Yajaira T Add [N20 1] Ureterolithiasis     6/4/2023  8:22 AM Ammy Torres T Add [R10 9] Left flank pain     6/4/2023  8:23 AM Vale Yajaira T Modify [N20 1] Ureterolithiasis left      ED Disposition     ED Disposition Discharge    Condition   Stable    Date/Time   Sun Jun 4, 2023  8:22 AM    Comment   Debi Casanova discharge to home/self care  Follow-up Information    None         Discharge Medication List as of 6/4/2023  8:23 AM      START taking these medications    Details   ibuprofen (MOTRIN) 600 mg tablet Take 1 tablet (600 mg total) by mouth every 6 (six) hours as needed for mild pain, Starting Sun 6/4/2023, Normal      ondansetron (Zofran ODT) 4 mg disintegrating tablet Take 1 tablet (4 mg total) by mouth every 6 (six) hours as needed for nausea or vomiting, Starting Sun 6/4/2023, Normal         CONTINUE these medications which have NOT CHANGED    Details   atorvastatin (LIPITOR) 20 mg tablet Take 1 tablet (20 mg total) by mouth daily, Starting Wed 5/10/2023, Normal      Continuous Blood Gluc  (FreeStyle [place] 2 [place]) JEFFERY Scan blood sugars before meals and bedtime, Normal      Continuous Blood Gluc Sensor (FreeStyle Vicente 2 Sensor) MISC Scan blood sugars before meals and bedtime, Normal      CONTOUR NEXT TEST test strip USE DAILY AS DIRECTED, Normal      dulaglutide (Trulicity) 1 5 MC/4 2KP injection Inject 0 5 mL (1 5 mg total) under the skin once a week, Starting Fri 5/12/2023, Normal      !! Easy Touch Pen Needles 31G X 8 MM MISC USE AS DIRECTED WITH BASAGLAR PEN, Normal      insulin aspart (NovoLOG FlexPen) 100 UNIT/ML injection pen Inject 10 Units under the skin 3 (three) times a day with meals, Starting Mon 1/23/2023, Until Wed 2/22/2023, Normal      Insulin Glargine Solostar (Lantus SoloStar) 100 UNIT/ML SOPN Inject 0 4 mL (40 Units total) under the skin in the morning, Starting Wed 5/10/2023, Normal      !!  Insulin Pen Needle (B-D UF III MINI PEN NEEDLES) 31G X 5 MM MISC Use 4 (four) times a day, Starting Tue 1/31/2023, Normal      losartan (COZAAR) 25 mg tablet Take 1 tablet (25 mg total) by mouth daily, Starting Wed 4/19/2023, Normal      meloxicam (Mobic) 7 5 mg tablet Take 1 tablet (7 5 mg total) by mouth daily, Starting Tue 10/18/2022, Normal      metFORMIN (GLUCOPHAGE) 1000 MG tablet Take 1 tablet (1,000 mg total) by mouth 2 (two) times a day with meals, Starting Fri 8/12/2022, Normal       !! - Potential duplicate medications found  Please discuss with provider  No discharge procedures on file      PDMP Review     None          ED Provider  Electronically Signed by           Yaa Fay DO  06/04/23 6388

## 2023-07-27 ENCOUNTER — VBI (OUTPATIENT)
Dept: ADMINISTRATIVE | Facility: OTHER | Age: 63
End: 2023-07-27

## 2023-08-22 ENCOUNTER — VBI (OUTPATIENT)
Dept: ADMINISTRATIVE | Facility: OTHER | Age: 63
End: 2023-08-22

## 2023-08-23 DIAGNOSIS — E11.65 TYPE 2 DIABETES MELLITUS WITH HYPERGLYCEMIA, WITH LONG-TERM CURRENT USE OF INSULIN (HCC): ICD-10-CM

## 2023-08-23 DIAGNOSIS — Z79.4 TYPE 2 DIABETES MELLITUS WITH HYPERGLYCEMIA, WITH LONG-TERM CURRENT USE OF INSULIN (HCC): ICD-10-CM

## 2023-08-23 RX ORDER — INSULIN GLARGINE 100 [IU]/ML
40 INJECTION, SOLUTION SUBCUTANEOUS DAILY
Qty: 45 ML | Refills: 0 | Status: SHIPPED | OUTPATIENT
Start: 2023-08-23

## 2023-10-17 ENCOUNTER — VBI (OUTPATIENT)
Dept: ADMINISTRATIVE | Facility: OTHER | Age: 63
End: 2023-10-17

## 2023-11-02 ENCOUNTER — VBI (OUTPATIENT)
Dept: ADMINISTRATIVE | Facility: OTHER | Age: 63
End: 2023-11-02

## 2023-11-06 DIAGNOSIS — Z79.4 TYPE 2 DIABETES MELLITUS WITH HYPERGLYCEMIA, WITH LONG-TERM CURRENT USE OF INSULIN (HCC): ICD-10-CM

## 2023-11-06 DIAGNOSIS — E11.65 TYPE 2 DIABETES MELLITUS WITH HYPERGLYCEMIA, WITH LONG-TERM CURRENT USE OF INSULIN (HCC): ICD-10-CM

## 2023-11-06 RX ORDER — DULAGLUTIDE 1.5 MG/.5ML
1.5 INJECTION, SOLUTION SUBCUTANEOUS WEEKLY
Qty: 6 ML | Refills: 0 | Status: SHIPPED | OUTPATIENT
Start: 2023-11-06

## 2024-01-05 ENCOUNTER — VBI (OUTPATIENT)
Dept: ADMINISTRATIVE | Facility: OTHER | Age: 64
End: 2024-01-05

## 2024-01-20 DIAGNOSIS — E11.65 TYPE 2 DIABETES MELLITUS WITH HYPERGLYCEMIA, WITH LONG-TERM CURRENT USE OF INSULIN (HCC): ICD-10-CM

## 2024-01-20 DIAGNOSIS — Z79.4 TYPE 2 DIABETES MELLITUS WITH HYPERGLYCEMIA, WITH LONG-TERM CURRENT USE OF INSULIN (HCC): ICD-10-CM

## 2024-01-22 RX ORDER — DULAGLUTIDE 1.5 MG/.5ML
1.5 INJECTION, SOLUTION SUBCUTANEOUS WEEKLY
Qty: 6 ML | Refills: 0 | Status: SHIPPED | OUTPATIENT
Start: 2024-01-22 | End: 2024-01-24 | Stop reason: DRUGHIGH

## 2024-01-22 RX ORDER — INSULIN GLARGINE 100 [IU]/ML
40 INJECTION, SOLUTION SUBCUTANEOUS DAILY
Qty: 45 ML | Refills: 0 | Status: SHIPPED | OUTPATIENT
Start: 2024-01-22 | End: 2024-01-24

## 2024-01-22 RX ORDER — ATORVASTATIN CALCIUM 20 MG/1
20 TABLET, FILM COATED ORAL DAILY
Qty: 90 TABLET | Refills: 0 | Status: SHIPPED | OUTPATIENT
Start: 2024-01-22

## 2024-01-23 ENCOUNTER — APPOINTMENT (OUTPATIENT)
Dept: LAB | Facility: MEDICAL CENTER | Age: 64
End: 2024-01-23
Payer: COMMERCIAL

## 2024-01-23 DIAGNOSIS — Z79.4 TYPE 2 DIABETES MELLITUS WITH HYPERGLYCEMIA, WITH LONG-TERM CURRENT USE OF INSULIN (HCC): ICD-10-CM

## 2024-01-23 DIAGNOSIS — E11.65 TYPE 2 DIABETES MELLITUS WITH HYPERGLYCEMIA, WITH LONG-TERM CURRENT USE OF INSULIN (HCC): ICD-10-CM

## 2024-01-23 LAB
ANION GAP SERPL CALCULATED.3IONS-SCNC: 9 MMOL/L
BUN SERPL-MCNC: 16 MG/DL (ref 5–25)
CALCIUM SERPL-MCNC: 9.7 MG/DL (ref 8.4–10.2)
CHLORIDE SERPL-SCNC: 106 MMOL/L (ref 96–108)
CO2 SERPL-SCNC: 25 MMOL/L (ref 21–32)
CREAT SERPL-MCNC: 0.95 MG/DL (ref 0.6–1.3)
EST. AVERAGE GLUCOSE BLD GHB EST-MCNC: 143 MG/DL
GFR SERPL CREATININE-BSD FRML MDRD: 85 ML/MIN/1.73SQ M
GLUCOSE P FAST SERPL-MCNC: 126 MG/DL (ref 65–99)
HBA1C MFR BLD: 6.6 %
POTASSIUM SERPL-SCNC: 4.4 MMOL/L (ref 3.5–5.3)
SODIUM SERPL-SCNC: 140 MMOL/L (ref 135–147)

## 2024-01-23 PROCEDURE — 36415 COLL VENOUS BLD VENIPUNCTURE: CPT

## 2024-01-23 PROCEDURE — 83036 HEMOGLOBIN GLYCOSYLATED A1C: CPT

## 2024-01-23 PROCEDURE — 80048 BASIC METABOLIC PNL TOTAL CA: CPT

## 2024-01-24 ENCOUNTER — OFFICE VISIT (OUTPATIENT)
Dept: ENDOCRINOLOGY | Facility: CLINIC | Age: 64
End: 2024-01-24
Payer: COMMERCIAL

## 2024-01-24 VITALS
WEIGHT: 279.4 LBS | SYSTOLIC BLOOD PRESSURE: 142 MMHG | BODY MASS INDEX: 34.74 KG/M2 | HEART RATE: 64 BPM | HEIGHT: 75 IN | DIASTOLIC BLOOD PRESSURE: 80 MMHG

## 2024-01-24 DIAGNOSIS — R31.9 HEMATURIA, UNSPECIFIED TYPE: ICD-10-CM

## 2024-01-24 DIAGNOSIS — E78.2 MIXED HYPERLIPIDEMIA: ICD-10-CM

## 2024-01-24 DIAGNOSIS — N20.0 KIDNEY STONES: ICD-10-CM

## 2024-01-24 DIAGNOSIS — Z79.4 TYPE 2 DIABETES MELLITUS WITH HYPERGLYCEMIA, WITH LONG-TERM CURRENT USE OF INSULIN (HCC): Primary | ICD-10-CM

## 2024-01-24 DIAGNOSIS — E66.9 OBESITY (BMI 30-39.9): ICD-10-CM

## 2024-01-24 DIAGNOSIS — I10 HYPERTENSION GOAL BP (BLOOD PRESSURE) < 140/90: ICD-10-CM

## 2024-01-24 DIAGNOSIS — E11.65 TYPE 2 DIABETES MELLITUS WITH HYPERGLYCEMIA, WITH LONG-TERM CURRENT USE OF INSULIN (HCC): Primary | ICD-10-CM

## 2024-01-24 DIAGNOSIS — F41.1 GAD (GENERALIZED ANXIETY DISORDER): ICD-10-CM

## 2024-01-24 PROCEDURE — 99214 OFFICE O/P EST MOD 30 MIN: CPT | Performed by: STUDENT IN AN ORGANIZED HEALTH CARE EDUCATION/TRAINING PROGRAM

## 2024-01-24 RX ORDER — BLOOD-GLUCOSE SENSOR
EACH MISCELLANEOUS
Qty: 2 EACH | Refills: 3 | Status: SHIPPED | OUTPATIENT
Start: 2024-01-24

## 2024-01-24 RX ORDER — PAROXETINE 10 MG/1
10 TABLET, FILM COATED ORAL DAILY
Qty: 30 TABLET | Refills: 1 | Status: SHIPPED | OUTPATIENT
Start: 2024-01-24

## 2024-01-24 RX ORDER — DULAGLUTIDE 3 MG/.5ML
3 INJECTION, SOLUTION SUBCUTANEOUS
Qty: 2 ML | Refills: 3 | Status: SHIPPED | OUTPATIENT
Start: 2024-01-24

## 2024-01-24 RX ORDER — INSULIN GLARGINE 100 [IU]/ML
35 INJECTION, SOLUTION SUBCUTANEOUS DAILY
Qty: 45 ML | Refills: 0 | Status: SHIPPED | OUTPATIENT
Start: 2024-01-24

## 2024-01-24 NOTE — PROGRESS NOTES
José Miguel Costa 63 y.o. male MRN: [id number]    Encounter: [id number]      Assessment/Plan     1. Type 2 diabetes mellitus on long-term insulin - stable control. Today, discussed increase in trulicity 3 mg weekly, decrease lantus 35 units qHS. Rx for CGM. No other changes today. Follow up in 3-mo with labs.     2. Hyperlipidemia - on statin. Will arrange for follow up testing of lipid panel with next labs    3. Hypertension - continue losartan 25 mg daily     4. Obesity - stable    5. Anxiety - José Miguel is reporting multiple symptoms of anxiety. After some discussion, he is willing to pursue trial of SSRI. Initiate paroxetine. Will monitor response at follow up    6. Hematuria - José Miguel has history of kidney stones. He has microscopic hematuria noted on urinalysis, and a family history of bladder cancer. I am referring for CT imaging for evaluation, and a referral has been placed to urology for more comprehensive evaluation and management    Problem List Items Addressed This Visit       Type 2 diabetes mellitus with hyperglycemia, with long-term current use of insulin ([facility]) - Primary    Relevant Medications    Insulin Glargine Solostar (Lantus SoloStar) 100 UNIT/ML SOPN    dulaglutide (Trulicity) 3 MG/0.5ML injection    Continuous Blood Gluc Sensor (FreeStyle Vicente 3 Sensor) MISC    Other Relevant Orders    Lipid Panel with Direct LDL reflex    Basic metabolic panel    Albumin / creatinine urine ratio    Hemoglobin A1C    Mixed hyperlipidemia    Obesity (BMI 30-39.9)    Hypertension goal BP (blood pressure) < 140/90     Other Visit Diagnoses       Kidney stones        Hematuria, unspecified type        Relevant Orders    Ambulatory referral to Urology    CT abdomen pelvis w wo contrast    GENE (generalized anxiety disorder)        Relevant Medications    PARoxetine (PAXIL) 10 mg tablet          RTC 3-4 mo    CC: Diabetes    History of Present Illness     HPI:    José Miguel returns today for follow up of diabetes.     For DM,  he is taking lantus 40 units daily, metformin 1g bid, and trulicity 1.5 mg weekly.     He comes with CBG log showing AC/HS monitoring (2-4x daily) with ranges 80 - 195 (typically < 140). No hypoglycemia, no hyperglycemic symptoms.     For hyperlipidemia he takes lipitor 10 mg daily.    For hypertension he takes losartan 25 mg daily.     Notes recent anxiety, which wife corroborates. His anxiety has been affecting his day to day functioning.     Also reports intermittent episodes of left sided grain pain, and occasional flank pain. Does report spontaneous passage of stones. Does have history of microscopic hematuria on labs. There is a family history of bladder cancer.      Review of Systems   Constitutional:  Negative for diaphoresis and unexpected weight change.   HENT:  Negative for trouble swallowing and voice change.    Respiratory:  Negative for cough and shortness of breath.    Cardiovascular:  Negative for chest pain and palpitations.   Gastrointestinal:  Negative for nausea and vomiting.   Endocrine: Negative for polyuria.   Genitourinary:  Positive for flank pain.        Left groin pain   Neurological:  Negative for weakness.   Psychiatric/Behavioral:  The patient is nervous/anxious.    All other systems reviewed and are negative.      Historical Information   Past Medical History:   Diagnosis Date    Diabetes mellitus (HCC)      Past Surgical History:   Procedure Laterality Date    HERNIA REPAIR       Social History   Social History     Substance and Sexual Activity   Alcohol Use Yes    Comment: ocassional     Social History     Substance and Sexual Activity   Drug Use Never     Social History     Tobacco Use   Smoking Status Never   Smokeless Tobacco Never     Family History:   Family History   Problem Relation Age of Onset    No Known Problems Mother     Cancer Father        Meds/Allergies   Current Outpatient Medications   Medication Sig Dispense Refill    atorvastatin (LIPITOR) 20 mg tablet Take 1  tablet (20 mg total) by mouth daily 90 tablet 0    Continuous Blood Gluc Sensor (FreeStyle Vicente 3 Sensor) MISC E11.65 scan blood sugars before meals, bedtime 2 each 3    CONTOUR NEXT TEST test strip USE DAILY AS DIRECTED (Patient taking differently: No sig reported) 50 each 3    dulaglutide (Trulicity) 3 MG/0.5ML injection Inject 0.5 mL (3 mg total) under the skin every 7 days 2 mL 3    Easy Touch Pen Needles 31G X 8 MM MISC USE AS DIRECTED WITH BASAGLAR PEN 30 each 0    insulin aspart (NovoLOG FlexPen) 100 UNIT/ML injection pen Inject 10 Units under the skin 3 (three) times a day with meals (Patient taking differently: Inject 10 Units under the skin 3 (three) times a day with meals As needed) 15 mL 2    Insulin Glargine Solostar (Lantus SoloStar) 100 UNIT/ML SOPN Inject 0.35 mL (35 Units total) under the skin in the morning 45 mL 0    Insulin Pen Needle (B-D UF III MINI PEN NEEDLES) 31G X 5 MM MISC Use 4 (four) times a day 400 each 3    losartan (COZAAR) 25 mg tablet Take 1 tablet (25 mg total) by mouth daily 30 tablet 5    metFORMIN (GLUCOPHAGE) 1000 MG tablet Take 1 tablet (1,000 mg total) by mouth 2 (two) times a day with meals 180 tablet 4    PARoxetine (PAXIL) 10 mg tablet Take 1 tablet (10 mg total) by mouth daily 30 tablet 1    Continuous Blood Gluc  (FreeStyle Vicente 2 [place]) JEFFERY Scan blood sugars before meals and bedtime (Patient not taking: Reported on 5/10/2023) 1 each 0    Continuous Blood Gluc Sensor (FreeStyle Vicente 2 Sensor) MISC Scan blood sugars before meals and bedtime (Patient not taking: Reported on 5/10/2023) 2 each 3    ibuprofen (MOTRIN) 600 mg tablet Take 1 tablet (600 mg total) by mouth every 6 (six) hours as needed for mild pain (Patient not taking: Reported on 1/24/2024) 30 tablet 0    meloxicam (Mobic) 7.5 mg tablet Take 1 tablet (7.5 mg total) by mouth daily (Patient not taking: Reported on 1/31/2023) 5 tablet 0    ondansetron (Zofran ODT) 4 mg disintegrating tablet Take 1  "tablet (4 mg total) by mouth every 6 (six) hours as needed for nausea or vomiting (Patient not taking: Reported on 1/24/2024) 20 tablet 0     No current facility-administered medications for this visit.     Allergies   Allergen Reactions    Jardiance [Empagliflozin]      Ketoacidosis       Objective   Vitals: Blood pressure 142/80, pulse 64, height 6' 3\" (1.905 m), weight 127 kg (279 lb 6.4 oz).    Physical Exam  Vitals reviewed.   Constitutional:       Appearance: Normal appearance.   HENT:      Head: Normocephalic and atraumatic.   Eyes:      General: No scleral icterus.     Conjunctiva/sclera: Conjunctivae normal.   Cardiovascular:      Rate and Rhythm: Normal rate and regular rhythm.   Pulmonary:      Effort: Pulmonary effort is normal. No respiratory distress.   Abdominal:      Palpations: Abdomen is soft.      Tenderness: There is no abdominal tenderness.   Musculoskeletal:      Cervical back: Normal range of motion.   Skin:     General: Skin is warm and dry.   Neurological:      General: No focal deficit present.      Mental Status: He is alert.   Psychiatric:         Mood and Affect: Mood normal.         Behavior: Behavior normal.     Diabetic Foot Exam    Diabetic Foot Exam    The history was obtained from the review of the chart, patient.    Lab Results:   Lab Results   Component Value Date/Time    Hemoglobin A1C 6.6 (H) 01/23/2024 07:15 AM    Hemoglobin A1C 9.2 (H) 04/27/2023 07:20 AM    Hemoglobin A1C 8.9 (H) 01/27/2023 07:17 AM    BUN 16 01/23/2024 07:15 AM    BUN 14 04/27/2023 07:20 AM    BUN 18 01/27/2023 07:17 AM    Potassium 4.4 01/23/2024 07:15 AM    Potassium 4.5 04/27/2023 07:20 AM    Potassium 4.3 01/27/2023 07:17 AM    Chloride 106 01/23/2024 07:15 AM    Chloride 108 04/27/2023 07:20 AM    Chloride 109 (H) 01/27/2023 07:17 AM    CO2 25 01/23/2024 07:15 AM    CO2 26 04/27/2023 07:20 AM    CO2 26 01/27/2023 07:17 AM    Creatinine 0.95 01/23/2024 07:15 AM    Creatinine 1.00 04/27/2023 07:20 AM    " "Creatinine 1.03 01/27/2023 07:17 AM    HDL, Direct 43 04/27/2023 07:20 AM    Triglycerides 120 04/27/2023 07:20 AM     Component      Latest Ref Rng & Units 1/27/2023   EXT Creatinine Urine      mg/dL 200.0   MICROALBUM.,U,RANDOM      0.0 - 20.0 mg/L 30.9 (H)   MICROALBUMIN/CREATININE RATIO      0 - 30 mg/g creatinine 15         Imaging Studies: I have personally reviewed pertinent reports.      Portions of the record may have been created with voice recognition software. Occasional wrong word or \"sound a like\" substitutions may have occurred due to the inherent limitations of voice recognition software. Read the chart carefully and recognize, using context, where substitutions have occurred.  "

## 2024-02-01 ENCOUNTER — HOSPITAL ENCOUNTER (OUTPATIENT)
Dept: CT IMAGING | Facility: HOSPITAL | Age: 64
Discharge: HOME | End: 2024-02-01
Attending: STUDENT IN AN ORGANIZED HEALTH CARE EDUCATION/TRAINING PROGRAM
Payer: COMMERCIAL

## 2024-02-01 DIAGNOSIS — R31.9 HEMATURIA, UNSPECIFIED TYPE: ICD-10-CM

## 2024-02-01 PROCEDURE — 74178 CT ABD&PLV WO CNTR FLWD CNTR: CPT

## 2024-02-01 PROCEDURE — G1004 CDSM NDSC: HCPCS

## 2024-02-01 RX ADMIN — IOHEXOL 100 ML: 350 INJECTION, SOLUTION INTRAVENOUS at 07:00

## 2024-02-22 ENCOUNTER — OFFICE VISIT (OUTPATIENT)
Dept: UROLOGY | Facility: CLINIC | Age: 64
End: 2024-02-22
Payer: COMMERCIAL

## 2024-02-22 VITALS
WEIGHT: 282 LBS | DIASTOLIC BLOOD PRESSURE: 80 MMHG | BODY MASS INDEX: 35.06 KG/M2 | HEIGHT: 75 IN | SYSTOLIC BLOOD PRESSURE: 130 MMHG | HEART RATE: 89 BPM | OXYGEN SATURATION: 98 %

## 2024-02-22 DIAGNOSIS — N20.0 NEPHROLITHIASIS: ICD-10-CM

## 2024-02-22 DIAGNOSIS — R31.9 HEMATURIA, UNSPECIFIED TYPE: Primary | ICD-10-CM

## 2024-02-22 LAB
SL AMB  POCT GLUCOSE, UA: NORMAL
SL AMB LEUKOCYTE ESTERASE,UA: NORMAL
SL AMB POCT BILIRUBIN,UA: NORMAL
SL AMB POCT BLOOD,UA: NORMAL
SL AMB POCT CLARITY,UA: CLEAR
SL AMB POCT COLOR,UA: YELLOW
SL AMB POCT KETONES,UA: NORMAL
SL AMB POCT NITRITE,UA: NORMAL
SL AMB POCT PH,UA: 5
SL AMB POCT SPECIFIC GRAVITY,UA: 1.03
SL AMB POCT URINE PROTEIN: NORMAL
SL AMB POCT UROBILINOGEN: 0.2

## 2024-02-22 PROCEDURE — 99203 OFFICE O/P NEW LOW 30 MIN: CPT

## 2024-02-22 PROCEDURE — 81002 URINALYSIS NONAUTO W/O SCOPE: CPT

## 2024-02-22 NOTE — PROGRESS NOTES
"2/22/2024    No chief complaint on file.      Assessment and Plan    63 y.o. male new patient to office    Hematuria  Based on his description, he likely passed a large stone which would account for his microscopic hematuria a few months prior. He has denied any gross hematuria. No repeat urine testing since his ER visit in June when he had microscopic hematuria with c/o flank pain.   Urine dip today is negative for blood or infection.  He does have a family history of bladder cancer in his father and personal history of light tobacco use as a teenager. No chemical exposure.   CT imaging with and without IV contrast from 2/01/2024 showed no concerning masses, lesion, or stones within the kidneys/ureters/bladder.   He will follow-up in 6 months with repeat urine testing. I would only recommend cystoscopy evaluation for true microscopic hematuria in the abscence of infection or stones.       History of Present Illness  José Miguel Costa is a 63 y.o. male new patient to office with PMHx seen for T2DM, hypertension, hyperlipidemia, and lung nodule. Here for evaluation of hematuria.    He reports a history of kidney stones in the past and was last seen at [facility] in June of 2023 for severe right flank pain. His pain has subsided while he was in the ER and there was no CT imaging done. He was discharged home. Urine testing at the time was positive for blood. He reports his first stone episode was in July of 2021 at which time he was seen at [facility]. CT imaging from July 2021 showed a 2 mm stone in the left UVJ. He was able to pass that stone spontaneously    Most recently he saw his endocrinologist and mention that about 6 months ago he noticed crystallized sediment in his urine followed by passage of a large \"river rock\" like stone the size of an M&M. He states it was tan in color and hard like a rock. He denied flank pain at the time but did report left lower quadrant abdominal pain. He denies " "any recurrent pain or passage of stones since. This occurred about 1-2 months after he was seen in the ER for complaints of flank pain.     His Endocrinologist Dr. Andry Riddle [facility] referred him and he had CT abdomen pelvis with and without IV contrast on 2/1/2024 which was essentially unremarkable.  There was no urinary tract calculi, suspicious renal mass, or upper tract urothelial lesions seen.  Urinary bladder was normal with no mass or calculi.  Does have cholelithiasis without findings of acute cholecystitis.   He does have a family history of bladder cancer in his father.      Former smoker as a teenager for about 10 year about 1 ppd. Negative chemical exposure history.       Review of Systems   Constitutional:  Negative for chills and fever.   HENT:  Negative for congestion and sore throat.    Respiratory:  Negative for cough and shortness of breath.    Cardiovascular:  Negative for chest pain and leg swelling.   Gastrointestinal:  Negative for abdominal pain, constipation and diarrhea.   Genitourinary:  Negative for difficulty urinating, dysuria, frequency, hematuria and urgency.   Musculoskeletal:  Negative for back pain and gait problem.   Skin:  Negative for wound.   Allergic/Immunologic: Negative for immunocompromised state.   Hematological:  Does not bruise/bleed easily.               Vitals  Vitals:    02/22/24 1337   BP: 130/80   Pulse: 89   SpO2: 98%   Weight: 128 kg (282 lb)   Height: 6' 3\" (1.905 m)       Physical Exam  Vitals reviewed.   Constitutional:       General: He is not in acute distress.     Appearance: Normal appearance. He is not ill-appearing or toxic-appearing.   HENT:      Head: Normocephalic and atraumatic.   Eyes:      General: No scleral icterus.     Conjunctiva/sclera: Conjunctivae normal.   Cardiovascular:      Rate and Rhythm: Normal rate.   Pulmonary:      Effort: Pulmonary effort is normal. No respiratory distress.   Abdominal:      Tenderness: There is no right CVA " tenderness or left CVA tenderness.      Hernia: No hernia is present.   Musculoskeletal:      Cervical back: Normal range of motion.      Right lower leg: No edema.      Left lower leg: No edema.   Skin:     General: Skin is warm and dry.      Coloration: Skin is not jaundiced or pale.   Neurological:      General: No focal deficit present.      Mental Status: He is alert and oriented to person, place, and time. Mental status is at baseline.      Gait: Gait normal.   Psychiatric:         Mood and Affect: Mood normal.         Behavior: Behavior normal.         Thought Content: Thought content normal.         Judgment: Judgment normal.         Past History  Past Medical History:   Diagnosis Date    Diabetes mellitus (HCC)      Social History     Socioeconomic History    Marital status: Single     Spouse name: None    Number of children: None    Years of education: None    Highest education level: None   Occupational History    None   Tobacco Use    Smoking status: Never    Smokeless tobacco: Never   Vaping Use    Vaping status: Never Used   Substance and Sexual Activity    Alcohol use: Yes     Comment: ocassional    Drug use: Never    Sexual activity: None   Other Topics Concern    None   Social History Narrative    None     Social Determinants of Health     Financial Resource Strain: Not on file   Food Insecurity: Not on file   Transportation Needs: Not on file   Physical Activity: Not on file   Stress: Not on file   Social Connections: Not on file   Intimate Partner Violence: Not on file   Housing Stability: Not on file     Social History     Tobacco Use   Smoking Status Never   Smokeless Tobacco Never     Family History   Problem Relation Age of Onset    Cancer Father         bladder cancer    No Known Problems Mother        The following portions of the patient's history were reviewed and updated as appropriate allergies, current medications, past medical history, past social history, past surgical history and  problem list    [email or web address]  CT ABDOMEN AND PELVIS WITH AND WITHOUT IV CONTRAST     INDICATION: R31.9: Hematuria, unspecified.     COMPARISON: CT dated 2021     TECHNIQUE: Initial CT of the abdomen and pelvis was performed without intravenous contrast. Subsequent dynamic CT evaluation of the abdomen and pelvis was performed after the administration of intravenous contrast in both nephrographic and delayed   phases. Multiplanar 2D reformatted images were created from the source data.     This examination, like all CT scans performed in the [facility], was performed utilizing techniques to minimize radiation dose exposure, including the use of iterative reconstruction and automated exposure control. Radiation dose length   product (DLP) for this visit: 3830.57 mGy-cm     IV Contrast: 100 mL of iohexol (OMNIPAQUE)  Enteric Contrast: Not administered.     FINDINGS:     ABDOMEN     RIGHT KIDNEY AND URETER:  No solid renal mass or detectable urothelial mass.  No hydronephrosis or hydroureter.  No urinary tract calculi.  No perinephric collection.     LEFT KIDNEY AND URETER:  Small left renal sinus cyst. No solid renal mass or detectable urothelial mass.  No hydronephrosis or hydroureter.  No urinary tract calculi.  No perinephric collection.     URINARY BLADDER:  No bladder wall mass.  No calculi.        LOWER CHEST: Stable 4 mm right lower lobe nodule (3/12). Benign 4 mm fissural nodule in the right minor fissure. No pleural effusion. Small hiatal hernia.     LIVER/BILIARY TREE: Unremarkable.     GALLBLADDER: Cholelithiasis without findings of acute cholecystitis.     SPLEEN: Unremarkable.     PANCREAS: Unremarkable.     ADRENAL GLANDS: Unremarkable.     STOMACH AND BOWEL: Colonic diverticulosis without findings of acute diverticulitis.     APPENDIX: Normal appendix.     ABDOMINOPELVIC CAVITY: No ascites. No pneumoperitoneum. No lymphadenopathy.     VESSELS: Atherosclerosis without  abdominal aortic aneurysm.     PELVIS     REPRODUCTIVE ORGANS: Unremarkable for patient's age.     ABDOMINAL WALL/INGUINAL REGIONS: Prior umbilical hernia repair with mesh in place. Stable small fat-containing bilateral inguinal hernias.     BONES: No acute fracture or suspicious osseous lesion.     IMPRESSION:     1. No urinary tract calculi, suspicious renal mass or upper tract urothelial lesion.     2. Cholelithiasis.          Results  Recent Results (from the past 1 hour(s))   POCT urine dip    Collection Time: 02/22/24  2:19 PM   Result Value Ref Range    LEUKOCYTE ESTERASE,UA -     NITRITE,UA -     SL AMB POCT UROBILINOGEN 0.2     POCT URINE PROTEIN -      PH,UA 5.0     BLOOD,UA -     SPECIFIC GRAVITY,UA 1.030     KETONES,UA -     BILIRUBIN,UA +     GLUCOSE, UA -      COLOR,UA yellow     CLARITY,UA clear    ]  Lab Results   Component Value Date    PSA 0.4 05/15/2019     Lab Results   Component Value Date    CALCIUM 9.7 01/23/2024    K 4.4 01/23/2024    CO2 25 01/23/2024     01/23/2024    BUN 16 01/23/2024    CREATININE 0.95 01/23/2024     Lab Results   Component Value Date    WBC 9.64 07/13/2021    HGB 13.9 07/13/2021    HCT 42.1 07/13/2021    MCV 86 07/13/2021     07/13/2021       Please Note:  Voice dictation software has been used to create this document. There may be inadvertent transcriptions errors.     MARYAM Garnett 02/22/24

## 2024-02-24 DIAGNOSIS — F41.1 GAD (GENERALIZED ANXIETY DISORDER): ICD-10-CM

## 2024-02-24 DIAGNOSIS — Z79.4 TYPE 2 DIABETES MELLITUS WITH HYPERGLYCEMIA, WITH LONG-TERM CURRENT USE OF INSULIN (HCC): ICD-10-CM

## 2024-02-24 DIAGNOSIS — R80.9 TYPE 2 DIABETES MELLITUS WITH MICROALBUMINURIA, WITH LONG-TERM CURRENT USE OF INSULIN (HCC): ICD-10-CM

## 2024-02-24 DIAGNOSIS — Z79.4 TYPE 2 DIABETES MELLITUS WITH MICROALBUMINURIA, WITH LONG-TERM CURRENT USE OF INSULIN (HCC): ICD-10-CM

## 2024-02-24 DIAGNOSIS — E11.65 TYPE 2 DIABETES MELLITUS WITH HYPERGLYCEMIA, WITH LONG-TERM CURRENT USE OF INSULIN (HCC): ICD-10-CM

## 2024-02-24 DIAGNOSIS — E11.29 TYPE 2 DIABETES MELLITUS WITH MICROALBUMINURIA, WITH LONG-TERM CURRENT USE OF INSULIN (HCC): ICD-10-CM

## 2024-02-26 RX ORDER — INSULIN GLARGINE 100 [IU]/ML
35 INJECTION, SOLUTION SUBCUTANEOUS DAILY
Qty: 45 ML | Refills: 0 | Status: SHIPPED | OUTPATIENT
Start: 2024-02-26

## 2024-02-26 RX ORDER — ATORVASTATIN CALCIUM 20 MG/1
20 TABLET, FILM COATED ORAL DAILY
Qty: 90 TABLET | Refills: 0 | Status: SHIPPED | OUTPATIENT
Start: 2024-02-26

## 2024-02-26 RX ORDER — DULAGLUTIDE 3 MG/.5ML
3 INJECTION, SOLUTION SUBCUTANEOUS
Qty: 2 ML | Refills: 0 | Status: SHIPPED | OUTPATIENT
Start: 2024-02-26

## 2024-02-26 RX ORDER — PAROXETINE 10 MG/1
10 TABLET, FILM COATED ORAL DAILY
Qty: 30 TABLET | Refills: 0 | Status: SHIPPED | OUTPATIENT
Start: 2024-02-26

## 2024-02-26 RX ORDER — LOSARTAN POTASSIUM 25 MG/1
25 TABLET ORAL DAILY
Qty: 30 TABLET | Refills: 5 | Status: SHIPPED | OUTPATIENT
Start: 2024-02-26

## 2024-03-02 DIAGNOSIS — E11.65 TYPE 2 DIABETES MELLITUS WITH HYPERGLYCEMIA, WITH LONG-TERM CURRENT USE OF INSULIN (HCC): ICD-10-CM

## 2024-03-02 DIAGNOSIS — E78.2 MIXED HYPERLIPIDEMIA: ICD-10-CM

## 2024-03-02 DIAGNOSIS — Z79.4 TYPE 2 DIABETES MELLITUS WITH HYPERGLYCEMIA, WITH LONG-TERM CURRENT USE OF INSULIN (HCC): ICD-10-CM

## 2024-05-30 DIAGNOSIS — E11.65 TYPE 2 DIABETES MELLITUS WITH HYPERGLYCEMIA, WITH LONG-TERM CURRENT USE OF INSULIN (HCC): ICD-10-CM

## 2024-05-30 DIAGNOSIS — Z79.4 TYPE 2 DIABETES MELLITUS WITH HYPERGLYCEMIA, WITH LONG-TERM CURRENT USE OF INSULIN (HCC): ICD-10-CM

## 2024-05-31 RX ORDER — INSULIN GLARGINE 100 [IU]/ML
35 INJECTION, SOLUTION SUBCUTANEOUS DAILY
Qty: 45 ML | Refills: 0 | Status: SHIPPED | OUTPATIENT
Start: 2024-05-31 | End: 2024-06-03

## 2024-05-31 RX ORDER — DULAGLUTIDE 3 MG/.5ML
3 INJECTION, SOLUTION SUBCUTANEOUS
Qty: 2 ML | Refills: 5 | Status: SHIPPED | OUTPATIENT
Start: 2024-05-31

## 2024-06-03 DIAGNOSIS — Z79.4 TYPE 2 DIABETES MELLITUS WITH HYPERGLYCEMIA, WITH LONG-TERM CURRENT USE OF INSULIN (HCC): ICD-10-CM

## 2024-06-03 DIAGNOSIS — E11.65 TYPE 2 DIABETES MELLITUS WITH HYPERGLYCEMIA, WITH LONG-TERM CURRENT USE OF INSULIN (HCC): ICD-10-CM

## 2024-06-03 RX ORDER — INSULIN GLARGINE 100 [IU]/ML
INJECTION, SOLUTION SUBCUTANEOUS
Qty: 45 ML | Refills: 0 | Status: SHIPPED | OUTPATIENT
Start: 2024-06-03

## 2024-06-05 DIAGNOSIS — E11.29 TYPE 2 DIABETES MELLITUS WITH MICROALBUMINURIA, WITH LONG-TERM CURRENT USE OF INSULIN (HCC): ICD-10-CM

## 2024-06-05 DIAGNOSIS — Z79.4 TYPE 2 DIABETES MELLITUS WITH MICROALBUMINURIA, WITH LONG-TERM CURRENT USE OF INSULIN (HCC): ICD-10-CM

## 2024-06-05 DIAGNOSIS — R80.9 TYPE 2 DIABETES MELLITUS WITH MICROALBUMINURIA, WITH LONG-TERM CURRENT USE OF INSULIN (HCC): ICD-10-CM

## 2024-06-05 DIAGNOSIS — Z79.4 TYPE 2 DIABETES MELLITUS WITH HYPERGLYCEMIA, WITH LONG-TERM CURRENT USE OF INSULIN (HCC): ICD-10-CM

## 2024-06-05 DIAGNOSIS — E11.65 TYPE 2 DIABETES MELLITUS WITH HYPERGLYCEMIA, WITH LONG-TERM CURRENT USE OF INSULIN (HCC): ICD-10-CM

## 2024-06-05 RX ORDER — LOSARTAN POTASSIUM 25 MG/1
25 TABLET ORAL DAILY
Qty: 30 TABLET | Refills: 5 | Status: SHIPPED | OUTPATIENT
Start: 2024-06-05

## 2024-06-05 RX ORDER — ATORVASTATIN CALCIUM 20 MG/1
20 TABLET, FILM COATED ORAL DAILY
Qty: 30 TABLET | Refills: 0 | Status: SHIPPED | OUTPATIENT
Start: 2024-06-05

## 2024-06-25 ENCOUNTER — VBI (OUTPATIENT)
Dept: ADMINISTRATIVE | Facility: OTHER | Age: 64
End: 2024-06-25

## 2024-06-25 NOTE — TELEPHONE ENCOUNTER
06/25/24 10:20 AM     Chart reviewed for Diabetic Eye Exam was/were not submitted to the patient's insurance.     Dahlia Pollock MA   [facility]

## 2024-07-01 ENCOUNTER — HOSPITAL ENCOUNTER (EMERGENCY)
Facility: HOSPITAL | Age: 64
Discharge: HOME | End: 2024-07-01
Attending: EMERGENCY MEDICINE
Payer: COMMERCIAL

## 2024-07-01 ENCOUNTER — APPOINTMENT (EMERGENCY)
Dept: CT IMAGING | Facility: HOSPITAL | Age: 64
End: 2024-07-01
Payer: COMMERCIAL

## 2024-07-01 VITALS
TEMPERATURE: 98.7 F | HEART RATE: 62 BPM | RESPIRATION RATE: 18 BRPM | OXYGEN SATURATION: 98 % | DIASTOLIC BLOOD PRESSURE: 93 MMHG | SYSTOLIC BLOOD PRESSURE: 147 MMHG

## 2024-07-01 DIAGNOSIS — R10.9 LEFT FLANK PAIN: ICD-10-CM

## 2024-07-01 DIAGNOSIS — R82.71 BACTERIURIA: ICD-10-CM

## 2024-07-01 DIAGNOSIS — N20.1 LEFT URETERAL STONE: Primary | ICD-10-CM

## 2024-07-01 LAB
ALBUMIN SERPL BCG-MCNC: 4.2 G/DL (ref 3.5–5)
ALP SERPL-CCNC: 63 U/L (ref 34–104)
ALT SERPL W P-5'-P-CCNC: 20 U/L (ref 7–52)
AMORPH URATE CRY URNS QL MICRO: ABNORMAL
ANION GAP SERPL CALCULATED.3IONS-SCNC: 9 MMOL/L (ref 4–13)
AST SERPL W P-5'-P-CCNC: 14 U/L (ref 13–39)
BACTERIA UR QL AUTO: ABNORMAL /HPF
BASOPHILS # BLD AUTO: 0.08 THOUSANDS/ÂΜL (ref 0–0.1)
BASOPHILS NFR BLD AUTO: 1 % (ref 0–1)
BILIRUB SERPL-MCNC: 0.43 MG/DL (ref 0.2–1)
BILIRUB UR QL STRIP: NEGATIVE
BUN SERPL-MCNC: 18 MG/DL (ref 5–25)
CALCIUM SERPL-MCNC: 9.6 MG/DL (ref 8.4–10.2)
CHLORIDE SERPL-SCNC: 106 MMOL/L (ref 96–108)
CLARITY UR: ABNORMAL
CO2 SERPL-SCNC: 26 MMOL/L (ref 21–32)
COLOR UR: YELLOW
CREAT SERPL-MCNC: 1.08 MG/DL (ref 0.6–1.3)
EOSINOPHIL # BLD AUTO: 0.15 THOUSAND/ÂΜL (ref 0–0.61)
EOSINOPHIL NFR BLD AUTO: 2 % (ref 0–6)
ERYTHROCYTE [DISTWIDTH] IN BLOOD BY AUTOMATED COUNT: 12.4 % (ref 11.6–15.1)
GFR SERPL CREATININE-BSD FRML MDRD: 72 ML/MIN/1.73SQ M
GLUCOSE SERPL-MCNC: 151 MG/DL (ref 65–140)
GLUCOSE UR STRIP-MCNC: ABNORMAL MG/DL
HCT VFR BLD AUTO: 41.6 % (ref 36.5–49.3)
HGB BLD-MCNC: 14 G/DL (ref 12–17)
HGB UR QL STRIP.AUTO: ABNORMAL
IMM GRANULOCYTES # BLD AUTO: 0.03 THOUSAND/UL (ref 0–0.2)
IMM GRANULOCYTES NFR BLD AUTO: 0 % (ref 0–2)
KETONES UR STRIP-MCNC: NEGATIVE MG/DL
LEUKOCYTE ESTERASE UR QL STRIP: NEGATIVE
LIPASE SERPL-CCNC: 185 U/L (ref 11–82)
LYMPHOCYTES # BLD AUTO: 3.2 THOUSANDS/ÂΜL (ref 0.6–4.47)
LYMPHOCYTES NFR BLD AUTO: 36 % (ref 14–44)
MCH RBC QN AUTO: 28.6 PG (ref 26.8–34.3)
MCHC RBC AUTO-ENTMCNC: 33.7 G/DL (ref 31.4–37.4)
MCV RBC AUTO: 85 FL (ref 82–98)
MONOCYTES # BLD AUTO: 0.67 THOUSAND/ÂΜL (ref 0.17–1.22)
MONOCYTES NFR BLD AUTO: 8 % (ref 4–12)
MUCOUS THREADS UR QL AUTO: ABNORMAL
NEUTROPHILS # BLD AUTO: 4.72 THOUSANDS/ÂΜL (ref 1.85–7.62)
NEUTS SEG NFR BLD AUTO: 53 % (ref 43–75)
NITRITE UR QL STRIP: NEGATIVE
NON-SQ EPI CELLS URNS QL MICRO: ABNORMAL /HPF
NRBC BLD AUTO-RTO: 0 /100 WBCS
PH UR STRIP.AUTO: 5 [PH]
PLATELET # BLD AUTO: 235 THOUSANDS/UL (ref 149–390)
PMV BLD AUTO: 10.1 FL (ref 8.9–12.7)
POTASSIUM SERPL-SCNC: 4.1 MMOL/L (ref 3.5–5.3)
PROT SERPL-MCNC: 6.7 G/DL (ref 6.4–8.4)
PROT UR STRIP-MCNC: ABNORMAL MG/DL
RBC # BLD AUTO: 4.9 MILLION/UL (ref 3.88–5.62)
RBC #/AREA URNS AUTO: ABNORMAL /HPF
SODIUM SERPL-SCNC: 141 MMOL/L (ref 135–147)
SP GR UR STRIP.AUTO: >=1.03 (ref 1–1.03)
UROBILINOGEN UR STRIP-ACNC: <2 MG/DL
WBC # BLD AUTO: 8.85 THOUSAND/UL (ref 4.31–10.16)
WBC #/AREA URNS AUTO: ABNORMAL /HPF

## 2024-07-01 PROCEDURE — 74176 CT ABD & PELVIS W/O CONTRAST: CPT

## 2024-07-01 PROCEDURE — 96375 TX/PRO/DX INJ NEW DRUG ADDON: CPT

## 2024-07-01 PROCEDURE — 85025 COMPLETE CBC W/AUTO DIFF WBC: CPT | Performed by: EMERGENCY MEDICINE

## 2024-07-01 PROCEDURE — 96374 THER/PROPH/DIAG INJ IV PUSH: CPT

## 2024-07-01 PROCEDURE — 81001 URINALYSIS AUTO W/SCOPE: CPT | Performed by: EMERGENCY MEDICINE

## 2024-07-01 PROCEDURE — 36415 COLL VENOUS BLD VENIPUNCTURE: CPT | Performed by: EMERGENCY MEDICINE

## 2024-07-01 PROCEDURE — 99284 EMERGENCY DEPT VISIT MOD MDM: CPT

## 2024-07-01 PROCEDURE — 99284 EMERGENCY DEPT VISIT MOD MDM: CPT | Performed by: EMERGENCY MEDICINE

## 2024-07-01 PROCEDURE — 83690 ASSAY OF LIPASE: CPT | Performed by: EMERGENCY MEDICINE

## 2024-07-01 PROCEDURE — 80053 COMPREHEN METABOLIC PANEL: CPT | Performed by: EMERGENCY MEDICINE

## 2024-07-01 RX ORDER — TAMSULOSIN HYDROCHLORIDE 0.4 MG/1
0.4 CAPSULE ORAL
Qty: 14 CAPSULE | Refills: 0 | Status: SHIPPED | OUTPATIENT
Start: 2024-07-01 | End: 2024-07-15

## 2024-07-01 RX ORDER — HYDROMORPHONE HCL/PF 1 MG/ML
0.5 SYRINGE (ML) INJECTION ONCE
Status: COMPLETED | OUTPATIENT
Start: 2024-07-01 | End: 2024-07-01

## 2024-07-01 RX ORDER — ONDANSETRON 4 MG/1
4 TABLET, ORALLY DISINTEGRATING ORAL EVERY 6 HOURS PRN
Qty: 12 TABLET | Refills: 0 | Status: SHIPPED | OUTPATIENT
Start: 2024-07-01

## 2024-07-01 RX ORDER — CEFPODOXIME PROXETIL 200 MG/1
TABLET, FILM COATED ORAL
Status: DISPENSED
Start: 2024-07-01 | End: 2024-07-01

## 2024-07-01 RX ORDER — CEFPODOXIME PROXETIL 200 MG/1
200 TABLET, FILM COATED ORAL 2 TIMES DAILY
Qty: 20 TABLET | Refills: 0 | Status: SHIPPED | OUTPATIENT
Start: 2024-07-01 | End: 2024-07-11

## 2024-07-01 RX ORDER — IBUPROFEN 400 MG/1
400 TABLET ORAL EVERY 6 HOURS PRN
Qty: 30 TABLET | Refills: 0 | Status: SHIPPED | OUTPATIENT
Start: 2024-07-01

## 2024-07-01 RX ORDER — KETOROLAC TROMETHAMINE 30 MG/ML
15 INJECTION, SOLUTION INTRAMUSCULAR; INTRAVENOUS ONCE
Status: COMPLETED | OUTPATIENT
Start: 2024-07-01 | End: 2024-07-01

## 2024-07-01 RX ORDER — CEFPODOXIME PROXETIL 200 MG/1
200 TABLET, FILM COATED ORAL ONCE
Status: COMPLETED | OUTPATIENT
Start: 2024-07-01 | End: 2024-07-01

## 2024-07-01 RX ADMIN — KETOROLAC TROMETHAMINE 15 MG: 30 INJECTION, SOLUTION INTRAMUSCULAR at 00:57

## 2024-07-01 RX ADMIN — CEFPODOXIME PROXETIL 200 MG: 200 TABLET, FILM COATED ORAL at 04:33

## 2024-07-01 RX ADMIN — HYDROMORPHONE HYDROCHLORIDE 0.5 MG: 1 INJECTION, SOLUTION INTRAMUSCULAR; INTRAVENOUS; SUBCUTANEOUS at 02:11

## 2024-07-01 NOTE — ED PROVIDER NOTES
History  Chief Complaint   Patient presents with    Abdominal Pain     Began w/ lower back pain 15 minutes ago, hx kidney stones.     64-year-old male who presents for left flank pain.  Patient has a history of kidney stones.  He describes that 15 minutes prior to arrival, he developed severe left flank pain.  Does not radiate to the abdomen.  No fevers or chills.  Does describe having nausea, vomited twice prior to arrival.  No diarrhea.  No dysuria or frequency.  Review of systems otherwise negative        Prior to Admission Medications   Prescriptions Last Dose Informant Patient Reported? Taking?   CONTOUR NEXT TEST test strip   No No   Sig: USE DAILY AS DIRECTED   Patient taking differently: No sig reported   Continuous Blood Gluc  (FreeStyle Vicente 2 [place]) JEFFERY   No No   Sig: Scan blood sugars before meals and bedtime   Patient not taking: Reported on 5/10/2023   Continuous Blood Gluc Sensor (FreeStyle Vicente 2 Sensor) MISC   No No   Sig: Scan blood sugars before meals and bedtime   Patient not taking: Reported on 5/10/2023   Continuous Blood Gluc Sensor (FreeStyle Vicente 3 Sensor) MISC   No No   Sig: E11.65 scan blood sugars before meals, bedtime   Easy Touch Pen Needles 31G X 8 MM MISC   No No   Sig: USE AS DIRECTED WITH BASAGLAR PEN   Insulin Glargine Solostar (Lantus SoloStar) 100 UNIT/ML SOPN   No No   Sig: E11.65 inject 30 units daily.   Insulin Pen Needle (B-D UF III MINI PEN NEEDLES) 31G X 5 MM MISC   No No   Sig: Use 4 (four) times a day   PARoxetine (PAXIL) 10 mg tablet   No No   Sig: Take 1 tablet (10 mg total) by mouth daily   atorvastatin (LIPITOR) 20 mg tablet   No No   Sig: Take 1 tablet (20 mg total) by mouth daily   dulaglutide (Trulicity) 3 MG/0.5ML injection   No No   Sig: Inject 0.5 mL (3 mg total) under the skin every 7 days   ibuprofen (MOTRIN) 600 mg tablet   No No   Sig: Take 1 tablet (600 mg total) by mouth every 6 (six) hours as needed for mild pain   Patient not taking:  Reported on 1/24/2024   insulin aspart (NovoLOG FlexPen) 100 UNIT/ML injection pen   No No   Sig: Inject 10 Units under the skin 3 (three) times a day with meals   Patient not taking: Reported on 2/22/2024   losartan (COZAAR) 25 mg tablet   No No   Sig: Take 1 tablet (25 mg total) by mouth daily   meloxicam (Mobic) 7.5 mg tablet   No No   Sig: Take 1 tablet (7.5 mg total) by mouth daily   Patient not taking: Reported on 1/31/2023   metFORMIN (GLUCOPHAGE) 1000 MG tablet   No No   Sig: Take 1 tablet (1,000 mg total) by mouth 2 (two) times a day with meals   ondansetron (Zofran ODT) 4 mg disintegrating tablet   No No   Sig: Take 1 tablet (4 mg total) by mouth every 6 (six) hours as needed for nausea or vomiting   Patient not taking: Reported on 1/24/2024      Facility-Administered Medications: None       Past Medical History:   Diagnosis Date    Diabetes mellitus (HCC)        Past Surgical History:   Procedure Laterality Date    HERNIA REPAIR         Family History   Problem Relation Age of Onset    Cancer Father         bladder cancer    No Known Problems Mother      I have reviewed and agree with the history as documented.    E-Cigarette/Vaping    E-Cigarette Use Never User      E-Cigarette/Vaping Substances    Nicotine No     THC No     CBD No     Flavoring No     Other No     Unknown No      Social History     Tobacco Use    Smoking status: Never    Smokeless tobacco: Never   Vaping Use    Vaping status: Never Used   Substance Use Topics    Alcohol use: Yes     Comment: ocassional    Drug use: Never       Review of Systems   Constitutional:  Negative for chills, diaphoresis, fatigue and fever.   HENT:  Negative for congestion and sore throat.    Eyes:  Negative for visual disturbance.   Respiratory:  Negative for cough, chest tightness and shortness of breath.    Cardiovascular:  Negative for chest pain, palpitations and leg swelling.   Gastrointestinal:  Positive for nausea and vomiting. Negative for abdominal  distention, abdominal pain, constipation and diarrhea.   Genitourinary:  Positive for flank pain. Negative for difficulty urinating and dysuria.   Musculoskeletal:  Negative for arthralgias and myalgias.   Skin:  Negative for rash.   Neurological:  Negative for dizziness, weakness, light-headedness, numbness and headaches.   Psychiatric/Behavioral:  Negative for agitation, behavioral problems and confusion. The patient is not nervous/anxious.    All other systems reviewed and are negative.      Physical Exam  Physical Exam  Constitutional:       Appearance: He is well-developed.   HENT:      Head: Normocephalic and atraumatic.   Cardiovascular:      Rate and Rhythm: Normal rate and regular rhythm.      Heart sounds: Normal heart sounds. No murmur heard.  Pulmonary:      Effort: Pulmonary effort is normal. No respiratory distress.      Breath sounds: Normal breath sounds.   Abdominal:      General: Bowel sounds are normal. There is no distension.      Palpations: Abdomen is soft.      Tenderness: There is no abdominal tenderness. There is left CVA tenderness. There is no guarding or rebound.   Musculoskeletal:         General: No deformity.   Skin:     General: Skin is warm.      Findings: No rash.   Neurological:      Mental Status: He is alert and oriented to person, place, and time.   Psychiatric:         Behavior: Behavior normal.         Thought Content: Thought content normal.         Judgment: Judgment normal.         Vital Signs  ED Triage Vitals   Temperature Pulse Respirations Blood Pressure SpO2   07/01/24 0048 07/01/24 0048 07/01/24 0048 07/01/24 0050 07/01/24 0048   98.7 °F (37.1 °C) 70 20 149/74 99 %      Temp Source Heart Rate Source Patient Position - Orthostatic VS BP Location FiO2 (%)   07/01/24 0048 -- -- -- --   Temporal          Pain Score       07/01/24 0048       10 - Worst Possible Pain           Vitals:    07/01/24 0048 07/01/24 0050 07/01/24 0231   BP:  149/74 147/93   Pulse: 70  62          Visual Acuity      ED Medications  Medications   ketorolac (TORADOL) injection 15 mg (15 mg Intravenous Given 7/1/24 0057)   HYDROmorphone (DILAUDID) injection 0.5 mg (0.5 mg Intravenous Given 7/1/24 0211)   cefpodoxime (VANTIN) tablet 200 mg (200 mg Oral Given 7/1/24 0433)       Diagnostic Studies  Results Reviewed       Procedure Component Value Units Date/Time    Urine Microscopic [[id number]]  (Abnormal) Collected: 07/01/24 0247    Lab Status: Final result Specimen: Urine, Clean Catch Updated: 07/01/24 0321     RBC, UA Innumerable /hpf      WBC, UA 2-4 /hpf      Epithelial Cells Occasional /hpf      Bacteria, UA Moderate /hpf      MUCUS THREADS Occasional     Amorphous Crystals, UA Occasional    UA w Reflex to Microscopic w Reflex to Culture [[id number]]  (Abnormal) Collected: 07/01/24 0247    Lab Status: Edited Result - FINAL Specimen: Urine, Clean Catch Updated: 07/01/24 0321     Color, UA Yellow     Clarity, UA Cloudy     Specific Gravity, UA >=1.030     pH, UA 5.0     Leukocytes, UA Negative     Nitrite, UA Negative     Protein, UA 30 (1+) mg/dl      Glucose, UA 70 (7/100%) mg/dl      Ketones, UA Negative mg/dl      Urobilinogen, UA <2.0 mg/dl      Bilirubin, UA Negative     Occult Blood, UA Large    Comprehensive metabolic panel [[id number]]  (Abnormal) Collected: 07/01/24 0056    Lab Status: Final result Specimen: Blood from Arm, Right Updated: 07/01/24 0124     Sodium 141 mmol/L      Potassium 4.1 mmol/L      Chloride 106 mmol/L      CO2 26 mmol/L      ANION GAP 9 mmol/L      BUN 18 mg/dL      Creatinine 1.08 mg/dL      Glucose 151 mg/dL      Calcium 9.6 mg/dL      AST 14 U/L      ALT 20 U/L      Alkaline Phosphatase 63 U/L      Total Protein 6.7 g/dL      Albumin 4.2 g/dL      Total Bilirubin 0.43 mg/dL      eGFR 72 ml/min/1.73sq m     Narrative:      National Kidney Disease Foundation guidelines for Chronic Kidney Disease (CKD):     Stage 1 with normal or high GFR (GFR > 90 mL/min/1.73 square  meters)    Stage 2 Mild CKD (GFR = 60-89 mL/min/1.73 square meters)    Stage 3A Moderate CKD (GFR = 45-59 mL/min/1.73 square meters)    Stage 3B Moderate CKD (GFR = 30-44 mL/min/1.73 square meters)    Stage 4 Severe CKD (GFR = 15-29 mL/min/1.73 square meters)    Stage 5 End Stage CKD (GFR <15 mL/min/1.73 square meters)  Note: GFR calculation is accurate only with a steady state creatinine    Lipase [[id number]]  (Abnormal) Collected: 07/01/24 0056    Lab Status: Final result Specimen: Blood from Arm, Right Updated: 07/01/24 0124     Lipase 185 u/L     CBC and differential [[id number]] Collected: 07/01/24 0056    Lab Status: Final result Specimen: Blood from Arm, Right Updated: 07/01/24 0103     WBC 8.85 Thousand/uL      RBC 4.90 Million/uL      Hemoglobin 14.0 g/dL      Hematocrit 41.6 %      MCV 85 fL      MCH 28.6 pg      MCHC 33.7 g/dL      RDW 12.4 %      MPV 10.1 fL      Platelets 235 Thousands/uL      nRBC 0 /100 WBCs      Segmented % 53 %      Immature Grans % 0 %      Lymphocytes % 36 %      Monocytes % 8 %      Eosinophils Relative 2 %      Basophils Relative 1 %      Absolute Neutrophils 4.72 Thousands/µL      Absolute Immature Grans 0.03 Thousand/uL      Absolute Lymphocytes 3.20 Thousands/µL      Absolute Monocytes 0.67 Thousand/µL      Eosinophils Absolute 0.15 Thousand/µL      Basophils Absolute 0.08 Thousands/µL                    CT renal stone study abdomen pelvis wo contrast   Final Result by Kristi Hicks MD (07/01 0236)      Mild left hydroureteronephrosis secondary to a 1 mm stone at the left ureterovesical junction         Workstation performed: [id number]                    Procedures  Procedures         ED Course  ED Course as of 07/01/24 0530   Mon Jul 01, 2024   0125 LIPASE(!): 185  Mild elevation, <3x upper limit of normal, inconsistent with pancreatitis   0340 Bacteria, UA(!): Moderate  Will discuss with urology, plan for abx                               SBIRT 20yo+      Flowsheet Row Most  Recent Value   Initial Alcohol Screen: US AUDIT-C     1. How often do you have a drink containing alcohol? 0 Filed at: 07/01/2024 0201   2. How many drinks containing alcohol do you have on a typical day you are drinking?  0 Filed at: 07/01/2024 0201   3a. Male UNDER 65: How often do you have five or more drinks on one occasion? 0 Filed at: 07/01/2024 0201   3b. FEMALE Any Age, or MALE 65+: How often do you have 4 or more drinks on one occassion? 0 Filed at: 07/01/2024 0201   Audit-C Score 0 Filed at: 07/01/2024 0201   RUTHANN: How many times in the past year have you...    Used an illegal drug or used a prescription medication for non-medical reasons? Never Filed at: 07/01/2024 0201                      Medical Decision Making  I reviewed the patient's medical chart, PMHx, prior encounters, medications.    My DDx includes: pancreatitis, diverticulitis, kidney stone. At risk for UTI, pyelonephritis.    Will obtain GI labs including CBC, CMP to evaluate electrolytes and kidney function, lipase to evaluate pancreas. Will check UA. Will treat supportively, reassess.     CBC, CMP were unremarkable. UA did demonstrate evidence of possible infection vs. Bacteriuria.  Patient CT scan did demonstrate evidence of a small 1 mm left UVJ stone.  Given possible bacteriuria, I did discuss the case with urology who recommended antibiotics be initiated.  I started the patient on cefpodoxime.  He was not SIRS positive, no fevers or chills.  Ultimately, I discharged the patient with strict return precautions after significant pain improvement, prescribed Motrin, Flomax, Zofran, cefpodoxime, recommended follow-up with urology.    Amount and/or Complexity of Data Reviewed  Labs: ordered. Decision-making details documented in ED Course.  Radiology: ordered.    Risk  Prescription drug management.             Disposition  Final diagnoses:   Left ureteral stone   Left flank pain   Bacteriuria     Time reflects when diagnosis was documented in  both MDM as applicable and the Disposition within this note       Time User Action Codes Description Comment    7/1/2024  2:39 AM Christopher Momin Add [N20.1] Left ureteral stone     7/1/2024  2:39 AM Chirstopher Momin Add [R10.9] Left flank pain     7/1/2024  3:54 AM Christopher Momin Add [R82.71] Bacteriuria           ED Disposition       ED Disposition   Discharge    Condition   Stable    Date/Time   Mon Jul 1, 2024 0354    Comment   José Miguel Kingharjeet discharge to home/self care.                   Follow-up Information       Follow up With Specialties Details Why Contact Info Additional Information    [facility] Call  For re-evaluation [address] [facility] [address] [phone] [facility] [place] [address] [address], [place], [address] [phone]            Discharge Medication List as of 7/1/2024  3:54 AM        START taking these medications    Details   cefpodoxime (VANTIN) 200 mg tablet Take 1 tablet (200 mg total) by mouth 2 (two) times a day for 10 days, Starting Mon 7/1/2024, Until Thu 7/11/2024, Normal      !! ibuprofen (MOTRIN) 400 mg tablet Take 1 tablet (400 mg total) by mouth every 6 (six) hours as needed for mild pain, Starting Mon 7/1/2024, Normal      !! ondansetron (ZOFRAN-ODT) 4 mg disintegrating tablet Take 1 tablet (4 mg total) by mouth every 6 (six) hours as needed for nausea or vomiting, Starting Mon 7/1/2024, Normal      tamsulosin (FLOMAX) 0.4 mg Take 1 capsule (0.4 mg total) by mouth daily with dinner for 14 days, Starting Mon 7/1/2024, Until Mon 7/15/2024, Normal       !! - Potential duplicate medications found. Please discuss with provider.        CONTINUE these medications which have NOT CHANGED    Details   atorvastatin (LIPITOR) 20 mg tablet Take 1 tablet (20 mg total) by mouth daily, Starting Wed 6/5/2024, Normal      Continuous Blood Gluc  (FreeStyle Vicente 2 [place]) JEFFERY Scan  blood sugars before meals and bedtime, Normal      !! Continuous Blood Gluc Sensor (FreeStyle Vicente 2 Sensor) MISC Scan blood sugars before meals and bedtime, Normal      !! Continuous Blood Gluc Sensor (FreeStyle Vicente 3 Sensor) MISC E11.65 scan blood sugars before meals, bedtime, Normal      CONTOUR NEXT TEST test strip USE DAILY AS DIRECTED, Normal      dulaglutide (Trulicity) 3 MG/0.5ML injection Inject 0.5 mL (3 mg total) under the skin every 7 days, Starting Fri 5/31/2024, Normal      !! Easy Touch Pen Needles 31G X 8 MM MISC USE AS DIRECTED WITH BASAGLAR PEN, Normal      !! ibuprofen (MOTRIN) 600 mg tablet Take 1 tablet (600 mg total) by mouth every 6 (six) hours as needed for mild pain, Starting Sun 6/4/2023, Normal      insulin aspart (NovoLOG FlexPen) 100 UNIT/ML injection pen Inject 10 Units under the skin 3 (three) times a day with meals, Starting Mon 1/23/2023, Until Wed 1/24/2024, Normal      Insulin Glargine Solostar (Lantus SoloStar) 100 UNIT/ML SOPN E11.65 inject 30 units daily., Fill Later      !! Insulin Pen Needle (B-D UF III MINI PEN NEEDLES) 31G X 5 MM MISC Use 4 (four) times a day, Starting Tue 1/31/2023, Normal      losartan (COZAAR) 25 mg tablet Take 1 tablet (25 mg total) by mouth daily, Starting Wed 6/5/2024, Normal      meloxicam (Mobic) 7.5 mg tablet Take 1 tablet (7.5 mg total) by mouth daily, Starting Tue 10/18/2022, Normal      metFORMIN (GLUCOPHAGE) 1000 MG tablet Take 1 tablet (1,000 mg total) by mouth 2 (two) times a day with meals, Starting Mon 3/4/2024, Normal      !! ondansetron (Zofran ODT) 4 mg disintegrating tablet Take 1 tablet (4 mg total) by mouth every 6 (six) hours as needed for nausea or vomiting, Starting Sun 6/4/2023, Normal      PARoxetine (PAXIL) 10 mg tablet Take 1 tablet (10 mg total) by mouth daily, Starting Mon 2/26/2024, Normal       !! - Potential duplicate medications found. Please discuss with provider.              PDMP Review       None            ED  Provider  Electronically Signed by             Christopher Momin MD  07/01/24 0596

## 2024-07-12 ENCOUNTER — VBI (OUTPATIENT)
Dept: ADMINISTRATIVE | Facility: OTHER | Age: 64
End: 2024-07-12

## 2024-07-12 NOTE — TELEPHONE ENCOUNTER
07/12/24 10:13 AM     Chart reviewed for Diabetic Eye Exam was/were not submitted to the patient's insurance.     Dahlia Pollock MA   [facility]

## 2024-07-18 ENCOUNTER — PATIENT MESSAGE (OUTPATIENT)
Dept: ENDOCRINOLOGY | Facility: CLINIC | Age: 64
End: 2024-07-18

## 2024-07-18 ENCOUNTER — APPOINTMENT (OUTPATIENT)
Dept: LAB | Facility: MEDICAL CENTER | Age: 64
End: 2024-07-18
Payer: COMMERCIAL

## 2024-07-18 DIAGNOSIS — E78.2 MIXED HYPERLIPIDEMIA: ICD-10-CM

## 2024-07-18 DIAGNOSIS — Z79.4 TYPE 2 DIABETES MELLITUS WITH HYPERGLYCEMIA, WITH LONG-TERM CURRENT USE OF INSULIN (HCC): ICD-10-CM

## 2024-07-18 DIAGNOSIS — E11.65 TYPE 2 DIABETES MELLITUS WITH HYPERGLYCEMIA, WITH LONG-TERM CURRENT USE OF INSULIN (HCC): ICD-10-CM

## 2024-07-18 LAB
ANION GAP SERPL CALCULATED.3IONS-SCNC: 9 MMOL/L (ref 4–13)
BUN SERPL-MCNC: 15 MG/DL (ref 5–25)
CALCIUM SERPL-MCNC: 8.9 MG/DL (ref 8.4–10.2)
CHLORIDE SERPL-SCNC: 108 MMOL/L (ref 96–108)
CHOLEST SERPL-MCNC: 145 MG/DL
CO2 SERPL-SCNC: 23 MMOL/L (ref 21–32)
CREAT SERPL-MCNC: 0.92 MG/DL (ref 0.6–1.3)
CREAT UR-MCNC: 174.7 MG/DL
EST. AVERAGE GLUCOSE BLD GHB EST-MCNC: 148 MG/DL
GFR SERPL CREATININE-BSD FRML MDRD: 88 ML/MIN/1.73SQ M
GLUCOSE P FAST SERPL-MCNC: 127 MG/DL (ref 65–99)
HBA1C MFR BLD: 6.8 %
HDLC SERPL-MCNC: 44 MG/DL
LDLC SERPL CALC-MCNC: 82 MG/DL (ref 0–100)
MICROALBUMIN UR-MCNC: 13.2 MG/L
MICROALBUMIN/CREAT 24H UR: 8 MG/G CREATININE (ref 0–30)
POTASSIUM SERPL-SCNC: 4.2 MMOL/L (ref 3.5–5.3)
SODIUM SERPL-SCNC: 140 MMOL/L (ref 135–147)
TRIGL SERPL-MCNC: 95 MG/DL

## 2024-07-18 PROCEDURE — 36415 COLL VENOUS BLD VENIPUNCTURE: CPT

## 2024-07-18 PROCEDURE — 82043 UR ALBUMIN QUANTITATIVE: CPT

## 2024-07-18 PROCEDURE — 80048 BASIC METABOLIC PNL TOTAL CA: CPT

## 2024-07-18 PROCEDURE — 83036 HEMOGLOBIN GLYCOSYLATED A1C: CPT

## 2024-07-18 PROCEDURE — 80061 LIPID PANEL: CPT

## 2024-07-18 PROCEDURE — 82570 ASSAY OF URINE CREATININE: CPT

## 2024-07-18 RX ORDER — INSULIN GLARGINE 100 [IU]/ML
INJECTION, SOLUTION SUBCUTANEOUS
Qty: 30 ML | Refills: 1 | Status: SHIPPED | OUTPATIENT
Start: 2024-07-18

## 2024-07-18 RX ORDER — ATORVASTATIN CALCIUM 20 MG/1
20 TABLET, FILM COATED ORAL DAILY
Qty: 90 TABLET | Refills: 3 | Status: SHIPPED | OUTPATIENT
Start: 2024-07-18

## 2024-07-18 RX ORDER — DULAGLUTIDE 3 MG/.5ML
3 INJECTION, SOLUTION SUBCUTANEOUS
Qty: 6 ML | Refills: 3 | Status: SHIPPED | OUTPATIENT
Start: 2024-07-18

## 2024-08-28 ENCOUNTER — OFFICE VISIT (OUTPATIENT)
Dept: UROLOGY | Facility: CLINIC | Age: 64
End: 2024-08-28
Payer: COMMERCIAL

## 2024-08-28 VITALS
HEIGHT: 75 IN | HEART RATE: 67 BPM | BODY MASS INDEX: 34.47 KG/M2 | SYSTOLIC BLOOD PRESSURE: 144 MMHG | WEIGHT: 277.2 LBS | DIASTOLIC BLOOD PRESSURE: 80 MMHG | OXYGEN SATURATION: 100 % | TEMPERATURE: 97.7 F

## 2024-08-28 DIAGNOSIS — R10.9 LEFT FLANK PAIN: ICD-10-CM

## 2024-08-28 DIAGNOSIS — N20.0 NEPHROLITHIASIS: ICD-10-CM

## 2024-08-28 DIAGNOSIS — R31.9 HEMATURIA, UNSPECIFIED TYPE: Primary | ICD-10-CM

## 2024-08-28 DIAGNOSIS — N20.1 LEFT URETERAL STONE: ICD-10-CM

## 2024-08-28 LAB
SL AMB  POCT GLUCOSE, UA: NORMAL
SL AMB LEUKOCYTE ESTERASE,UA: NORMAL
SL AMB POCT BILIRUBIN,UA: NORMAL
SL AMB POCT BLOOD,UA: NORMAL
SL AMB POCT CLARITY,UA: CLEAR
SL AMB POCT COLOR,UA: YELLOW
SL AMB POCT KETONES,UA: NORMAL
SL AMB POCT NITRITE,UA: NORMAL
SL AMB POCT PH,UA: 6
SL AMB POCT SPECIFIC GRAVITY,UA: 1.03
SL AMB POCT URINE PROTEIN: NORMAL
SL AMB POCT UROBILINOGEN: 0.2

## 2024-08-28 PROCEDURE — 99213 OFFICE O/P EST LOW 20 MIN: CPT

## 2024-08-28 PROCEDURE — 81002 URINALYSIS NONAUTO W/O SCOPE: CPT

## 2024-08-28 NOTE — PROGRESS NOTES
"8/28/2024    No chief complaint on file.      Assessment and Plan    64 y.o. male manage by    Hematuria  Nephrolithiasis  Urine dip today is negative for infection or blood.  I strongly believe that the cause of his hematuria in the past was due to kidney stones.  He recently passed a stone here in July.  CT imaging did not show any additional stones.  Due to recurrent stone episodes I am going to refer him to nephrology for additional workup.  He drinks about 2 L of water per day and 2 cups of coffee.  Follow-up in 6 months      Interval HPI:    He presents today reporting doing well overall since last time I saw him.  He had a recent ER visit in early June for complaints of left-sided flank pain.  He was diagnosed with a 1 mm left UVJ calculi.  No additional urinary tract calculi were seen.  He is asymptomatic today.  Urine dip is negative for infection or blood.  Denies any reports of gross hematuria.      History of Present Illness  José Miguel Costa is a 64 y.o. male here for follow-up evaluation of hematuria.    Recently established patient initially seen by me on 2/22/2024 for evaluation of hematuria.  He reported history of kidney stones in the past and was last seen at [facility] in June of 2023 for severe right flank pain. His pain has subsided while he was in the ER and there was no CT imaging done. He was discharged home. Urine testing at the time was positive for blood. He reports his first stone episode was in July of 2021 at which time he was seen at [facility] ER. CT imaging from July 2021 showed a 2 mm stone in the left UVJ. He was able to pass that stone spontaneously     Most recently he saw his endocrinologist and mention that about 6 months ago he noticed crystallized sediment in his urine followed by passage of a large \"river rock\" like stone the size of an M&M. He states it was tan in color and hard like a rock. He denied flank pain at the time but did report left lower " quadrant abdominal pain. He denies any recurrent pain or passage of stones since. This occurred about 1-2 months after he was seen in the ER for complaints of flank pain.      His Endocrinologist Dr. Andry Riddle [facility] referred him and he had CT abdomen pelvis with and without IV contrast on 2/1/2024 which was essentially unremarkable.  There was no urinary tract calculi, suspicious renal mass, or upper tract urothelial lesions seen.  Urinary bladder was normal with no mass or calculi.  Does have cholelithiasis without findings of acute cholecystitis.   He does have a family history of bladder cancer in his father.      Former smoker as a teenager for about 10 year about 1 ppd. Negative chemical exposure history.     Based on his description I suspected that he likely passed a large stone which would account for his microscopic hematuria a few months prior.  He denied any gross hematuria.  Urine dip at the time of his initial office visit was negative for infection or blood.  Given negative CT imaging and suspicion that he likely passed a stone I did not recommend further evaluation at the time.  Instead I recommended follow-up in 6 months with repeat urine testing.        Review of Systems   Constitutional:  Negative for chills and fever.   HENT:  Negative for congestion and sore throat.    Respiratory:  Negative for cough and shortness of breath.    Cardiovascular:  Negative for chest pain and leg swelling.   Gastrointestinal:  Negative for abdominal pain, constipation and diarrhea.   Genitourinary:  Negative for difficulty urinating, dysuria, frequency, hematuria and urgency.   Musculoskeletal:  Negative for back pain and gait problem.   Skin:  Negative for wound.   Allergic/Immunologic: Negative for immunocompromised state.   Hematological:  Does not bruise/bleed easily.               Vitals  There were no vitals filed for this visit.    Physical Exam  Vitals reviewed.   Constitutional:       General: He is not  in acute distress.     Appearance: Normal appearance. He is not ill-appearing or toxic-appearing.   HENT:      Head: Normocephalic and atraumatic.   Eyes:      General: No scleral icterus.     Conjunctiva/sclera: Conjunctivae normal.   Cardiovascular:      Rate and Rhythm: Normal rate.   Pulmonary:      Effort: Pulmonary effort is normal. No respiratory distress.   Abdominal:      Tenderness: There is no right CVA tenderness or left CVA tenderness.      Hernia: No hernia is present.   Musculoskeletal:      Cervical back: Normal range of motion.      Right lower leg: No edema.      Left lower leg: No edema.   Skin:     General: Skin is warm and dry.      Coloration: Skin is not jaundiced or pale.   Neurological:      General: No focal deficit present.      Mental Status: He is alert and oriented to person, place, and time. Mental status is at baseline.      Gait: Gait normal.   Psychiatric:         Mood and Affect: Mood normal.         Behavior: Behavior normal.         Thought Content: Thought content normal.         Judgment: Judgment normal.         Past History  Past Medical History:   Diagnosis Date    Diabetes mellitus (HCC)      Social History     Socioeconomic History    Marital status: Single     Spouse name: Not on file    Number of children: Not on file    Years of education: Not on file    Highest education level: Not on file   Occupational History    Not on file   Tobacco Use    Smoking status: Never    Smokeless tobacco: Never   Vaping Use    Vaping status: Never Used   Substance and Sexual Activity    Alcohol use: Yes     Comment: ocassional    Drug use: Never    Sexual activity: Not on file   Other Topics Concern    Not on file   Social History Narrative    Not on file     Social Determinants of Health     Financial Resource Strain: Not on file   Food Insecurity: Not on file   Transportation Needs: Not on file   Physical Activity: Not on file   Stress: Not on file   Social Connections: Unknown  (6/18/2024)    Received from [facility]     How often do you feel lonely or isolated from those around you? (Adult - for ages 18 years and over): Not on file   Intimate Partner Violence: Not on file   Housing Stability: Not on file     Social History     Tobacco Use   Smoking Status Never   Smokeless Tobacco Never     Family History   Problem Relation Age of Onset    Cancer Father         bladder cancer    No Known Problems Mother        The following portions of the patient's history were reviewed and updated as appropriate allergies, current medications, past medical history, past social history, past surgical history and problem list    Imaging:    Results  No results found for this or any previous visit (from the past 1 hour(s)).]  Lab Results   Component Value Date    PSA 0.4 05/15/2019     Lab Results   Component Value Date    CALCIUM 8.9 07/18/2024    K 4.2 07/18/2024    CO2 23 07/18/2024     07/18/2024    BUN 15 07/18/2024    CREATININE 0.92 07/18/2024     Lab Results   Component Value Date    WBC 8.85 07/01/2024    HGB 14.0 07/01/2024    HCT 41.6 07/01/2024    MCV 85 07/01/2024     07/01/2024       Please Note:  Voice dictation software has been used to create this document. There may be inadvertent transcriptions errors.     MARYAM Garnett 08/28/24

## 2024-09-03 ENCOUNTER — OFFICE VISIT (OUTPATIENT)
Dept: ENDOCRINOLOGY | Facility: CLINIC | Age: 64
End: 2024-09-03
Payer: COMMERCIAL

## 2024-09-03 VITALS
DIASTOLIC BLOOD PRESSURE: 78 MMHG | TEMPERATURE: 98 F | BODY MASS INDEX: 34.27 KG/M2 | SYSTOLIC BLOOD PRESSURE: 132 MMHG | HEART RATE: 65 BPM | WEIGHT: 275.6 LBS | HEIGHT: 75 IN

## 2024-09-03 DIAGNOSIS — E11.65 TYPE 2 DIABETES MELLITUS WITH HYPERGLYCEMIA, WITH LONG-TERM CURRENT USE OF INSULIN (HCC): Primary | ICD-10-CM

## 2024-09-03 DIAGNOSIS — Z79.4 TYPE 2 DIABETES MELLITUS WITH HYPERGLYCEMIA, WITH LONG-TERM CURRENT USE OF INSULIN (HCC): Primary | ICD-10-CM

## 2024-09-03 DIAGNOSIS — E78.2 MIXED HYPERLIPIDEMIA: ICD-10-CM

## 2024-09-03 PROCEDURE — 99214 OFFICE O/P EST MOD 30 MIN: CPT | Performed by: STUDENT IN AN ORGANIZED HEALTH CARE EDUCATION/TRAINING PROGRAM

## 2024-09-03 NOTE — PROGRESS NOTES
Ambulatory Visit  Name: José Miguel Costa      : 1960      MRN: [id number]  Encounter Provider: Andry Devine DO  Encounter Date: 9/3/2024   Encounter department: [facility]    Assessment & Plan   1. Type 2 diabetes mellitus with hyperglycemia, with long-term current use of insulin (HCC)  Assessment & Plan:  Good control by A1c. Will continue off trulicity, although I am skeptical this is related to his kidney stones. Continue MTF and lantus. Recommend initiation of CGM, which he has available at home. Last DM eye >2y ago, I recommended patient update this study. Will plan 4-mo follow up with labs    Orders:  -     Comprehensive metabolic panel; Future; Expected date: 2025  -     Hemoglobin A1C; Future; Expected date: 2025  -     Albumin / creatinine urine ratio; Future; Expected date: 2025  -     Lipid Panel with Direct LDL reflex; Future; Expected date: 2025  2. Mixed hyperlipidemia  Assessment & Plan:  Continue statin.       RTC 4-mo    History of Present Illness     José Miguel Costa is a 64 y.o. male who presents in follow up of T2D.     Presently, taking lantus 28 units nightly, metformin 1g bid. He is off trulicity for past several months. He mentions recurrent kidney stones, and was advised of a possibility of a relationship to trulicity. He has been referred to nephrology for additional eval for kidney stones.     For past couple of weeks, blood sugars have been up and down. He has been cutting down his scripts. He is reporting  - 160 on testing. He has CGM at home, but has not pursued use yet. He does note that once or so per year he experiences intractable hyperglycemia. No hyperglycemic sx, no hypoglycemia.     Review of Systems   Constitutional:  Negative for diaphoresis and unexpected weight change.   Gastrointestinal:  Negative for nausea and vomiting.   Endocrine: Negative for polydipsia and polyuria.   All other  "systems reviewed and are negative.      Objective     /78   Pulse 65   Temp 98 °F (36.7 °C)   Ht 6' 3\" (1.905 m)   Wt 125 kg (275 lb 9.6 oz)   BMI 34.45 kg/m²     Physical Exam  Vitals reviewed.   Constitutional:       General: He is not in acute distress.     Appearance: Normal appearance.   HENT:      Head: Normocephalic and atraumatic.      Nose: Nose normal.   Eyes:      General: No scleral icterus.     Conjunctiva/sclera: Conjunctivae normal.   Cardiovascular:      Pulses: no weak pulses.           Dorsalis pedis pulses are 2+ on the right side and 2+ on the left side.   Pulmonary:      Effort: Pulmonary effort is normal. No respiratory distress.   Musculoskeletal:         General: No deformity.      Cervical back: Normal range of motion.   Feet:      Right foot:      Skin integrity: No ulcer, skin breakdown, erythema, warmth, callus or dry skin.      Left foot:      Skin integrity: No ulcer, skin breakdown, erythema, warmth, callus or dry skin.   Skin:     General: Skin is warm and dry.   Neurological:      Mental Status: He is alert.   Psychiatric:         Mood and Affect: Mood normal.         Behavior: Behavior normal.     Diabetic Foot Exam    Patient's shoes and socks removed.    Right Foot/Ankle   Right Foot Inspection  Skin Exam: skin normal and skin intact. No dry skin, no warmth, no callus, no erythema, no maceration, no abnormal color, no pre-ulcer, no ulcer and no callus.     Toe Exam: ROM and strength within normal limits.     Sensory   Vibration: intact  Monofilament testing: intact    Vascular  The right DP pulse is 2+.     Left Foot/Ankle  Left Foot Inspection  Skin Exam: skin normal and skin intact. No dry skin, no warmth, no erythema, no maceration, normal color, no pre-ulcer, no ulcer and no callus.     Toe Exam: ROM and strength within normal limits.     Sensory   Vibration: intact  Monofilament testing: intact    Vascular  The left DP pulse is 2+.     Assign Risk Category  No " deformity present  No loss of protective sensation  No weak pulses  Risk: 0    Administrative Statements

## 2024-09-03 NOTE — ASSESSMENT & PLAN NOTE
Good control by A1c. Will continue off trulicity, although I am skeptical this is related to his kidney stones. Continue MTF and lantus. Recommend initiation of CGM, which he has available at home. Last DM eye >2y ago, I recommended patient update this study. Will plan 4-mo follow up with labs

## 2024-09-13 ENCOUNTER — VBI (OUTPATIENT)
Dept: ADMINISTRATIVE | Facility: OTHER | Age: 64
End: 2024-09-13

## 2024-09-13 NOTE — TELEPHONE ENCOUNTER
09/13/24 10:07 AM     Chart reviewed for Diabetic Eye Exam was/were not submitted to the patient's insurance.     Dahlia Pollock MA   [facility]

## 2024-11-22 ENCOUNTER — VBI (OUTPATIENT)
Dept: ADMINISTRATIVE | Facility: OTHER | Age: 64
End: 2024-11-22

## 2024-11-22 NOTE — TELEPHONE ENCOUNTER
11/22/24 9:28 AM     Chart reviewed for Diabetic Eye Exam was/were not submitted to the patient's insurance.     Dahlia Pollock MA   [facility]

## 2024-11-27 ENCOUNTER — TELEPHONE (OUTPATIENT)
Age: 64
End: 2024-11-27

## 2024-11-27 DIAGNOSIS — N20.0 NEPHROLITHIASIS: Primary | ICD-10-CM

## 2024-11-27 NOTE — TELEPHONE ENCOUNTER
Ritesh ordered 11/27/2024.    Instructions mailed to patient.      I called and left a VM for José Miguel regarding completing blood and urine studies prior to upcoming appt on 12/23/2024.  I also informed him of the Ritesh also ordered and arriving by mail.    I requested a call back if any questions or concerns.

## 2025-01-09 ENCOUNTER — OFFICE VISIT (OUTPATIENT)
Dept: ENDOCRINOLOGY | Facility: CLINIC | Age: 65
End: 2025-01-09
Payer: COMMERCIAL

## 2025-01-09 VITALS
HEIGHT: 75 IN | WEIGHT: 273.2 LBS | TEMPERATURE: 97.1 F | HEART RATE: 57 BPM | BODY MASS INDEX: 33.97 KG/M2 | SYSTOLIC BLOOD PRESSURE: 138 MMHG | DIASTOLIC BLOOD PRESSURE: 78 MMHG

## 2025-01-09 DIAGNOSIS — I10 HYPERTENSION GOAL BP (BLOOD PRESSURE) < 140/90: ICD-10-CM

## 2025-01-09 DIAGNOSIS — E66.9 OBESITY (BMI 30-39.9): ICD-10-CM

## 2025-01-09 DIAGNOSIS — N20.0 NEPHROLITHIASIS: ICD-10-CM

## 2025-01-09 DIAGNOSIS — Z79.4 TYPE 2 DIABETES MELLITUS WITH HYPERGLYCEMIA, WITH LONG-TERM CURRENT USE OF INSULIN (HCC): Primary | ICD-10-CM

## 2025-01-09 DIAGNOSIS — E11.65 TYPE 2 DIABETES MELLITUS WITH HYPERGLYCEMIA, WITH LONG-TERM CURRENT USE OF INSULIN (HCC): Primary | ICD-10-CM

## 2025-01-09 DIAGNOSIS — E78.2 MIXED HYPERLIPIDEMIA: ICD-10-CM

## 2025-01-09 PROCEDURE — 99214 OFFICE O/P EST MOD 30 MIN: CPT | Performed by: STUDENT IN AN ORGANIZED HEALTH CARE EDUCATION/TRAINING PROGRAM

## 2025-01-09 RX ORDER — INSULIN GLARGINE 100 [IU]/ML
INJECTION, SOLUTION SUBCUTANEOUS
Qty: 45 ML | Refills: 1 | Status: SHIPPED | OUTPATIENT
Start: 2025-01-09

## 2025-01-09 RX ORDER — DULAGLUTIDE 0.75 MG/.5ML
0.75 INJECTION, SOLUTION SUBCUTANEOUS WEEKLY
Qty: 2 ML | Refills: 3 | Status: SHIPPED | OUTPATIENT
Start: 2025-01-09

## 2025-01-09 NOTE — PROGRESS NOTES
Name: José Miguel Costa      : 1960      MRN: [id number]  Encounter Provider: Andry Devine DO  Encounter Date: 2025   Encounter department: [facility]  :  Assessment & Plan  Type 2 diabetes mellitus with hyperglycemia, with long-term current use of insulin (HCC)  Uncertain control, worsening by self-recall. Recommend CBG monitoring AC +/- HS. Encouraged use of CGM, which patient has available at home. May continue lantus with goal to titrate for FBG <110 -130, cw metformin. I recommended resumption of GLP. Patient previously well on trulicity, but is under belief this may have contributed to kidney stone risk. I advised that I am not aware of any association with glp therapy and nephrolithiasis risk, nor is it apparent on a literature review. I am recommending resume trulicity 0.75 mg weekly with additional adjustments in future as indicated. Will f/u in 3-mo with labs  Lab Results   Component Value Date    HGBA1C 6.8 (H) 2024     Orders:  •  Dulaglutide (Trulicity) 0.75 MG/0.5ML SOAJ; Inject 0.75 mg under the skin once a week  •  Comprehensive metabolic panel; Future  •  Hemoglobin A1C; Future  •  Lipid Panel with Direct LDL reflex; Future  •  Albumin / creatinine urine ratio; Future  •  Insulin Glargine Solostar (Lantus SoloStar) 100 UNIT/ML SOPN; E11.65 inject 40 units daily. Dispense for TDD 50 units    Mixed hyperlipidemia  Lipitor 20 mg daily       Hypertension goal BP (blood pressure) < 140/90  Fair control       Obesity (BMI 30-39.9)         Nephrolithiasis           RTC 3-mo    History of Present Illness   HPI  José Miguel Costa is a 64 y.o. male who presents in follow up of T2D.     He notes blood sugars have been high, sometimes in 300s. He feels this can be unpredictable, and states that every couple of years he goes through these spells. He is on lantus 40 units daily and metformin. He was previously on trulicity with A1c <7%, but notes this  "was stopped due to kidney stones.     He is not routinely testing CBG. He has yakelin CGM at home, but has not pursued wear.     He is on ARB and statin.         Review of Systems   All other systems reviewed and are negative.         Objective   /78 (Patient Position: Sitting, Cuff Size: Standard)   Pulse 57   Temp (!) 97.1 °F (36.2 °C)   Ht 6' 3\" (1.905 m)   Wt 124 kg (273 lb 3.2 oz)   BMI 34.15 kg/m²      Physical Exam  Vitals reviewed.   Constitutional:       General: He is not in acute distress.     Appearance: Normal appearance.   HENT:      Head: Normocephalic and atraumatic.      Nose: Nose normal.   Eyes:      General: No scleral icterus.     Conjunctiva/sclera: Conjunctivae normal.   Pulmonary:      Effort: Pulmonary effort is normal. No respiratory distress.   Musculoskeletal:         General: No deformity.      Cervical back: Normal range of motion.   Skin:     General: Skin is warm and dry.   Neurological:      Mental Status: He is alert.   Psychiatric:         Mood and Affect: Mood normal.         Behavior: Behavior normal.       Component      Latest Ref Rng 7/18/2024   Sodium      135 - 147 mmol/L 140    Potassium      3.5 - 5.3 mmol/L 4.2    Chloride      96 - 108 mmol/L 108    Carbon Dioxide      21 - 32 mmol/L 23    ANION GAP      4 - 13 mmol/L 9    BUN      5 - 25 mg/dL 15    Creatinine      0.60 - 1.30 mg/dL 0.92    GLUCOSE, FASTING      65 - 99 mg/dL 127 (H)    Calcium      8.4 - 10.2 mg/dL 8.9    GFR, Calculated      ml/min/1.73sq m 88    Cholesterol      See Comment mg/dL 145    Triglycerides      See Comment mg/dL 95    HDL      >=40 mg/dL 44    LDL Calculated      0 - 100 mg/dL 82    EXT Creatinine Urine      Reference range not established. mg/dL 174.7    Albumin,U,Random      <20.0 mg/L 13.2    Albumin Creat Ratio      0 - 30 mg/g creatinine 8    Hemoglobin A1C      Normal 4.0-5.6%; PreDiabetic 5.7-6.4%; Diabetic >=6.5%; Glycemic control for adults with diabetes <7.0% % 6.8 (H)  "   eAG, EST AVG Glucose      mg/dl 148       Legend:  (H) High

## 2025-01-09 NOTE — ASSESSMENT & PLAN NOTE
Uncertain control, worsening by self-recall. Recommend CBG monitoring AC +/- HS. Encouraged use of CGM, which patient has available at home. May continue lantus with goal to titrate for FBG <110 -130, cw metformin. I recommended resumption of GLP. Patient previously well on trulicity, but is under belief this may have contributed to kidney stone risk. I advised that I am not aware of any association with glp therapy and nephrolithiasis risk, nor is it apparent on a literature review. I am recommending resume trulicity 0.75 mg weekly with additional adjustments in future as indicated. Will f/u in 3-mo with labs  Lab Results   Component Value Date    HGBA1C 6.8 (H) 07/18/2024     Orders:  •  Dulaglutide (Trulicity) 0.75 MG/0.5ML SOAJ; Inject 0.75 mg under the skin once a week  •  Comprehensive metabolic panel; Future  •  Hemoglobin A1C; Future  •  Lipid Panel with Direct LDL reflex; Future  •  Albumin / creatinine urine ratio; Future  •  Insulin Glargine Solostar (Lantus SoloStar) 100 UNIT/ML SOPN; E11.65 inject 40 units daily. Dispense for TDD 50 units

## 2025-01-09 NOTE — PATIENT INSTRUCTIONS
Check sugars 3x daily    Before meals, bedtime    Goal Blood Sugars:   Premeal , even better <110  2hr after a meal <180, even better <140  A1C <7%, even better <6.5%.    Aim for 45g carbohydrates with meals  15-20g with snacks    Goal exercise is 30 minutes daily, most days of the week    Check your sugar prior to driving. Your sugar should be > 90 prior to driving. If sugar is < 90 have a snack and recheck sugar. While driving, if you have symptoms of low sugar such as sweating, tremors, vision changes, immediately pull off the road and check sugar and treat. Severe low sugars require report to the DVM by law.     Please have labs done before next visit    Bring your meter or logbook with you each visit    Return appointment 3 months

## 2025-02-11 ENCOUNTER — TELEPHONE (OUTPATIENT)
Age: 65
End: 2025-02-11

## 2025-02-11 NOTE — TELEPHONE ENCOUNTER
I called and left a VM for José Miguel regarding completing blood and urine studies prior to upcoming appt on 02/18/2025

## 2025-02-15 DIAGNOSIS — Z79.4 TYPE 2 DIABETES MELLITUS WITH HYPERGLYCEMIA, WITH LONG-TERM CURRENT USE OF INSULIN (HCC): ICD-10-CM

## 2025-02-15 DIAGNOSIS — E11.65 TYPE 2 DIABETES MELLITUS WITH HYPERGLYCEMIA, WITH LONG-TERM CURRENT USE OF INSULIN (HCC): ICD-10-CM

## 2025-02-17 RX ORDER — FLURBIPROFEN SODIUM 0.3 MG/ML
SOLUTION/ DROPS OPHTHALMIC 4 TIMES DAILY
Qty: 100 EACH | Refills: 3 | Status: SHIPPED | OUTPATIENT
Start: 2025-02-17

## 2025-02-28 ENCOUNTER — RESULTS FOLLOW-UP (OUTPATIENT)
Dept: FAMILY MEDICINE CLINIC | Facility: CLINIC | Age: 65
End: 2025-02-28

## 2025-02-28 ENCOUNTER — OFFICE VISIT (OUTPATIENT)
Dept: FAMILY MEDICINE CLINIC | Facility: CLINIC | Age: 65
End: 2025-02-28
Payer: COMMERCIAL

## 2025-02-28 VITALS
HEIGHT: 75 IN | SYSTOLIC BLOOD PRESSURE: 118 MMHG | HEART RATE: 60 BPM | WEIGHT: 269.2 LBS | DIASTOLIC BLOOD PRESSURE: 82 MMHG | BODY MASS INDEX: 33.47 KG/M2 | OXYGEN SATURATION: 99 % | TEMPERATURE: 96.8 F

## 2025-02-28 DIAGNOSIS — E11.65 TYPE 2 DIABETES MELLITUS WITH HYPERGLYCEMIA, WITH LONG-TERM CURRENT USE OF INSULIN (HCC): Primary | ICD-10-CM

## 2025-02-28 DIAGNOSIS — I10 HYPERTENSION GOAL BP (BLOOD PRESSURE) < 140/90: ICD-10-CM

## 2025-02-28 DIAGNOSIS — E66.9 OBESITY (BMI 30-39.9): ICD-10-CM

## 2025-02-28 DIAGNOSIS — E78.2 MIXED HYPERLIPIDEMIA: ICD-10-CM

## 2025-02-28 DIAGNOSIS — Z12.11 SCREENING FOR COLON CANCER: ICD-10-CM

## 2025-02-28 DIAGNOSIS — R80.9 MICROALBUMINURIA: ICD-10-CM

## 2025-02-28 DIAGNOSIS — L98.9 SKIN LESION: ICD-10-CM

## 2025-02-28 DIAGNOSIS — Z79.4 TYPE 2 DIABETES MELLITUS WITH HYPERGLYCEMIA, WITH LONG-TERM CURRENT USE OF INSULIN (HCC): Primary | ICD-10-CM

## 2025-02-28 PROCEDURE — 99214 OFFICE O/P EST MOD 30 MIN: CPT | Performed by: FAMILY MEDICINE

## 2025-02-28 NOTE — ASSESSMENT & PLAN NOTE
Continue statin and monitor.  Upcoming fasting lab work has already been placed by endocrinology

## 2025-02-28 NOTE — ASSESSMENT & PLAN NOTE
Lab Results   Component Value Date    HGBA1C 6.8 (H) 07/18/2024   Patient is followed by endocrinology Dr. Devine.    Orders:    IR[facility] Diabetic eye exam

## 2025-02-28 NOTE — ASSESSMENT & PLAN NOTE
Patient is on Trulicity for diabetic management.  He has been losing weight.  Discussed dietary and lifestyle modification.

## 2025-02-28 NOTE — PROGRESS NOTES
Name: José Miguel Costa      : 1960      MRN: [id number]  Encounter Provider: Yosvany Holland DO  Encounter Date: 2025   Encounter department: [facility]  :  Assessment & Plan  Type 2 diabetes mellitus with hyperglycemia, with long-term current use of insulin (HCC)    Lab Results   Component Value Date    HGBA1C 6.8 (H) 2024   Patient is followed by endocrinology Dr. Devine.    Orders:    [facility] Diabetic eye exam    Hypertension goal BP (blood pressure) < 140/90  Currently controlled.  Continue current medications.       Microalbuminuria  Continue current medications and monitor.       Mixed hyperlipidemia  Continue statin and monitor.  Upcoming fasting lab work has already been placed by endocrinology       Obesity (BMI 30-39.9)    Patient is on Trulicity for diabetic management.  He has been losing weight.  Discussed dietary and lifestyle modification.       Screening for colon cancer    Orders:    Cologuard    Skin lesion    Orders:    Ambulatory Referral to Dermatology; Future  The lesion in question almost appears like a seborrheic keratosis.  Considering it is not resolving, I am referring to dermatology for evaluation and treatment         History of Present Illness   Patient is a pleasant 64-year-old male who presents today to establish care with me.  The patient is being followed by endocrinology, Dr. Devine for diabetic management.  He is also being seen by urology,  due to his history of hematuria with nephrolithiasis.  This has landed him in the hospital several times.  The exact type of kidney stone has not been determined.  At the last urology visit, it was mentioned to refer to nephrology for an evaluation.  This will be scheduled.  There was some concern that the urologist expressed that perhaps the Trulicity which the patient gives himself once a week for diabetic management may be an etiology to the recurrent nephrolithiasis.  The endocrinologist, does not  "believe the Trulicity could cause the symptoms.  Or at least it is not a common side effect.  The patient is currently off Trulicity until he speaks with the nephrologist.    In the fall, the patient got poked in the back of his head and suffered a superficial injury.  That injury is not healing.  I will evaluate that today.  Additionally, comprehensive blood work panel has been ordered by both specialties.  Therefore, I will not duplicate any lab work.      Review of Systems   Constitutional:  Negative for chills and fever.   HENT:  Negative for ear pain and sore throat.    Eyes:  Negative for pain and visual disturbance.   Respiratory:  Negative for cough and shortness of breath.    Cardiovascular:  Negative for chest pain and palpitations.   Gastrointestinal:  Negative for abdominal pain and vomiting.   Genitourinary:  Negative for dysuria and hematuria.   Musculoskeletal:  Negative for arthralgias and back pain.   Skin:  Negative for color change and rash.        Superficial wound on scalp, not healing   Neurological:  Negative for seizures and syncope.   Psychiatric/Behavioral: Negative.     All other systems reviewed and are negative.      Objective   /82   Pulse 60   Temp (!) 96.8 °F (36 °C)   Ht 6' 3\" (1.905 m)   Wt 122 kg (269 lb 3.2 oz)   SpO2 99%   BMI 33.65 kg/m²      Physical Exam  Vitals and nursing note reviewed.   Constitutional:       General: He is not in acute distress.     Appearance: Normal appearance. He is well-developed.   HENT:      Head: Normocephalic and atraumatic.      Right Ear: Tympanic membrane normal.      Left Ear: Tympanic membrane normal.      Mouth/Throat:      Mouth: Mucous membranes are moist.      Pharynx: Oropharynx is clear.   Eyes:      Extraocular Movements: Extraocular movements intact.      Conjunctiva/sclera: Conjunctivae normal.      Pupils: Pupils are equal, round, and reactive to light.   Cardiovascular:      Rate and Rhythm: Normal rate and regular " rhythm.      Heart sounds: Normal heart sounds. No murmur heard.     No friction rub.   Pulmonary:      Effort: Pulmonary effort is normal. No respiratory distress.      Breath sounds: Normal breath sounds.   Abdominal:      General: Bowel sounds are normal.      Palpations: Abdomen is soft.      Tenderness: There is no abdominal tenderness. There is no guarding or rebound.      Hernia: No hernia is present.   Musculoskeletal:         General: No swelling.      Cervical back: Neck supple.   Skin:     General: Skin is warm and dry.      Capillary Refill: Capillary refill takes less than 2 seconds.      Comments: Superficial keratotic lesion visualized in the superior occipital area.  No active bleeding.  Slightly irregular and elevated.  It does not appear infected.  There is no purulent drainage.   Neurological:      General: No focal deficit present.      Mental Status: He is alert and oriented to person, place, and time.      Gait: Gait normal.   Psychiatric:         Mood and Affect: Mood normal.         Behavior: Behavior normal.         Thought Content: Thought content normal.

## 2025-02-28 NOTE — ASSESSMENT & PLAN NOTE
Orders:    Ambulatory Referral to Dermatology; Future  The lesion in question almost appears like a seborrheic keratosis.  Considering it is not resolving, I am referring to dermatology for evaluation and treatment

## 2025-03-18 ENCOUNTER — APPOINTMENT (OUTPATIENT)
Dept: LAB | Facility: MEDICAL CENTER | Age: 65
End: 2025-03-18
Payer: COMMERCIAL

## 2025-03-18 DIAGNOSIS — Z79.4 TYPE 2 DIABETES MELLITUS WITH HYPERGLYCEMIA, WITH LONG-TERM CURRENT USE OF INSULIN (HCC): ICD-10-CM

## 2025-03-18 DIAGNOSIS — E11.65 TYPE 2 DIABETES MELLITUS WITH HYPERGLYCEMIA, WITH LONG-TERM CURRENT USE OF INSULIN (HCC): ICD-10-CM

## 2025-03-18 DIAGNOSIS — N20.0 NEPHROLITHIASIS: ICD-10-CM

## 2025-03-18 LAB
ALBUMIN SERPL BCG-MCNC: 4 G/DL (ref 3.5–5)
ALP SERPL-CCNC: 65 U/L (ref 34–104)
ALT SERPL W P-5'-P-CCNC: 18 U/L (ref 7–52)
ANION GAP SERPL CALCULATED.3IONS-SCNC: 11 MMOL/L (ref 4–13)
AST SERPL W P-5'-P-CCNC: 15 U/L (ref 13–39)
BACTERIA UR QL AUTO: ABNORMAL /HPF
BASOPHILS # BLD AUTO: 0.04 THOUSANDS/ÂΜL (ref 0–0.1)
BASOPHILS NFR BLD AUTO: 1 % (ref 0–1)
BILIRUB SERPL-MCNC: 0.52 MG/DL (ref 0.2–1)
BILIRUB UR QL STRIP: NEGATIVE
BUN SERPL-MCNC: 15 MG/DL (ref 5–25)
CALCIUM SERPL-MCNC: 9.4 MG/DL (ref 8.4–10.2)
CHLORIDE SERPL-SCNC: 104 MMOL/L (ref 96–108)
CHOLEST SERPL-MCNC: 223 MG/DL (ref ?–200)
CLARITY UR: ABNORMAL
CO2 SERPL-SCNC: 24 MMOL/L (ref 21–32)
COLOR UR: ABNORMAL
CREAT SERPL-MCNC: 0.91 MG/DL (ref 0.6–1.3)
CREAT UR-MCNC: 147.7 MG/DL
CREAT UR-MCNC: 147.7 MG/DL
EOSINOPHIL # BLD AUTO: 0.1 THOUSAND/ÂΜL (ref 0–0.61)
EOSINOPHIL NFR BLD AUTO: 2 % (ref 0–6)
ERYTHROCYTE [DISTWIDTH] IN BLOOD BY AUTOMATED COUNT: 12.7 % (ref 11.6–15.1)
EST. AVERAGE GLUCOSE BLD GHB EST-MCNC: 280 MG/DL
GFR SERPL CREATININE-BSD FRML MDRD: 89 ML/MIN/1.73SQ M
GLUCOSE P FAST SERPL-MCNC: 247 MG/DL (ref 65–99)
GLUCOSE UR STRIP-MCNC: ABNORMAL MG/DL
HBA1C MFR BLD: 11.4 %
HCT VFR BLD AUTO: 43.1 % (ref 36.5–49.3)
HDLC SERPL-MCNC: 44 MG/DL
HGB BLD-MCNC: 14.6 G/DL (ref 12–17)
HGB UR QL STRIP.AUTO: NEGATIVE
IMM GRANULOCYTES # BLD AUTO: 0.02 THOUSAND/UL (ref 0–0.2)
IMM GRANULOCYTES NFR BLD AUTO: 0 % (ref 0–2)
KETONES UR STRIP-MCNC: NEGATIVE MG/DL
LDLC SERPL CALC-MCNC: 152 MG/DL (ref 0–100)
LEUKOCYTE ESTERASE UR QL STRIP: ABNORMAL
LYMPHOCYTES # BLD AUTO: 1.82 THOUSANDS/ÂΜL (ref 0.6–4.47)
LYMPHOCYTES NFR BLD AUTO: 29 % (ref 14–44)
MAGNESIUM SERPL-MCNC: 2.1 MG/DL (ref 1.9–2.7)
MCH RBC QN AUTO: 28.4 PG (ref 26.8–34.3)
MCHC RBC AUTO-ENTMCNC: 33.9 G/DL (ref 31.4–37.4)
MCV RBC AUTO: 84 FL (ref 82–98)
MICROALBUMIN UR-MCNC: 52.1 MG/L
MICROALBUMIN/CREAT 24H UR: 35 MG/G CREATININE (ref 0–30)
MONOCYTES # BLD AUTO: 0.46 THOUSAND/ÂΜL (ref 0.17–1.22)
MONOCYTES NFR BLD AUTO: 7 % (ref 4–12)
MUCOUS THREADS UR QL AUTO: ABNORMAL
NEUTROPHILS # BLD AUTO: 3.82 THOUSANDS/ÂΜL (ref 1.85–7.62)
NEUTS SEG NFR BLD AUTO: 61 % (ref 43–75)
NITRITE UR QL STRIP: NEGATIVE
NON-SQ EPI CELLS URNS QL MICRO: ABNORMAL /HPF
NRBC BLD AUTO-RTO: 0 /100 WBCS
PH UR STRIP.AUTO: 5.5 [PH]
PHOSPHATE SERPL-MCNC: 3.3 MG/DL (ref 2.3–4.1)
PLATELET # BLD AUTO: 234 THOUSANDS/UL (ref 149–390)
PMV BLD AUTO: 11.5 FL (ref 8.9–12.7)
POTASSIUM SERPL-SCNC: 4.6 MMOL/L (ref 3.5–5.3)
PROT SERPL-MCNC: 6.3 G/DL (ref 6.4–8.4)
PROT UR STRIP-MCNC: ABNORMAL MG/DL
PROT UR-MCNC: 28.5 MG/DL
PROT/CREAT UR: 0.2 MG/G{CREAT} (ref 0–0.1)
PTH-INTACT SERPL-MCNC: 57.1 PG/ML (ref 12–88)
RBC # BLD AUTO: 5.14 MILLION/UL (ref 3.88–5.62)
RBC #/AREA URNS AUTO: ABNORMAL /HPF
SODIUM SERPL-SCNC: 139 MMOL/L (ref 135–147)
SP GR UR STRIP.AUTO: 1.03 (ref 1–1.03)
TRIGL SERPL-MCNC: 133 MG/DL (ref ?–150)
UROBILINOGEN UR STRIP-ACNC: <2 MG/DL
WBC # BLD AUTO: 6.26 THOUSAND/UL (ref 4.31–10.16)
WBC #/AREA URNS AUTO: ABNORMAL /HPF

## 2025-03-18 PROCEDURE — 81001 URINALYSIS AUTO W/SCOPE: CPT

## 2025-03-18 PROCEDURE — 82570 ASSAY OF URINE CREATININE: CPT

## 2025-03-18 PROCEDURE — 80053 COMPREHEN METABOLIC PANEL: CPT

## 2025-03-18 PROCEDURE — 80061 LIPID PANEL: CPT

## 2025-03-18 PROCEDURE — 84100 ASSAY OF PHOSPHORUS: CPT

## 2025-03-18 PROCEDURE — 83970 ASSAY OF PARATHORMONE: CPT

## 2025-03-18 PROCEDURE — 83036 HEMOGLOBIN GLYCOSYLATED A1C: CPT

## 2025-03-18 PROCEDURE — 83735 ASSAY OF MAGNESIUM: CPT

## 2025-03-18 PROCEDURE — 84156 ASSAY OF PROTEIN URINE: CPT

## 2025-03-18 PROCEDURE — 85025 COMPLETE CBC W/AUTO DIFF WBC: CPT

## 2025-03-18 PROCEDURE — 82043 UR ALBUMIN QUANTITATIVE: CPT

## 2025-03-18 PROCEDURE — 36415 COLL VENOUS BLD VENIPUNCTURE: CPT

## 2025-03-19 ENCOUNTER — RESULTS FOLLOW-UP (OUTPATIENT)
Dept: ENDOCRINOLOGY | Facility: CLINIC | Age: 65
End: 2025-03-19

## 2025-03-20 DIAGNOSIS — E11.65 TYPE 2 DIABETES MELLITUS WITH HYPERGLYCEMIA, WITH LONG-TERM CURRENT USE OF INSULIN (HCC): ICD-10-CM

## 2025-03-20 DIAGNOSIS — Z79.4 TYPE 2 DIABETES MELLITUS WITH HYPERGLYCEMIA, WITH LONG-TERM CURRENT USE OF INSULIN (HCC): ICD-10-CM

## 2025-03-20 RX ORDER — DULAGLUTIDE 0.75 MG/.5ML
0.75 INJECTION, SOLUTION SUBCUTANEOUS WEEKLY
Qty: 2 ML | Refills: 3 | Status: SHIPPED | OUTPATIENT
Start: 2025-03-20

## 2025-04-02 ENCOUNTER — TELEPHONE (OUTPATIENT)
Age: 65
End: 2025-04-02

## 2025-04-02 DIAGNOSIS — N20.0 NEPHROLITHIASIS: Primary | ICD-10-CM

## 2025-04-02 NOTE — TELEPHONE ENCOUNTER
Patient last seen with Rob on 8/28/24 in [place].     Pt was referred to see  [facility] but his appt got canceled multiple times so they are not scheduled on 4/7 with [facility].    Pt's wife requesting to fax referral, last office note and any lab results to [phone] as soon as possible due to his appt coming up on Monday.    She also mentioned to put on the top of the fax that he has an appt on 4/7 in the AM.

## 2025-04-03 NOTE — TELEPHONE ENCOUNTER
Referral faxed along with office notes, imaging, and labs. Fax confirmation received. Called and LMOM for patients PALOMO Fong making her aware.

## 2025-04-03 NOTE — TELEPHONE ENCOUNTER
Patient MATY Fong called to confirm information was sent to [facility].  Relayed message from encounter below.   Referral faxed along with office notes, imaging, and labs. Fax confirmation received. Called and LMOM for patients PALOMO Fong making her aware.   She verbalized understanding and is very thankful. No further action needed.

## 2025-04-11 ENCOUNTER — OFFICE VISIT (OUTPATIENT)
Dept: ENDOCRINOLOGY | Facility: CLINIC | Age: 65
End: 2025-04-11
Payer: COMMERCIAL

## 2025-04-11 VITALS
HEART RATE: 71 BPM | DIASTOLIC BLOOD PRESSURE: 74 MMHG | SYSTOLIC BLOOD PRESSURE: 132 MMHG | TEMPERATURE: 96.4 F | WEIGHT: 274.8 LBS | BODY MASS INDEX: 34.17 KG/M2 | HEIGHT: 75 IN

## 2025-04-11 DIAGNOSIS — I10 HYPERTENSION GOAL BP (BLOOD PRESSURE) < 140/90: ICD-10-CM

## 2025-04-11 DIAGNOSIS — Z79.4 TYPE 2 DIABETES MELLITUS WITH HYPERGLYCEMIA, WITH LONG-TERM CURRENT USE OF INSULIN (HCC): Primary | ICD-10-CM

## 2025-04-11 DIAGNOSIS — N20.0 KIDNEY STONES: ICD-10-CM

## 2025-04-11 DIAGNOSIS — E78.2 MIXED HYPERLIPIDEMIA: ICD-10-CM

## 2025-04-11 DIAGNOSIS — E11.65 TYPE 2 DIABETES MELLITUS WITH HYPERGLYCEMIA, WITH LONG-TERM CURRENT USE OF INSULIN (HCC): Primary | ICD-10-CM

## 2025-04-11 PROCEDURE — 99214 OFFICE O/P EST MOD 30 MIN: CPT | Performed by: STUDENT IN AN ORGANIZED HEALTH CARE EDUCATION/TRAINING PROGRAM

## 2025-04-11 RX ORDER — TIRZEPATIDE 5 MG/.5ML
5 INJECTION, SOLUTION SUBCUTANEOUS WEEKLY
Qty: 2 ML | Refills: 3 | Status: SHIPPED | OUTPATIENT
Start: 2025-04-11 | End: 2025-04-15 | Stop reason: ALTCHOICE

## 2025-04-11 NOTE — PROGRESS NOTES
Name: José Miguel Costa      : 1960      MRN: [id number]  Encounter Provider: Andry Devine DO  Encounter Date: 2025   Encounter department: [facility]  Chief Complaint: Type 2 diabetes mellitus   :  Assessment & Plan  Type 2 diabetes mellitus with hyperglycemia, with long-term current use of insulin (HCC)    Lab Results   Component Value Date    HGBA1C 11.4 (H) 2025     The cause of his hyperglycemia could be poor dietary patterns including late eating  Will switch Trulicity to Mounjaro 5 mg weekly. Counseled on side effects.   Continue Lantus 48 units qhs  Continue metformin 1000 mg twice daily  Continue checking blood glucose daily and did offer a CGM which he will consider as his daughter is attempting to learn how to place it.  RTC in 2-3 months for a follow up visit with lab work for A1c and BMP to be done prior to that visit    Orders:    Basic metabolic panel; Future    Hemoglobin A1C; Future    Tirzepatide (Mounjaro) 5 MG/0.5ML SOAJ; Inject 5 mg under the skin once a week    Hypertension goal BP (blood pressure) < 140/90  Continue losartan 25 mg daily     Mixed hyperlipidemia  Continue atorvastatin 20 mg daily         History of Present Illness     José Miguel Costa is a 64 y.o. male with type 2 diabetes with long-term insulin use seen in follow up. Reports complications of microalbuminuria.     A1c: 11.4% (3/18/25)   Current regimen: Lantus 48 units at bedtiem, metformin 1000 mg twice daily, Trulicity 0.75 mg weekly (restarted 1 month ago). Denies any issues with his current regimen.    Denies recent illness, hospitalization or steroid use. He denies missing his insulin doses. He would take his sister-in-law's NovoLog 10-12 units 4 times over the past 3 months.     He has been noting blood glucose as high as 500s at home. He had hypoglycemic symptoms twice around a blood glucose range of  which he treats with a can of soda or a candy bar.      Home blood glucometer readings (past 1 week):  Before breakfast: 90s-170s  Before lunch: 100s-170s  Before dinner: 111-209  Bedtime: 170s-300s    Dinner is his biggest meal of the day.    Last Eye Exam: 02/28/2025  Last Foot Exam: 09/03/2024  Statin: Atorvatatin 20 mg daily  ACE-I/ARB: Losartan 25 mg daily     Review of Systems as per [facility]    Health Maintenance   Topic Date Due    Diabetic Foot Exam  09/03/2025    Diabetic Eye Exam  02/28/2027     Pertinent Medical History   Past Medical History:   Diagnosis Date    Diabetes mellitus (HCC)        Current Outpatient Medications on File Prior to Visit   Medication Sig Dispense Refill    atorvastatin (LIPITOR) 20 mg tablet Take 1 tablet (20 mg total) by mouth daily 90 tablet 3    Dulaglutide (Trulicity) 0.75 MG/0.5ML SOAJ Inject 0.75 mg under the skin once a week 2 mL 3    Insulin Glargine Solostar (Lantus SoloStar) 100 UNIT/ML SOPN E11.65 inject 40 units daily. Dispense for TDD 50 units (Patient taking differently: 48 Units E11.65 inject 40 units daily. Dispense for TDD 50 units) 45 mL 1    Insulin Pen Needle (B-D UF III MINI PEN NEEDLES) 31G X 5 MM MISC Use 4 (four) times a day 100 each 3    losartan (COZAAR) 25 mg tablet Take 1 tablet (25 mg total) by mouth daily 30 tablet 5    metFORMIN (GLUCOPHAGE) 1000 MG tablet Take 1 tablet (1,000 mg total) by mouth 2 (two) times a day with meals 180 tablet 3    Continuous Blood Gluc Sensor (FreeStyle Vicente 2 Sensor) MISC Scan blood sugars before meals and bedtime (Patient not taking: Reported on 5/10/2023) 2 each 3    CONTOUR NEXT TEST test strip USE DAILY AS DIRECTED (Patient not taking: Reported on 4/11/2025) 50 each 3    Easy Touch Pen Needles 31G X 8 MM MISC USE AS DIRECTED WITH BASAGLAR PEN (Patient not taking: No sig reported) 30 each 0     No current facility-administered medications on file prior to visit.      Social History     Tobacco Use    Smoking status: Never    Smokeless tobacco: Never   Vaping Use    Vaping  "status: Never Used   Substance and Sexual Activity    Alcohol use: Yes     Comment: ocassional    Drug use: Never    Sexual activity: Not Currently      Medical History Reviewed by provider this encounter.    Objective   /74 (Patient Position: Sitting, Cuff Size: Standard)   Pulse 71   Temp (!) 96.4 °F (35.8 °C) (Temporal)   Ht 6' 3\" (1.905 m)   Wt 125 kg (274 lb 12.8 oz)   BMI 34.35 kg/m²      Body mass index is 34.35 kg/m².  Wt Readings from Last 3 Encounters:   04/11/25 125 kg (274 lb 12.8 oz)   02/28/25 122 kg (269 lb 3.2 oz)   01/09/25 124 kg (273 lb 3.2 oz)     Physical Exam  Vitals and nursing note reviewed.   Constitutional:       General: He is not in acute distress.     Appearance: He is well-developed.   HENT:      Head: Normocephalic and atraumatic.   Eyes:      Conjunctiva/sclera: Conjunctivae normal.   Cardiovascular:      Rate and Rhythm: Normal rate and regular rhythm.      Heart sounds: No murmur heard.  Pulmonary:      Effort: Pulmonary effort is normal. No respiratory distress.      Breath sounds: Normal breath sounds.   Abdominal:      Palpations: Abdomen is soft.      Tenderness: There is no abdominal tenderness.   Musculoskeletal:         General: No swelling.      Cervical back: Neck supple.   Skin:     General: Skin is warm and dry.      Capillary Refill: Capillary refill takes less than 2 seconds.   Neurological:      Mental Status: He is alert.   Psychiatric:         Mood and Affect: Mood normal.       Labs:   Lab Results   Component Value Date    HGBA1C 11.4 (H) 03/18/2025    HGBA1C 6.8 (H) 07/18/2024    HGBA1C 6.6 (H) 01/23/2024     Lab Results   Component Value Date    CREATININE 0.91 03/18/2025    CREATININE 0.92 07/18/2024    CREATININE 1.08 07/01/2024    BUN 15 03/18/2025    K 4.6 03/18/2025     03/18/2025    CO2 24 03/18/2025     eGFR   Date Value Ref Range Status   03/18/2025 89 ml/min/1.73sq m Final     Lab Results   Component Value Date    HDL 44 03/18/2025    " TRIG 133 03/18/2025     Lab Results   Component Value Date    ALT 18 03/18/2025    AST 15 03/18/2025    ALKPHOS 65 03/18/2025     Lab Results   Component Value Date    HPB1XIHWNFXU 3.720 05/15/2019    IOL3EFAVSCIM 3.537 03/15/2018     Discussed with the patient and all questioned fully answered. He will call me if any problems arise.

## 2025-04-11 NOTE — ASSESSMENT & PLAN NOTE
Lab Results   Component Value Date    HGBA1C 11.4 (H) 03/18/2025     The cause of his hyperglycemia could be poor dietary patterns including late eating  Will switch Trulicity to Mounjaro 5 mg weekly. Counseled on side effects.   Continue Lantus 48 units qhs  Continue metformin 1000 mg twice daily  Continue checking blood glucose daily and did offer a CGM which he will consider as his daughter is attempting to learn how to place it.  RTC in 2-3 months for a follow up visit with lab work for A1c and BMP to be done prior to that visit    Orders:    Basic metabolic panel; Future    Hemoglobin A1C; Future    Tirzepatide (Mounjaro) 5 MG/0.5ML SOAJ; Inject 5 mg under the skin once a week

## 2025-04-15 ENCOUNTER — TELEPHONE (OUTPATIENT)
Age: 65
End: 2025-04-15

## 2025-04-15 NOTE — TELEPHONE ENCOUNTER
PA for Mounjaro 5mg SUBMITTED to [employer]    via    []CMM-KEY:   []Surescripts-Case ID #   []Availity-Auth ID # NDC #   []Faxed to plan   [x]Other website PromptPA EOC ID [id number]  []Phone call Case ID #     [x]PA sent as URGENT    All office notes, labs and other pertaining documents and studies sent. Clinical questions answered. Awaiting determination from insurance company.     Turnaround time for your insurance to make a decision on your Prior Authorization can take 7-21 business days.

## 2025-04-15 NOTE — TELEPHONE ENCOUNTER
Authorization has been approved via call from [employer] @ [facility]  Auth # [id number]  Valid 4/15/25-4/15/26

## 2025-05-12 ENCOUNTER — VBI (OUTPATIENT)
Dept: ADMINISTRATIVE | Facility: OTHER | Age: 65
End: 2025-05-12

## 2025-05-12 NOTE — TELEPHONE ENCOUNTER
05/12/25 9:20 AM     Chart reviewed for Diabetic Eye Exam was/were submitted to the patient's insurance.     Dahlia Pollock MA   [facility]

## 2025-05-28 ENCOUNTER — DOCUMENTATION (OUTPATIENT)
Dept: ADMINISTRATIVE | Facility: OTHER | Age: 65
End: 2025-05-28

## 2025-05-28 NOTE — PROGRESS NOTES
05/28/25 12:19 PM     DM A1c outreach is not required, SEEN WITHIN 3 MONTHS WITH ENDO    Thank you.  Tahira Marse MA  [facility]

## 2025-06-25 ENCOUNTER — APPOINTMENT (OUTPATIENT)
Dept: LAB | Facility: MEDICAL CENTER | Age: 65
End: 2025-06-25
Attending: STUDENT IN AN ORGANIZED HEALTH CARE EDUCATION/TRAINING PROGRAM
Payer: COMMERCIAL

## 2025-06-25 DIAGNOSIS — E11.65 TYPE 2 DIABETES MELLITUS WITH HYPERGLYCEMIA, WITH LONG-TERM CURRENT USE OF INSULIN (HCC): ICD-10-CM

## 2025-06-25 DIAGNOSIS — Z79.4 TYPE 2 DIABETES MELLITUS WITH HYPERGLYCEMIA, WITH LONG-TERM CURRENT USE OF INSULIN (HCC): ICD-10-CM

## 2025-06-25 LAB
ANION GAP SERPL CALCULATED.3IONS-SCNC: 7 MMOL/L (ref 4–13)
BUN SERPL-MCNC: 15 MG/DL (ref 5–25)
CALCIUM SERPL-MCNC: 8.9 MG/DL (ref 8.4–10.2)
CHLORIDE SERPL-SCNC: 107 MMOL/L (ref 96–108)
CO2 SERPL-SCNC: 28 MMOL/L (ref 21–32)
CREAT SERPL-MCNC: 0.91 MG/DL (ref 0.6–1.3)
EST. AVERAGE GLUCOSE BLD GHB EST-MCNC: 174 MG/DL
GFR SERPL CREATININE-BSD FRML MDRD: 88 ML/MIN/1.73SQ M
GLUCOSE P FAST SERPL-MCNC: 126 MG/DL (ref 65–99)
HBA1C MFR BLD: 7.7 %
POTASSIUM SERPL-SCNC: 4.5 MMOL/L (ref 3.5–5.3)
SODIUM SERPL-SCNC: 142 MMOL/L (ref 135–147)

## 2025-06-25 PROCEDURE — 36415 COLL VENOUS BLD VENIPUNCTURE: CPT

## 2025-06-25 PROCEDURE — 83036 HEMOGLOBIN GLYCOSYLATED A1C: CPT

## 2025-06-25 PROCEDURE — 80048 BASIC METABOLIC PNL TOTAL CA: CPT

## 2025-06-30 ENCOUNTER — OFFICE VISIT (OUTPATIENT)
Dept: ENDOCRINOLOGY | Facility: CLINIC | Age: 65
End: 2025-06-30
Payer: COMMERCIAL

## 2025-06-30 VITALS
HEART RATE: 76 BPM | HEIGHT: 75 IN | BODY MASS INDEX: 34.69 KG/M2 | TEMPERATURE: 97.3 F | WEIGHT: 279 LBS | DIASTOLIC BLOOD PRESSURE: 78 MMHG | SYSTOLIC BLOOD PRESSURE: 140 MMHG

## 2025-06-30 DIAGNOSIS — I10 HYPERTENSION GOAL BP (BLOOD PRESSURE) < 140/90: ICD-10-CM

## 2025-06-30 DIAGNOSIS — E11.65 TYPE 2 DIABETES MELLITUS WITH HYPERGLYCEMIA, WITH LONG-TERM CURRENT USE OF INSULIN (HCC): Primary | ICD-10-CM

## 2025-06-30 DIAGNOSIS — Z79.4 TYPE 2 DIABETES MELLITUS WITH HYPERGLYCEMIA, WITH LONG-TERM CURRENT USE OF INSULIN (HCC): Primary | ICD-10-CM

## 2025-06-30 DIAGNOSIS — E78.2 MIXED HYPERLIPIDEMIA: ICD-10-CM

## 2025-06-30 PROCEDURE — 99214 OFFICE O/P EST MOD 30 MIN: CPT | Performed by: STUDENT IN AN ORGANIZED HEALTH CARE EDUCATION/TRAINING PROGRAM

## 2025-06-30 RX ORDER — TIRZEPATIDE 5 MG/.5ML
5 INJECTION, SOLUTION SUBCUTANEOUS WEEKLY
Qty: 2 ML | Refills: 3 | Status: SHIPPED | OUTPATIENT
Start: 2025-06-30

## 2025-06-30 RX ORDER — INSULIN GLARGINE 100 [IU]/ML
INJECTION, SOLUTION SUBCUTANEOUS
Qty: 45 ML | Refills: 1 | Status: SHIPPED | OUTPATIENT
Start: 2025-06-30 | End: 2025-07-07

## 2025-06-30 NOTE — ASSESSMENT & PLAN NOTE
Type 2 diabetes mellitus: Not at treatment goal.  Fasting blood glucose levels are within acceptable range (eg <130), but there is a concern regarding postprandial hyperglycemia. A1c has shown a significant improvement, decreasing by 4 points. Current A1c level may not accurately reflect present glycemic control. Discussed adjusting Lantus dosage for maintaining FBG <110 - 130 with avoidance of hypoglycemia. Continue metformin. Reviewed the benefits of Mounjaro for postprandial blood sugar control. Prescription for Mounjaro 5 mg was provided.    Follow-up: The patient will follow up in 4 months.      Lab Results   Component Value Date    HGBA1C 7.7 (H) 06/25/2025       Orders:  •  Hemoglobin A1C; Future  •  Basic metabolic panel; Future  •  Lipid Panel with Direct LDL reflex; Future  •  Insulin Glargine Solostar (Lantus SoloStar) 100 UNIT/ML SOPN; E11.65 inject 30 units daily. Dispense for TDD 50 units  •  Tirzepatide (Mounjaro) 5 MG/0.5ML SOAJ; Inject 5 mg under the skin once a week

## 2025-06-30 NOTE — PROGRESS NOTES
Name: José Miguel Costa      : 1960      MRN: [id number]  Encounter Provider: Andry Devine DO  Encounter Date: 2025   Encounter department: [facility]    No chief complaint on file.  :  Assessment & Plan  Type 2 diabetes mellitus with hyperglycemia, with long-term current use of insulin (HCC)  Type 2 diabetes mellitus: Not at treatment goal.  Fasting blood glucose levels are within acceptable range (eg <130), but there is a concern regarding postprandial hyperglycemia. A1c has shown a significant improvement, decreasing by 4 points. Current A1c level may not accurately reflect present glycemic control. Discussed adjusting Lantus dosage for maintaining FBG <110 - 130 with avoidance of hypoglycemia. Continue metformin. Reviewed the benefits of Mounjaro for postprandial blood sugar control. Prescription for Mounjaro 5 mg was provided.    Follow-up: The patient will follow up in 4 months.      Lab Results   Component Value Date    HGBA1C 7.7 (H) 2025       Orders:  •  Hemoglobin A1C; Future  •  Basic metabolic panel; Future  •  Lipid Panel with Direct LDL reflex; Future  •  Insulin Glargine Solostar (Lantus SoloStar) 100 UNIT/ML SOPN; E11.65 inject 30 units daily. Dispense for TDD 50 units  •  Tirzepatide (Mounjaro) 5 MG/0.5ML SOAJ; Inject 5 mg under the skin once a week    Mixed hyperlipidemia  Continue statin       Hypertension goal BP (blood pressure) < 140/90  Fair control             Pertinent Medical History         History of Present Illness   History of Present Illness  The patient is a 65-year-old male here today for a routine follow-up of type 2 diabetes. His last visit was on 2025, during which his uncontrolled diabetes with an A1c of 11.4% (recorded on 2025) was addressed. He has been focusing on improving his blood sugar control and was recommended to start Mounjaro during the last visit.    He reports that his blood glucose  "levels have been stable, typically ranging from 125 to 130 upon waking. However, he experiences hypoglycemia around lunchtime or dinnertime, which he attributes to irregular eating habits. He also notes that his evening blood glucose levels are the highest. He believes that his Lantus dosage may be excessive, as his current blood glucose level is approximately 140. He has observed a pattern of elevated blood glucose levels every two years, with readings previously reaching the 400s and 500s. He adjusts his Lantus dosage based on his blood glucose levels, reducing it when levels drop to the 70s or 80s. His Lantus dosage has varied between 26 and 40 units in the past.    He was prescribed a monitor over a year ago, but it  before he could use it. He was also prescribed Mounjaro, but due to financial constraints, he was unable to obtain it. He has since switched to [facility] for his medications, which provides a 90-day supply.    He reports no recent kidney issues or kidney stones.    Review of Systems as per HPI         Medical History Reviewed by provider this encounter:  Tobacco  Allergies  Meds  Problems  Med Hx  Surg Hx  Fam Hx     .    Objective   /78 (Patient Position: Sitting, Cuff Size: Standard)   Pulse 76   Temp (!) 97.3 °F (36.3 °C) (Temporal)   Ht 6' 3\" (1.905 m)   Wt 127 kg (279 lb)   BMI 34.87 kg/m²      Body mass index is 34.87 kg/m².  Wt Readings from Last 3 Encounters:   25 127 kg (279 lb)   25 125 kg (274 lb 12.8 oz)   25 122 kg (269 lb 3.2 oz)     Physical Exam  Vitals reviewed.   Constitutional:       General: He is not in acute distress.     Appearance: Normal appearance.   HENT:      Head: Normocephalic and atraumatic.      Nose: Nose normal.     Eyes:      General: No scleral icterus.     Conjunctiva/sclera: Conjunctivae normal.     Pulmonary:      Effort: Pulmonary effort is normal. No respiratory distress.     Musculoskeletal:         General: " No deformity.      Cervical back: Normal range of motion.     Skin:     General: Skin is warm and dry.     Neurological:      Mental Status: He is alert.     Psychiatric:         Mood and Affect: Mood normal.         Behavior: Behavior normal.         Last Eye Exam: 02/28/2025  Last Foot Exam: 09/03/2024  Health Maintenance   Topic Date Due   • Diabetic Foot Exam  09/03/2025   • Diabetic Eye Exam  02/28/2027       Labs: I have reviewed pertinent labs including:     Component      Latest Ref Rng 6/25/2025   Sodium      135 - 147 mmol/L 142    Potassium      3.5 - 5.3 mmol/L 4.5    Chloride      96 - 108 mmol/L 107    Carbon Dioxide      21 - 32 mmol/L 28    ANION GAP      4 - 13 mmol/L 7    BUN      5 - 25 mg/dL 15    Creatinine      0.60 - 1.30 mg/dL 0.91    GLUCOSE, FASTING      65 - 99 mg/dL 126 (H)    Calcium      8.4 - 10.2 mg/dL 8.9    GFR, Calculated      ml/min/1.73sq m 88    Hemoglobin A1C      Normal 4.0-5.6%; PreDiabetic 5.7-6.4%; Diabetic >=6.5%; Glycemic control for adults with diabetes <7.0% % 7.7 (H)    eAG, EST AVG Glucose      mg/dl 174       Legend:  (H) High    There are no Patient Instructions on file for this visit.    Discussed with the patient and all questioned fully answered. He will call me if any problems arise.

## 2025-07-07 DIAGNOSIS — E11.29 TYPE 2 DIABETES MELLITUS WITH MICROALBUMINURIA, WITH LONG-TERM CURRENT USE OF INSULIN (HCC): ICD-10-CM

## 2025-07-07 DIAGNOSIS — R80.9 TYPE 2 DIABETES MELLITUS WITH MICROALBUMINURIA, WITH LONG-TERM CURRENT USE OF INSULIN (HCC): ICD-10-CM

## 2025-07-07 DIAGNOSIS — Z79.4 TYPE 2 DIABETES MELLITUS WITH HYPERGLYCEMIA, WITH LONG-TERM CURRENT USE OF INSULIN (HCC): ICD-10-CM

## 2025-07-07 DIAGNOSIS — E11.65 TYPE 2 DIABETES MELLITUS WITH HYPERGLYCEMIA, WITH LONG-TERM CURRENT USE OF INSULIN (HCC): ICD-10-CM

## 2025-07-07 DIAGNOSIS — Z79.4 TYPE 2 DIABETES MELLITUS WITH MICROALBUMINURIA, WITH LONG-TERM CURRENT USE OF INSULIN (HCC): ICD-10-CM

## 2025-07-07 RX ORDER — LOSARTAN POTASSIUM 25 MG/1
25 TABLET ORAL DAILY
Qty: 30 TABLET | Refills: 5 | Status: SHIPPED | OUTPATIENT
Start: 2025-07-07 | End: 2025-07-16 | Stop reason: SDUPTHER

## 2025-07-07 RX ORDER — INSULIN GLARGINE 100 [IU]/ML
INJECTION, SOLUTION SUBCUTANEOUS
Qty: 45 ML | Refills: 1 | Status: SHIPPED | OUTPATIENT
Start: 2025-07-07

## 2025-07-16 RX ORDER — LOSARTAN POTASSIUM 25 MG/1
25 TABLET ORAL DAILY
Qty: 90 TABLET | Refills: 1 | Status: SHIPPED | OUTPATIENT
Start: 2025-07-16

## 2025-07-22 DIAGNOSIS — Z79.4 TYPE 2 DIABETES MELLITUS WITH HYPERGLYCEMIA, WITH LONG-TERM CURRENT USE OF INSULIN (HCC): ICD-10-CM

## 2025-07-22 DIAGNOSIS — E11.65 TYPE 2 DIABETES MELLITUS WITH HYPERGLYCEMIA, WITH LONG-TERM CURRENT USE OF INSULIN (HCC): ICD-10-CM

## 2025-07-22 RX ORDER — TIRZEPATIDE 5 MG/.5ML
5 INJECTION, SOLUTION SUBCUTANEOUS WEEKLY
Qty: 2 ML | Refills: 3 | Status: SHIPPED | OUTPATIENT
Start: 2025-07-22

## 2025-07-23 RX ORDER — TIRZEPATIDE 5 MG/.5ML
5 INJECTION, SOLUTION SUBCUTANEOUS WEEKLY
Qty: 2 ML | Refills: 3 | Status: SHIPPED | OUTPATIENT
Start: 2025-07-23

## 2025-08-04 ENCOUNTER — VBI (OUTPATIENT)
Dept: ADMINISTRATIVE | Facility: OTHER | Age: 65
End: 2025-08-04

## 2025-08-08 ENCOUNTER — TELEPHONE (OUTPATIENT)
Dept: FAMILY MEDICINE CLINIC | Facility: CLINIC | Age: 65
End: 2025-08-08